# Patient Record
Sex: FEMALE | Race: WHITE | URBAN - METROPOLITAN AREA
[De-identification: names, ages, dates, MRNs, and addresses within clinical notes are randomized per-mention and may not be internally consistent; named-entity substitution may affect disease eponyms.]

---

## 2017-11-14 ENCOUNTER — INPATIENT (INPATIENT)
Facility: HOSPITAL | Age: 72
LOS: 0 days | Discharge: HOME HEALTH | End: 2017-11-15
Attending: INTERNAL MEDICINE | Admitting: FAMILY MEDICINE
Payer: COMMERCIAL

## 2017-11-14 VITALS
OXYGEN SATURATION: 96 % | TEMPERATURE: 98 F | WEIGHT: 190.04 LBS | SYSTOLIC BLOOD PRESSURE: 140 MMHG | HEIGHT: 65 IN | DIASTOLIC BLOOD PRESSURE: 55 MMHG | HEART RATE: 82 BPM | RESPIRATION RATE: 18 BRPM

## 2017-11-14 LAB
ALBUMIN SERPL ELPH-MCNC: 3.3 G/DL — SIGNIFICANT CHANGE UP (ref 3.3–5)
ALP SERPL-CCNC: 75 U/L — SIGNIFICANT CHANGE UP (ref 40–120)
ALT FLD-CCNC: 17 U/L — SIGNIFICANT CHANGE UP (ref 12–78)
ANION GAP SERPL CALC-SCNC: 8 MMOL/L — SIGNIFICANT CHANGE UP (ref 5–17)
APTT BLD: 32.6 SEC — SIGNIFICANT CHANGE UP (ref 27.5–37.4)
AST SERPL-CCNC: 17 U/L — SIGNIFICANT CHANGE UP (ref 15–37)
BASOPHILS # BLD AUTO: 0.1 K/UL — SIGNIFICANT CHANGE UP (ref 0–0.2)
BASOPHILS NFR BLD AUTO: 1.1 % — SIGNIFICANT CHANGE UP (ref 0–2)
BILIRUB SERPL-MCNC: 0.3 MG/DL — SIGNIFICANT CHANGE UP (ref 0.2–1.2)
BUN SERPL-MCNC: 26 MG/DL — HIGH (ref 7–23)
CALCIUM SERPL-MCNC: 9.1 MG/DL — SIGNIFICANT CHANGE UP (ref 8.5–10.1)
CHLORIDE SERPL-SCNC: 102 MMOL/L — SIGNIFICANT CHANGE UP (ref 96–108)
CO2 SERPL-SCNC: 26 MMOL/L — SIGNIFICANT CHANGE UP (ref 22–31)
CREAT SERPL-MCNC: 1.1 MG/DL — SIGNIFICANT CHANGE UP (ref 0.5–1.3)
EOSINOPHIL # BLD AUTO: 0.4 K/UL — SIGNIFICANT CHANGE UP (ref 0–0.5)
EOSINOPHIL NFR BLD AUTO: 4.6 % — SIGNIFICANT CHANGE UP (ref 0–6)
GLUCOSE SERPL-MCNC: 262 MG/DL — HIGH (ref 70–99)
HCT VFR BLD CALC: 45.9 % — HIGH (ref 34.5–45)
HGB BLD-MCNC: 15 G/DL — SIGNIFICANT CHANGE UP (ref 11.5–15.5)
INR BLD: 0.97 RATIO — SIGNIFICANT CHANGE UP (ref 0.88–1.16)
LYMPHOCYTES # BLD AUTO: 2.6 K/UL — SIGNIFICANT CHANGE UP (ref 1–3.3)
LYMPHOCYTES # BLD AUTO: 28.5 % — SIGNIFICANT CHANGE UP (ref 13–44)
MAGNESIUM SERPL-MCNC: 2 MG/DL — SIGNIFICANT CHANGE UP (ref 1.6–2.6)
MCHC RBC-ENTMCNC: 30.4 PG — SIGNIFICANT CHANGE UP (ref 27–34)
MCHC RBC-ENTMCNC: 32.8 GM/DL — SIGNIFICANT CHANGE UP (ref 32–36)
MCV RBC AUTO: 92.7 FL — SIGNIFICANT CHANGE UP (ref 80–100)
MONOCYTES # BLD AUTO: 0.7 K/UL — SIGNIFICANT CHANGE UP (ref 0–0.9)
MONOCYTES NFR BLD AUTO: 7.2 % — SIGNIFICANT CHANGE UP (ref 2–14)
NEUTROPHILS # BLD AUTO: 5.4 K/UL — SIGNIFICANT CHANGE UP (ref 1.8–7.4)
NEUTROPHILS NFR BLD AUTO: 58.6 % — SIGNIFICANT CHANGE UP (ref 43–77)
NT-PROBNP SERPL-SCNC: 395 PG/ML — HIGH (ref 0–125)
PLATELET # BLD AUTO: 270 K/UL — SIGNIFICANT CHANGE UP (ref 150–400)
POTASSIUM SERPL-MCNC: 4.1 MMOL/L — SIGNIFICANT CHANGE UP (ref 3.5–5.3)
POTASSIUM SERPL-SCNC: 4.1 MMOL/L — SIGNIFICANT CHANGE UP (ref 3.5–5.3)
PROT SERPL-MCNC: 7.5 GM/DL — SIGNIFICANT CHANGE UP (ref 6–8.3)
PROTHROM AB SERPL-ACNC: 10.5 SEC — SIGNIFICANT CHANGE UP (ref 9.8–12.7)
RBC # BLD: 4.95 M/UL — SIGNIFICANT CHANGE UP (ref 3.8–5.2)
RBC # FLD: 12.4 % — SIGNIFICANT CHANGE UP (ref 10.3–14.5)
SODIUM SERPL-SCNC: 136 MMOL/L — SIGNIFICANT CHANGE UP (ref 135–145)
TROPONIN I SERPL-MCNC: <0.015 NG/ML — SIGNIFICANT CHANGE UP (ref 0.01–0.04)
WBC # BLD: 9.1 K/UL — SIGNIFICANT CHANGE UP (ref 3.8–10.5)
WBC # FLD AUTO: 9.1 K/UL — SIGNIFICANT CHANGE UP (ref 3.8–10.5)

## 2017-11-14 PROCEDURE — 71010: CPT | Mod: 26

## 2017-11-14 PROCEDURE — 99285 EMERGENCY DEPT VISIT HI MDM: CPT

## 2017-11-14 PROCEDURE — 93010 ELECTROCARDIOGRAM REPORT: CPT

## 2017-11-14 PROCEDURE — 70450 CT HEAD/BRAIN W/O DYE: CPT | Mod: 26

## 2017-11-14 RX ORDER — ASPIRIN/CALCIUM CARB/MAGNESIUM 324 MG
1 TABLET ORAL
Qty: 0 | Refills: 0 | COMMUNITY

## 2017-11-14 RX ORDER — ERGOCALCIFEROL 1.25 MG/1
0 CAPSULE ORAL
Qty: 0 | Refills: 0 | COMMUNITY

## 2017-11-14 NOTE — ED PROVIDER NOTE - MEDICAL DECISION MAKING DETAILS
patient with hx of aortic stenosis and worsening dizziness on exertion. case discussed with cardiologist Dr Parada, he will evaluate patient in the morning.

## 2017-11-14 NOTE — ED PROVIDER NOTE - OBJECTIVE STATEMENT
72 year old female pt, with hx of DM, presents to the ED s/p fall. Pt states she was in kitchen about to clean up, when she fell light headed and dizzy. Started to fall back, hitting head into cabinets and falling onto kitchen floor. Denies LOC. Pt was unable to pick herself up off the ground so daughter called paramedics. Currently, no cp or sob, and was not diaphoretic on floor. Has been feeling increasingly dizzy over the last several weeks. PMD is Dr. briones no scal hematoma f5rom at the hips 72 year old female pt, with hx of DM, presents to the ED s/p fall. Pt states she was in kitchen about to clean up, when she fell light headed and dizzy. Started to fall back, hitting head into cabinets and falling onto kitchen floor. Denies LOC. Pt was unable to pick herself up off the ground so daughter called paramedics. Currently, no cp or sob. Was not diaphoretic on floor. Pt states she has been feeling increasingly dizzy over the last several weeks. PMD is Dr. Silva no scal hematoma f5rom at the hips 72 year old female pt, with hx of DM, presents to the ED s/p fall. Pt states she was in kitchen about to clean up, when she fell light headed and dizzy. Started to fall back, hitting head into cabinets and falling onto kitchen floor. Denies LOC. Pt was unable to pick herself up off the ground so daughter called paramedics. Currently, no cp or sob. Was not diaphoretic on floor. Pt states she has been feeling increasingly dizzy over the last several weeks. PMD is Dr. Silva.

## 2017-11-14 NOTE — ED PROVIDER NOTE - MUSCULOSKELETAL, MLM
Spine appears normal, range of motion is not limited, no muscle or joint tenderness. FROM at the hips.

## 2017-11-14 NOTE — ED ADULT NURSE NOTE - OBJECTIVE STATEMENT
Trauma alert. Patient reports feeling dizzy and fell back hitting head with cabinets. Patient on ASA. Denies LOC, no active bleeding. Daughter at bedside reports patient could not get up so she called the ambulance. Patient reports she has been feeling "dizzy for a while", but has not seen PCP about it. Hx of DM and breast CA.

## 2017-11-14 NOTE — ED ADULT NURSE REASSESSMENT NOTE - NS ED NURSE REASSESS COMMENT FT1
Per  at bedside patient had a cardiac stent placed in about 1 year ago by Dr. Parada. Patient on , not 81 mg as stated earlier. Patient to be admitted due to dizziness. Will continue monitoring patient.

## 2017-11-15 ENCOUNTER — TRANSCRIPTION ENCOUNTER (OUTPATIENT)
Age: 72
End: 2017-11-15

## 2017-11-15 VITALS
SYSTOLIC BLOOD PRESSURE: 122 MMHG | DIASTOLIC BLOOD PRESSURE: 52 MMHG | OXYGEN SATURATION: 100 % | TEMPERATURE: 98 F | RESPIRATION RATE: 17 BRPM | HEART RATE: 71 BPM

## 2017-11-15 DIAGNOSIS — T82.898A OTHER SPECIFIED COMPLICATION OF VASCULAR PROSTHETIC DEVICES, IMPLANTS AND GRAFTS, INITIAL ENCOUNTER: Chronic | ICD-10-CM

## 2017-11-15 LAB
ALBUMIN SERPL ELPH-MCNC: 3.1 G/DL — LOW (ref 3.3–5)
ALP SERPL-CCNC: 66 U/L — SIGNIFICANT CHANGE UP (ref 40–120)
ALT FLD-CCNC: 14 U/L — SIGNIFICANT CHANGE UP (ref 12–78)
ANION GAP SERPL CALC-SCNC: 6 MMOL/L — SIGNIFICANT CHANGE UP (ref 5–17)
AST SERPL-CCNC: 10 U/L — LOW (ref 15–37)
BASOPHILS # BLD AUTO: 0.1 K/UL — SIGNIFICANT CHANGE UP (ref 0–0.2)
BASOPHILS NFR BLD AUTO: 1.2 % — SIGNIFICANT CHANGE UP (ref 0–2)
BILIRUB SERPL-MCNC: 0.4 MG/DL — SIGNIFICANT CHANGE UP (ref 0.2–1.2)
BUN SERPL-MCNC: 23 MG/DL — SIGNIFICANT CHANGE UP (ref 7–23)
CALCIUM SERPL-MCNC: 9.1 MG/DL — SIGNIFICANT CHANGE UP (ref 8.5–10.1)
CHLORIDE SERPL-SCNC: 105 MMOL/L — SIGNIFICANT CHANGE UP (ref 96–108)
CHOLEST SERPL-MCNC: 192 MG/DL — SIGNIFICANT CHANGE UP (ref 10–199)
CK SERPL-CCNC: 52 U/L — SIGNIFICANT CHANGE UP (ref 26–192)
CO2 SERPL-SCNC: 28 MMOL/L — SIGNIFICANT CHANGE UP (ref 22–31)
CREAT SERPL-MCNC: 0.92 MG/DL — SIGNIFICANT CHANGE UP (ref 0.5–1.3)
EOSINOPHIL # BLD AUTO: 0.4 K/UL — SIGNIFICANT CHANGE UP (ref 0–0.5)
EOSINOPHIL NFR BLD AUTO: 4.4 % — SIGNIFICANT CHANGE UP (ref 0–6)
GLUCOSE BLDC GLUCOMTR-MCNC: 189 MG/DL — HIGH (ref 70–99)
GLUCOSE BLDC GLUCOMTR-MCNC: 239 MG/DL — HIGH (ref 70–99)
GLUCOSE SERPL-MCNC: 298 MG/DL — HIGH (ref 70–99)
HBA1C BLD-MCNC: 9.7 % — HIGH (ref 4–5.6)
HCT VFR BLD CALC: 44.1 % — SIGNIFICANT CHANGE UP (ref 34.5–45)
HDLC SERPL-MCNC: 46 MG/DL — SIGNIFICANT CHANGE UP (ref 40–125)
HGB BLD-MCNC: 14 G/DL — SIGNIFICANT CHANGE UP (ref 11.5–15.5)
LIPID PNL WITH DIRECT LDL SERPL: 117 MG/DL — SIGNIFICANT CHANGE UP
LYMPHOCYTES # BLD AUTO: 3.2 K/UL — SIGNIFICANT CHANGE UP (ref 1–3.3)
LYMPHOCYTES # BLD AUTO: 34.5 % — SIGNIFICANT CHANGE UP (ref 13–44)
MAGNESIUM SERPL-MCNC: 2 MG/DL — SIGNIFICANT CHANGE UP (ref 1.6–2.6)
MCHC RBC-ENTMCNC: 29.9 PG — SIGNIFICANT CHANGE UP (ref 27–34)
MCHC RBC-ENTMCNC: 31.8 GM/DL — LOW (ref 32–36)
MCV RBC AUTO: 94 FL — SIGNIFICANT CHANGE UP (ref 80–100)
MONOCYTES # BLD AUTO: 0.7 K/UL — SIGNIFICANT CHANGE UP (ref 0–0.9)
MONOCYTES NFR BLD AUTO: 7.4 % — SIGNIFICANT CHANGE UP (ref 2–14)
NEUTROPHILS # BLD AUTO: 4.8 K/UL — SIGNIFICANT CHANGE UP (ref 1.8–7.4)
NEUTROPHILS NFR BLD AUTO: 52.5 % — SIGNIFICANT CHANGE UP (ref 43–77)
PHOSPHATE SERPL-MCNC: 2.5 MG/DL — SIGNIFICANT CHANGE UP (ref 2.5–4.5)
PLATELET # BLD AUTO: 237 K/UL — SIGNIFICANT CHANGE UP (ref 150–400)
POTASSIUM SERPL-MCNC: 4.5 MMOL/L — SIGNIFICANT CHANGE UP (ref 3.5–5.3)
POTASSIUM SERPL-SCNC: 4.5 MMOL/L — SIGNIFICANT CHANGE UP (ref 3.5–5.3)
PROT SERPL-MCNC: 6.8 GM/DL — SIGNIFICANT CHANGE UP (ref 6–8.3)
RBC # BLD: 4.7 M/UL — SIGNIFICANT CHANGE UP (ref 3.8–5.2)
RBC # FLD: 12.3 % — SIGNIFICANT CHANGE UP (ref 10.3–14.5)
SODIUM SERPL-SCNC: 139 MMOL/L — SIGNIFICANT CHANGE UP (ref 135–145)
TOTAL CHOLESTEROL/HDL RATIO MEASUREMENT: 4.2 RATIO — SIGNIFICANT CHANGE UP (ref 3.3–7.1)
TRIGL SERPL-MCNC: 146 MG/DL — SIGNIFICANT CHANGE UP (ref 10–149)
TROPONIN I SERPL-MCNC: <0.015 NG/ML — SIGNIFICANT CHANGE UP (ref 0.01–0.04)
TROPONIN I SERPL-MCNC: <0.015 NG/ML — SIGNIFICANT CHANGE UP (ref 0.01–0.04)
WBC # BLD: 9.2 K/UL — SIGNIFICANT CHANGE UP (ref 3.8–10.5)
WBC # FLD AUTO: 9.2 K/UL — SIGNIFICANT CHANGE UP (ref 3.8–10.5)

## 2017-11-15 PROCEDURE — 93010 ELECTROCARDIOGRAM REPORT: CPT

## 2017-11-15 RX ORDER — INSULIN LISPRO 100/ML
VIAL (ML) SUBCUTANEOUS AT BEDTIME
Qty: 0 | Refills: 0 | Status: DISCONTINUED | OUTPATIENT
Start: 2017-11-15 | End: 2017-11-15

## 2017-11-15 RX ORDER — SODIUM CHLORIDE 9 MG/ML
1000 INJECTION, SOLUTION INTRAVENOUS
Qty: 0 | Refills: 0 | Status: DISCONTINUED | OUTPATIENT
Start: 2017-11-15 | End: 2017-11-15

## 2017-11-15 RX ORDER — INFLUENZA VIRUS VACCINE 15; 15; 15; 15 UG/.5ML; UG/.5ML; UG/.5ML; UG/.5ML
0.5 SUSPENSION INTRAMUSCULAR ONCE
Qty: 0 | Refills: 0 | Status: COMPLETED | OUTPATIENT
Start: 2017-11-15 | End: 2017-11-15

## 2017-11-15 RX ORDER — GLUCAGON INJECTION, SOLUTION 0.5 MG/.1ML
1 INJECTION, SOLUTION SUBCUTANEOUS ONCE
Qty: 0 | Refills: 0 | Status: DISCONTINUED | OUTPATIENT
Start: 2017-11-15 | End: 2017-11-15

## 2017-11-15 RX ORDER — INSULIN LISPRO 100/ML
VIAL (ML) SUBCUTANEOUS
Qty: 0 | Refills: 0 | Status: DISCONTINUED | OUTPATIENT
Start: 2017-11-15 | End: 2017-11-15

## 2017-11-15 RX ORDER — DEXTROSE 50 % IN WATER 50 %
25 SYRINGE (ML) INTRAVENOUS ONCE
Qty: 0 | Refills: 0 | Status: DISCONTINUED | OUTPATIENT
Start: 2017-11-15 | End: 2017-11-15

## 2017-11-15 RX ORDER — DEXTROSE 50 % IN WATER 50 %
1 SYRINGE (ML) INTRAVENOUS ONCE
Qty: 0 | Refills: 0 | Status: DISCONTINUED | OUTPATIENT
Start: 2017-11-15 | End: 2017-11-15

## 2017-11-15 RX ORDER — ASPIRIN/CALCIUM CARB/MAGNESIUM 324 MG
325 TABLET ORAL DAILY
Qty: 0 | Refills: 0 | Status: DISCONTINUED | OUTPATIENT
Start: 2017-11-15 | End: 2017-11-15

## 2017-11-15 RX ORDER — DEXTROSE 50 % IN WATER 50 %
12.5 SYRINGE (ML) INTRAVENOUS ONCE
Qty: 0 | Refills: 0 | Status: DISCONTINUED | OUTPATIENT
Start: 2017-11-15 | End: 2017-11-15

## 2017-11-15 RX ORDER — ENOXAPARIN SODIUM 100 MG/ML
40 INJECTION SUBCUTANEOUS EVERY 24 HOURS
Qty: 0 | Refills: 0 | Status: DISCONTINUED | OUTPATIENT
Start: 2017-11-15 | End: 2017-11-15

## 2017-11-15 RX ADMIN — Medication 325 MILLIGRAM(S): at 12:38

## 2017-11-15 RX ADMIN — Medication 2: at 12:41

## 2017-11-15 RX ADMIN — Medication 4: at 07:59

## 2017-11-15 NOTE — DISCHARGE NOTE ADULT - CARE PLAN
Principal Discharge DX:	Dizziness  Goal:	Continue current medication regimen  Instructions for follow-up, activity and diet:	- Follow up with Dr. Parada in 1 week for further work up of AS and referral for TAVR.  - Follow up with your PCP in 1-2 weeks Principal Discharge DX:	Dizziness  Goal:	Continue current medication regimen  Instructions for follow-up, activity and diet:	- Follow up with Dr. Parada in office tomorrow for Echo - need to assess aortic stenosis and arrange referral for TAVR.  Ambulate w/ walker and assist.    - Follow up with your PCP in 1 week.

## 2017-11-15 NOTE — CONSULT NOTE ADULT - ASSESSMENT
Dizziness  Severe AS.  CAD  DM  Dehydration  Gait imbalance  Dementia    Suggest:    Will need out pt further work up of AS and ref for TAVR. Coronary status seems to be stable.  If no orthostasis may discharge home for out pt treatment  Advised  to maintain compliance to follow up  Keep hydrated.  PT assessment for gait stability Dizziness  Severe AS.  CAD  DM  Dehydration  Gait imbalance  Dementia    Suggest:    Will need out pt further work up of AS and ref for TAVR. Coronary status seems to be stable.  If no orthostasis may discharge home for out pt treatment  Advised  to maintain compliance to follow up  Keep hydrated.  PT assessment for gait stability  D/W  in detail

## 2017-11-15 NOTE — DISCHARGE NOTE ADULT - PATIENT PORTAL LINK FT
“You can access the [employer] Patient Portal, offered by [facility], by registering with the following website: [email or web address]”

## 2017-11-15 NOTE — CONSULT NOTE ADULT - SUBJECTIVE AND OBJECTIVE BOX
Chief complaint of dizziness.     HPI:  72 year old female pt, with hx of moderate to severe AS, DM, CAD s/p stent LCx 10/16,  dementia, remote CVA with residual left sided numbness presents to the ED with dizziness and s/p fall.  No LOC. Has been dehydrated. Pt states she was in kitchen about to clean up, when she fell light headed and dizzy. Started to fall back, hitting head into cabinets and falling onto kitchen floor. Pt was unable to pick herself up off the ground so daughter called paramedics. Currently, no cp or sob. Pt states she has been feeling increasingly dizzy over the last several weeks. She is better after fluids.  at bedside.    PAST MEDICAL & SURGICAL HISTORY:  Breast cancer  DM (diabetes mellitus)  CAD - LCx stent 10/16  AS  Dementia    PREVIOUS CARDIAC WORKUP:      Echo:  4/16 Mod - severe AS  Cardiac Cath: 10/16 PCI of LCx    ALLERGIES:    No Known Allergies       MEDICATIONS  (STANDING):  aspirin 325 milliGRAM(s) Oral daily  dextrose 5%. 1000 milliLiter(s) (50 mL/Hr) IV Continuous <Continuous>  dextrose 50% Injectable 12.5 Gram(s) IV Push once  dextrose 50% Injectable 25 Gram(s) IV Push once  dextrose 50% Injectable 25 Gram(s) IV Push once  enoxaparin Injectable 40 milliGRAM(s) SubCutaneous every 24 hours  influenza   Vaccine 0.5 milliLiter(s) IntraMuscular once  insulin lispro (HumaLOG) corrective regimen sliding scale   SubCutaneous three times a day before meals  insulin lispro (HumaLOG) corrective regimen sliding scale   SubCutaneous at bedtime    MEDICATIONS  (PRN):  dextrose Gel 1 Dose(s) Oral once PRN Blood Glucose LESS THAN 70 milliGRAM(s)/deciliter  glucagon  Injectable 1 milliGRAM(s) IntraMuscular once PRN Glucose LESS THAN 70 milligrams/deciliter      FAMILY HISTORY:  NC    SOCIAL HISTORY:  Ex smoker. No ETOH abuse. No illicit drugs.     ROS:     Detailed ten system ROS was performed and was negative except for history as eluded to above.    no fever  no chills  no nausea  no vomiting  no diarrhea  no constipation  no melena  no hematochezia  no chest pain  no palpitations  no sob  no dyspnea  no cough  no wheezing  no anorexia  no headache  + dizziness  no syncope  no weakness  no myalgia  no dysuria  no polyuria  no hematuria     Vital Signs Last 24 Hrs  T(C): 37 (15 Nov 2017 10:10), Max: 37.1 (15 Nov 2017 06:22)  T(F): 98.6 (15 Nov 2017 10:10), Max: 98.8 (15 Nov 2017 06:22)  HR: 70 (15 Nov 2017 10:10) (70 - 82)  BP: 124/67 (15 Nov 2017 10:10) (120/61 - 140/55)  BP(mean): --  RR: 18 (15 Nov 2017 10:10) (15 - 18)  SpO2: 100% (15 Nov 2017 10:10) (95% - 100%)      PHYSICAL EXAM:    General Appearance:    Comfortable, AAO X 3, in no distress. Forgetfuil.  HEENT:                       Atraumatic, PERRLA, EOMI, conjunctiva clear.   Neck:                          Supple, no adenopathy, no thyromegaly, no JVD, no carotid bruit  Back:                          Symmetric, no CV angle fullness or tenderness, no soft tissue tenderness.  Chest:                         Clear to auscultation, no wheezes, rales or rhonchi, symmetric air entry, no tachypnea, retractions or cyanosis  Heart:                          S1, S2 normal, no gallop, 4/6 ESM.  Abdomen:                    Soft, non-tender, bowel sounds active. No palpable masses.   Extremities:                 no cyanosis, no edema, no joint swelling. Peripheral pulses normal  Skin:                           Skin color, texture normal. No rashes   Neurologic:                  Grossly cranial nerves intact, sensory and motor normal.      TELEMETRY:   Normal sinus rhythm with no tachy or layla events     ECG:  NSR, LBBB (chronic)    LABS:                          14.0   9.2   )-----------( 237      ( 15 Nov 2017 05:31 )             44.1     11-15    139  |  105  |  23  ----------------------------<  298<H>  4.5   |  28  |  0.92    Ca    9.1      15 Nov 2017 05:31  Phos  2.5     11-15  Mg     2.0     11-15    TPro  6.8  /  Alb  3.1<L>  /  TBili  0.4  /  DBili  x   /  AST  10<L>  /  ALT  14  /  AlkPhos  66  11-15      11-15 @ 05:31  Trop-I  <0.015  CK      52  CK-MB   --    11-15 @ 01:17  Trop-I  <0.015  CK      --  CK-MB   --    11-14 @ 20:53  Trop-I  <0.015  CK      --  CK-MB   --    Pro BNP  395 11-14 @ 20:53    PT/INR - ( 14 Nov 2017 20:53 )   PT: 10.5 sec;   INR: 0.97 ratio    PTT - ( 14 Nov 2017 20:53 )  PTT:32.6 sec      RADIOLOGY & ADDITIONAL STUDIES:    CXR: Normal    CT Head: No acute ICH. No old CVA, no mass, no fracture.

## 2017-11-15 NOTE — H&P ADULT - ASSESSMENT
A/P  1.Intermittent dizziness/lightheadedness / Fall/ hx of aortic stenosis  -R/O  symptomatic Aortic stenosis,vs Arrhythmia vsr/o ACS   - Admit to tele  - Echo, ERYN  -continue asa, lipid profile in am  - cardiology consult    2.CAD s/p stent 2016/hx of chonic LBBB  -continue asa 325, not on BB at home     3. DM Type 2  - ISS  - hold invokana   4.chronic forgetfulness/hx of CVA with chronic left sided paresthesia  5DVT prophylaxis A/P  1.Intermittent dizziness/lightheadedness / Fall/ hx of aortic stenosis  -R/O  symptomatic Aortic stenosis,vs Arrhythmia vsr/o ACS   - Admit to tele  - Echo, ERYN  -continue asa, lipid profile in am  - cardiology consult    2.CAD s/p stent 2016/hx of chonic LBBB  -continue asa 325, not on BB at home     3. DM Type 2  - ISS  - hold invokana   - patient reports she is on one more med for DM but not sure what it is .Need to call her endocrinologist in am for name of med.Dr Stovall [phone]    4.chronic forgetfulness/hx of CVA with chronic left sided paresthesia  - stable    5.DVT prophylaxis   Lovenox SC 72 year old female pt, with hx of DM,CAD s/p stent,  hx of aortic stenosis(as per ED ),  remote CVA with residual left sided numbness, chronic forgetfuness presents to the ED s/p fall, head Ct- no acute pathology    A/P  1.Intermittent dizziness/lightheadedness / Fall/ hx of aortic stenosis  -R/O  symptomatic Aortic stenosis,vs Arrhythmia vsr/o ACS   - Admit to tele  - Echo, ERYN  -continue asa, lipid profile in am  - cardiology consult    2.CAD s/p stent 2016/hx of chonic LBBB  -continue asa 325, not on BB at home     3. DM Type 2  - ISS  - hold invokana   - patient reports she is on one more med for DM but not sure what it is .Need to call her endocrinologist in am for name of med.Dr Stovall [phone]    4.chronic forgetfulness/hx of CVA with chronic left sided paresthesia  - stable    5.DVT prophylaxis   Lovenox SC 72 year old female pt, with hx of DM,CAD s/p stent,  hx of aortic stenosis(as per ED ),  remote CVA with residual left sided numbness, chronic forgetfuness presents to the ED s/p fall, head Ct- no acute pathology    A/P  1.Intermittent dizziness/lightheadedness / Fall/ hx of aortic stenosis  -R/O  symptomatic Aortic stenosis,vs Arrhythmia vsr/o ACS   - Admit to tele  - Echo, ERYN  -continue asa, lipid profile in am  - cardiology consult    2.CAD s/p stent 2016/hx of chonic LBBB  -continue asa 325, not on BB at home     3. DM Type 2  - ISS  - hold invokana   - patient reports she is on one more med for DM but not sure what it is .Need to call her endocrinologist in am for name of med.Dr Stovall [phone]    4.chronic forgetfulness/hx of CVA with chronic left sided paresthesia  - stable    5.DVT prophylaxis   Lovenox SC    6. elevated BUN  - monitor BUN/Cr , monitor h/H  - will avoid IVf for now

## 2017-11-15 NOTE — H&P ADULT - NSHPLABSRESULTS_GEN_ALL_CORE
15.0   9.1   )-----------( 270      ( 14 Nov 2017 20:53 )             45.9       CBC Full  -  ( 14 Nov 2017 20:53 )  WBC Count : 9.1 K/uL  Hemoglobin : 15.0 g/dL  Hematocrit : 45.9 %  Platelet Count - Automated : 270 K/uL  Mean Cell Volume : 92.7 fl  Mean Cell Hemoglobin : 30.4 pg  Mean Cell Hemoglobin Concentration : 32.8 gm/dL  Auto Neutrophil # : 5.4 K/uL  Auto Lymphocyte # : 2.6 K/uL  Auto Monocyte # : 0.7 K/uL  Auto Eosinophil # : 0.4 K/uL  Auto Basophil # : 0.1 K/uL  Auto Neutrophil % : 58.6 %  Auto Lymphocyte % : 28.5 %  Auto Monocyte % : 7.2 %  Auto Eosinophil % : 4.6 %  Auto Basophil % : 1.1 %      11-14    136  |  102  |  26<H>  ----------------------------<  262<H>  4.1   |  26  |  1.10    Ca    9.1      14 Nov 2017 20:53  Mg     2.0     11-14    TPro  7.5  /  Alb  3.3  /  TBili  0.3  /  DBili  x   /  AST  17  /  ALT  17  /  AlkPhos  75  11-14      LIVER FUNCTIONS - ( 14 Nov 2017 20:53 )  Alb: 3.3 g/dL / Pro: 7.5 gm/dL / ALK PHOS: 75 U/L / ALT: 17 U/L / AST: 17 U/L / GGT: x             PT/INR - ( 14 Nov 2017 20:53 )   PT: 10.5 sec;   INR: 0.97 ratio         PTT - ( 14 Nov 2017 20:53 )  PTT:32.6 sec    CARDIAC MARKERS ( 15 Nov 2017 01:17 )  <0.015 ng/mL / x     / x     / x     / x      CARDIAC MARKERS ( 14 Nov 2017 20:53 )  <0.015 ng/mL / x     / x     / x     / x                    MEDICATIONS  (STANDING):  aspirin 325 milliGRAM(s) Oral daily  influenza   Vaccine 0.5 milliLiter(s) IntraMuscular once

## 2017-11-15 NOTE — DISCHARGE NOTE ADULT - MEDICATION SUMMARY - MEDICATIONS TO TAKE
I will START or STAY ON the medications listed below when I get home from the hospital:    aspirin 325 mg oral tablet  -- 1 tab(s) by mouth once a day  -- Indication: For for your heart    Invokana  -- Indication: For for DM    Vitamin D2 50,000 intl units (1.25 mg) oral capsule  -- 1 cap(s) by mouth once a week  -- Indication: For vitamin D

## 2017-11-15 NOTE — DISCHARGE NOTE ADULT - PLAN OF CARE
Continue current medication regimen - Follow up with Dr. Parada in 1 week for further work up of AS and referral for TAVR.  - Follow up with your PCP in 1-2 weeks - Follow up with Dr. Parada in office tomorrow for Echo - need to assess aortic stenosis and arrange referral for TAVR.  Ambulate w/ walker and assist.    - Follow up with your PCP in 1 week.

## 2017-11-15 NOTE — H&P ADULT - HISTORY OF PRESENT ILLNESS
72 year old female pt, with hx of DM,CAD s/p stent,  hx of aortic stenosis(as per ED ),  remote CVA with residual left sided numbness, chronic forgetfuness presents to the ED s/p fall. Pt states she was in kitchen about to clean up, when she fell light headed and dizzy. Started to fall back, hitting head into cabinets and falling onto kitchen floor. Denies LOC. Pt was unable to pick herself up off the ground so daughter called paramedics. Currently, no cp or sob. Was not diaphoretic on floor. Pt states she has been feeling increasingly dizzy over the last several weeks  currently denies any complaints,no SOB,no CP,no nausea or vomiting or abdominal pain, no fever,no chills    In ED EKG NSR 80bpm, LBBB nonspecific ST/t waves unchanged from EKG done oct 20th 2016  CXR- no pleural effusion,no infiltrate ,nopulmonary vascular congestion  troponin x2 negative proBNP 262    pmhx/pshx- CAD s/p stent 2016 on [id number] ,not on Plavix, DM type 2, aortic stenosis, breast CA 1990 r/o right breast lumpectomy s/p chemo and radiation 72 year old female pt, with hx of DM,CAD s/p stent,  hx of aortic stenosis(as per ED ),  remote CVA with residual left sided numbness, chronic forgetfuness presents to the ED s/p fall. Pt states she was in kitchen about to clean up, when she fell light headed and dizzy. Started to fall back, hitting head into cabinets and falling onto kitchen floor. Denies LOC. Pt was unable to pick herself up off the ground so daughter called paramedics. Currently, no cp or sob. Was not diaphoretic on floor. Pt states she has been feeling increasingly dizzy over the last several weeks  currently denies any complaints,no SOB,no CP,no nausea or vomiting or abdominal pain, no fever,no chills    In ED EKG NSR 80bpm, LBBB nonspecific ST/t waves unchanged from EKG done oct 20th 2016  CXR- no pleural effusion,no infiltrate ,nopulmonary vascular congestion  troponin x2 negative proBNP 262        pmhx/pshx- CAD s/p stent 2016 on [id number] ,not on Plavix, DM type 2, aortic stenosis, breast CA 1990 r/o right breast lumpectomy s/p chemo and radiation,hx of CVA /residual left paresthesia, chronic fortgetfulness  family hx noncontributory  social hx- remote smoking hx - quit many years ago,social wine use/ 1 glass of wine on rare occasions, no recreational drug use    history mainly derived from  and ED note ,patient is forgetful at baseline

## 2017-11-15 NOTE — DISCHARGE NOTE ADULT - CARE PROVIDER_API CALL
Sylvester Parada), Cardiology; Interventional Cardiology  [address] [facility]  [place] [address]  Phone: [phone]  Fax: [phone]    Matt Silva), Family Medicine  [address] [place] [address]  Phone: [phone]  Fax: [phone]

## 2017-11-15 NOTE — PHYSICAL THERAPY INITIAL EVALUATION ADULT - MODALITIES TREATMENT COMMENTS
The pt was left sitting OOB and in the chair with chair alarm secured. The pt was in NAD at end of tx, chair alarm secured, CB, tray and phone in place.

## 2017-11-15 NOTE — H&P ADULT - NSHPPHYSICALEXAM_GEN_ALL_CORE
PHYSICAL EXAM:    Daily Height in cm: 165.1 (14 Nov 2017 20:47)    Daily     ICU Vital Signs Last 24 Hrs  T(C): 36.7 (15 Nov 2017 03:00), Max: 36.8 (15 Nov 2017 00:18)  T(F): 98 (15 Nov 2017 03:00), Max: 98.2 (15 Nov 2017 00:18)  HR: 75 (15 Nov 2017 03:00) (72 - 82)  BP: 121/49 (15 Nov 2017 03:00) (121/49 - 140/55)  BP(mean): --  ABP: --  ABP(mean): --  RR: 17 (15 Nov 2017 03:00) (17 - 18)  SpO2: 98% (15 Nov 2017 03:00) (95% - 98%)      Constitutional: NAD,pleasant  HEENT: Atraumatic, SANDRA, Normal, No congestion  Respiratory: Breath Sounds normal, no rhonchi/wheeze  Cardiovascular: N S1S2; EDOUARD present  Gastrointestinal: Abdomen soft, non tender, Bowel Ssounds present  Extremities: No edema, peripheral pulses present  Neurological: alert awake ,chonic forgetfulness at baseline, moves all extremities equally 4/5 motor ,  Skin: Non cellulitic,     Back: No CVA tenderness   Musculoskeletal: non tender  Breasts: Deferred  Genitourinary: deferred  Rectal: Deferred

## 2017-11-15 NOTE — PHYSICAL THERAPY INITIAL EVALUATION ADULT - ADDITIONAL COMMENTS
The pt reports that she has both a RW and a SAC at home but prefers to use the SAC. The pt reports that she is frequently dizzy with prolonged activity.

## 2017-11-15 NOTE — PHYSICAL THERAPY INITIAL EVALUATION ADULT - GENERAL OBSERVATIONS, REHAB EVAL
The pt was pleasant and cooperative, sitting OOB and in the chair with chair alarm secured. The pt's  was present in the room as well.

## 2017-11-15 NOTE — PHYSICAL THERAPY INITIAL EVALUATION ADULT - PERTINENT HX OF CURRENT PROBLEM, REHAB EVAL
72 year old female pt, with hx of DM,CAD s/p stent,  hx of aortic stenosis(as per ED ),  remote CVA with residual left sided numbness, chronic forgetfuness presents to the ED s/p fall. Pt states she was in kitchen about to clean up, when she fell light headed and dizzy. Started to fall back, hitting head into cabinets and falling onto kitchen floor.

## 2017-11-15 NOTE — PHYSICAL THERAPY INITIAL EVALUATION ADULT - DIAGNOSIS, PT EVAL
72 y.o. female with Intermittent dizziness/lightheadedness  s/p Fall at home. The pt has a hx of chronic forgetfulness and a hx of CVA with chronic left sided paresthesias.

## 2017-11-17 DIAGNOSIS — I35.0 NONRHEUMATIC AORTIC (VALVE) STENOSIS: ICD-10-CM

## 2017-11-17 DIAGNOSIS — I25.10 ATHEROSCLEROTIC HEART DISEASE OF NATIVE CORONARY ARTERY WITHOUT ANGINA PECTORIS: ICD-10-CM

## 2017-11-17 DIAGNOSIS — R42 DIZZINESS AND GIDDINESS: ICD-10-CM

## 2017-11-17 DIAGNOSIS — Z95.5 PRESENCE OF CORONARY ANGIOPLASTY IMPLANT AND GRAFT: ICD-10-CM

## 2017-11-17 DIAGNOSIS — Z79.899 OTHER LONG TERM (CURRENT) DRUG THERAPY: ICD-10-CM

## 2017-11-17 DIAGNOSIS — E11.9 TYPE 2 DIABETES MELLITUS WITHOUT COMPLICATIONS: ICD-10-CM

## 2017-11-17 DIAGNOSIS — I69.954 HEMIPLEGIA AND HEMIPARESIS FOLLOWING UNSPECIFIED CEREBROVASCULAR DISEASE AFFECTING LEFT NON-DOMINANT SIDE: ICD-10-CM

## 2017-11-17 DIAGNOSIS — E86.0 DEHYDRATION: ICD-10-CM

## 2017-11-17 DIAGNOSIS — I45.2 BIFASCICULAR BLOCK: ICD-10-CM

## 2017-11-17 DIAGNOSIS — R26.89 OTHER ABNORMALITIES OF GAIT AND MOBILITY: ICD-10-CM

## 2017-11-17 DIAGNOSIS — Z79.82 LONG TERM (CURRENT) USE OF ASPIRIN: ICD-10-CM

## 2017-11-17 DIAGNOSIS — F03.90 UNSPECIFIED DEMENTIA WITHOUT BEHAVIORAL DISTURBANCE: ICD-10-CM

## 2017-11-17 DIAGNOSIS — Z79.84 LONG TERM (CURRENT) USE OF ORAL HYPOGLYCEMIC DRUGS: ICD-10-CM

## 2019-01-25 NOTE — ED ADULT TRIAGE NOTE - NSWEIGHTCALCTOOLDRUG_GEN_A_CORE
History and Physical    Subjective:     Dexter Anderson is a 34 y.o. with symptomatic macromastia who presents to OR for bilateral breast reduction     Past Medical History:   Diagnosis Date    History of D&C     Morbid obesity ([address]       Past Surgical History:   Procedure Laterality Date    HX OTHER SURGICAL      drainage of facial absess age 25     History reviewed. No pertinent family history. Social History     Tobacco Use    Smoking status: Never Smoker    Smokeless tobacco: Never Used   Substance Use Topics    Alcohol use: No     Comment: rare       Prior to Admission medications    Medication Sig Start Date End Date Taking? Authorizing Provider   ascorbic acid, vitamin C, (VITAMIN C) 250 mg chew Take 1 Tab by mouth as needed. Yes Provider, Historical   ibuprofen (MOTRIN IB) 200 mg tablet Take 400 mg by mouth every six (6) hours as needed for Pain. Provider, Historical     No Known Allergies     Review of Systems:  A comprehensive review of systems was negative except for that written in the History of Present Illness. Objective:       Physical Exam:   Heart: RRR  Lungs: CTAB  General: A&O      Assessment:     Active Problems:    * No active hospital problems.  *      Plan:     OR today for breast reduction     Signed By: Kaden Woods MD     January 25, 2019  used

## 2020-03-15 ENCOUNTER — EMERGENCY (EMERGENCY)
Facility: HOSPITAL | Age: 75
LOS: 0 days | Discharge: HOME | End: 2020-03-16
Attending: EMERGENCY MEDICINE
Payer: MEDICARE

## 2020-03-15 VITALS
RESPIRATION RATE: 18 BRPM | WEIGHT: 160.06 LBS | DIASTOLIC BLOOD PRESSURE: 101 MMHG | HEIGHT: 67 IN | SYSTOLIC BLOOD PRESSURE: 171 MMHG | HEART RATE: 82 BPM | TEMPERATURE: 98 F | OXYGEN SATURATION: 96 %

## 2020-03-15 DIAGNOSIS — T82.898A OTHER SPECIFIED COMPLICATION OF VASCULAR PROSTHETIC DEVICES, IMPLANTS AND GRAFTS, INITIAL ENCOUNTER: Chronic | ICD-10-CM

## 2020-03-15 DIAGNOSIS — S09.90XA UNSPECIFIED INJURY OF HEAD, INITIAL ENCOUNTER: ICD-10-CM

## 2020-03-15 DIAGNOSIS — I65.29 OCCLUSION AND STENOSIS OF UNSPECIFIED CAROTID ARTERY: ICD-10-CM

## 2020-03-15 DIAGNOSIS — Z85.3 PERSONAL HISTORY OF MALIGNANT NEOPLASM OF BREAST: ICD-10-CM

## 2020-03-15 DIAGNOSIS — Z79.82 LONG TERM (CURRENT) USE OF ASPIRIN: ICD-10-CM
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
## 2020-03-15 DIAGNOSIS — E11.9 TYPE 2 DIABETES MELLITUS WITHOUT COMPLICATIONS: ICD-10-CM
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
## 2020-03-15 DIAGNOSIS — S20.229A CONTUSION OF UNSPECIFIED BACK WALL OF THORAX, INITIAL ENCOUNTER: ICD-10-CM

## 2020-03-15 PROCEDURE — 71250 CT THORAX DX C-: CPT | Mod: 26

## 2020-03-15 PROCEDURE — 72125 CT NECK SPINE W/O DYE: CPT

## 2020-03-15 PROCEDURE — 70450 CT HEAD/BRAIN W/O DYE: CPT | Mod: 26

## 2020-03-15 PROCEDURE — 72100 X-RAY EXAM L-S SPINE 2/3 VWS: CPT

## 2020-03-15 PROCEDURE — 99284 EMERGENCY DEPT VISIT MOD MDM: CPT

## 2020-03-15 PROCEDURE — 71250 CT THORAX DX C-: CPT

## 2020-03-15 PROCEDURE — 70450 CT HEAD/BRAIN W/O DYE: CPT

## 2020-03-15 PROCEDURE — 99284 EMERGENCY DEPT VISIT MOD MDM: CPT | Mod: 25

## 2020-03-15 PROCEDURE — 99053 MED SERV 10PM-8AM 24 HR FAC: CPT

## 2020-03-15 PROCEDURE — 72125 CT NECK SPINE W/O DYE: CPT | Mod: 26

## 2020-03-15 PROCEDURE — 72100 X-RAY EXAM L-S SPINE 2/3 VWS: CPT | Mod: 26

## 2020-03-15 RX ORDER — ACETAMINOPHEN 500 MG
1000 TABLET ORAL ONCE
Refills: 0 | Status: COMPLETED | OUTPATIENT
Start: 2020-03-15 | End: 2020-03-15

## 2020-03-15 RX ORDER — SODIUM CHLORIDE 9 MG/ML
1000 INJECTION INTRAMUSCULAR; INTRAVENOUS; SUBCUTANEOUS ONCE
Refills: 0 | Status: COMPLETED | OUTPATIENT
Start: 2020-03-15 | End: 2020-03-15

## 2020-03-15 RX ADMIN — Medication 1000 MILLIGRAM(S): at 22:08

## 2020-03-15 NOTE — ED PROVIDER NOTE - NSFOLLOWUPINSTRUCTIONS_ED_ALL_ED_FT
FALL PREVENTION FOR OLDER ADULTS - Discharge Care     Fall Prevention for Older Adults    WHAT YOU NEED TO KNOW:    As you age, your muscles weaken and your risk for falls increases. Your risk also increases if you take medicines that make you sleepy or dizzy. You may also be at risk if you have vision or joint problems, have low blood pressure, or are not active.    DISCHARGE INSTRUCTIONS:    Call 911 or have someone else call if:     You have fallen and are unconscious.      You have fallen and cannot move part of your body.    Contact your healthcare provider if:     You have fallen and have pain or a headache.      You have questions or concerns about your condition or care.    Fall prevention tips:     Stay active. Exercise can help strengthen your muscles and improve your balance. Your healthcare provider may recommend water aerobics, walking, or Ricardo Chi. He may also recommend physical therapy to improve your coordination. Never start an exercise program without asking your healthcare provider first.      Wear shoes that fit well and have soles that . Wear shoes both inside and outside. Use slippers with good . Avoid shoes with high heels.      Use assistive devices as directed. Your healthcare provider may suggest that you use a cane or walker to help you keep your balance. You may need to have grab bars put in your bathroom near the toilet or in the shower.      Stand or sit up slowly. This may help you keep your balance and prevent falls.      Wear a personal alarm. This is a device that allows you to call 911 if you need help. Ask for more information on personal alarms.      Manage your medical conditions. Keep all appointments with your healthcare providers. Visit your eye doctor as directed.    Home safety tips:     Add items to prevent falls in the bathroom. Put nonslip strips on your bath or shower floor to prevent you from slipping. Use a bath mat if you do not have carpet in the bathroom. This will prevent you from falling when you step out of the bath or shower. Use a shower seat so you do not need to stand while you shower. Sit on the toilet or a chair in your bathroom to dry yourself and put on clothing. This will prevent you from losing your balance from drying or dressing yourself while you are standing.       Keep paths clear. Remove books, shoes, and other objects from walkways and stairs. Place cords for telephones and lamps out of the way so that you do not need to walk over them. Tape them down if you cannot move them. Remove small rugs. If you cannot remove a rug, secure it with double-sided tape. This will prevent you from tripping.       Install bright lights in your home. Use night lights to help light paths to the bathroom or kitchen. Always turn on the light before you start walking.      Keep items you use often on shelves within reach. Do not use a step stool to help you reach an item.      Paint or place reflective tape on the edges of your stairs. This will help you see the stairs better.    Follow up with your healthcare provider as directed: Write down your questions so you remember to ask them during your visits.

## 2020-03-15 NOTE — ED ADULT NURSE NOTE - OBJECTIVE STATEMENT
Pt presents to ER s/p unwitnessed mechanical trip and fall from standing c/o back pain. Pt reports hitting back of head and back. Denies LOC/bloodthinners. Skin intact. AO x 3, normal breathing pattern with no difficulty, equal b/l chest expansion.

## 2020-03-15 NOTE — ED PROVIDER NOTE - ATTENDING CONTRIBUTION TO CARE
I, Bernadette Thomason MD,  performed the initial face to face bedside interview with this patient regarding history of present illness, review of symptoms and relevant past medical, social and family history.  I completed an independent physical examination.  I was the initial provider who evaluated this patient. I have signed out the follow up of any pending tests (i.e. labs, radiological studies) to the ACP.  I have communicated the patient’s plan of care and disposition with the ACP.  The history, relevant review of systems, past medical and surgical history, medical decision making, and physical examination was documented by the scribe in my presence and I attest to the accuracy of the documentation.

## 2020-03-15 NOTE — ED ADULT NURSE NOTE - CHIEF COMPLAINT QUOTE
BIBA s/p unwitnessed fall at home. Pt A&Ox2. Forgetful, no ASA/ blood thinners as per . Denies pain/discomfort at present. Pt denies hitting head. Dr Thomason aware, awaiting decision if Neuro alert

## 2020-03-15 NOTE — ED PROVIDER NOTE - OBJECTIVE STATEMENT
PA note: Patient is a 74 year old female with PMHx of DM, carotid stenosis, breast cancer who presents to [facility] with c/o head injury and mid-back injury from a fall just PTA. Pt was getting out of chair to use bathroom when she slipped and fell backwards, hitting back of her head and mid back. No hip injury. No LOC. Patient c/o HA, denies rib injury, difficulty breathing, SOB, dizziness, lightheadedness before or after the fall. Patient remained on the floor until EMS arrived to help her get up. ~AGUSTIN Schneider

## 2020-03-15 NOTE — ED PROVIDER NOTE - PHYSICAL EXAMINATION
PA NOTE: GEN: AOX3, NAD. HEENT: Throat clear. Airway is patent. EYES: PERRLA. EOMI. Head: +Mild tenderness occipital scalp area. No deformity. +Old lipoma noted occipital lobe, no acute changes. NECK: Supple, No JVD. FROM. C-spine non-tender. CV:S1S2, RRR, LUNGS: CTA b/l, no w/r/r. CHEST: Equal chest expansion and rise. No deformity. ABD: Soft, NT/ND, no rebound, no guarding. No CVAT. EXT: No e/c/c. 2+ distal pulses. BACK: +Mild tenderness mid-thoracic back area. No spinal deformity or stepoff. Painful flexion/extension back. SKIN: No rashes. NEURO: No focal deficits. CN II-XII intact. FROM. 5/5 motor and sensory. AGUSTIN Schneider

## 2020-03-15 NOTE — ED PROVIDER NOTE - PROGRESS NOTE DETAILS
Pal EGAN for ED attending, Dr. Thomason : 74 YOF w/ PMHx of Breast CA, DM and carotid stent occlusion presents to the ED BIBA c/o unwitnessed fall at home. Hit to head, pain to back. Pt A&Ox2, forgetful. Not on ASA or any blood thinners as per . Denies cough, runny nose, fever, or any pain/discomfort at present. PA note: Labs ordered by Dr. Thomason. Will wait on labs/urine before dc. ~AGUSTIN Schneider Patient ABSOLUTELY REFUSES any labs, urine test, wants to go home, feels better, able to ambulate slowly but steady on her own. ~AGUSTIN Schneider PA note: Patient re-examined and re-evaluated. Patient feels much better at this time. ED evaluation, Diagnosis and management discussed with the patient and family in detail. Workup results discussed with ED attending, OK to dc home. Close PMD follow up encouraged.  Strict ED return instructions discussed in detail and patient given the opportunity to ask any questions about their discharge diagnosis and instructions. Patient verbalized understanding. ~ AGUSTIN Schneider PA note: 74 year old female presents with fall injury to back of head and mid-back. No LOC. will get CT, xrays. Tylenol for pain. Reassess. ~AGUSTIN Schneider PA note: Patient ABSOLUTELY REFUSES any labs, urine test, wants to go home, feels better, able to ambulate slowly but steady on her own. Patient re-examined and re-evaluated. Patient feels much better at this time. ED evaluation, Diagnosis and management discussed with the patient and family in detail. Workup results discussed with ED attending, OK to dc home. Close PMD follow up encouraged.  Strict ED return instructions discussed in detail and patient given the opportunity to ask any questions about their discharge diagnosis and instructions. Patient verbalized understanding. ~ AGUSTIN Schneider

## 2020-03-15 NOTE — ED ADULT TRIAGE NOTE - CHIEF COMPLAINT QUOTE
BIBA s/p unwitnessed fall at home. Pt A&Ox2. Forgetful, no ASA/ blood thinners as per . Denies pain/discomfort at present BIBA s/p unwitnessed fall at home. Pt A&Ox2. Forgetful, no ASA/ blood thinners as per . Denies pain/discomfort at present. Pt denies hitting head. Dr Thomason aware, awaiting decision if Neuro alert

## 2020-03-15 NOTE — ED PROVIDER NOTE - PATIENT PORTAL LINK FT
You can access the FollowMyHealth Patient Portal offered by [facility] by registering at the following website: [email or web address]. By joining [employer]’s FollowMyHealth portal, you will also be able to view your health information using other applications (apps) compatible with our system.

## 2020-03-16 VITALS
DIASTOLIC BLOOD PRESSURE: 90 MMHG | TEMPERATURE: 98 F | HEART RATE: 90 BPM | SYSTOLIC BLOOD PRESSURE: 157 MMHG | RESPIRATION RATE: 17 BRPM | OXYGEN SATURATION: 96 %

## 2020-03-16 PROBLEM — C50.919 MALIGNANT NEOPLASM OF UNSPECIFIED SITE OF UNSPECIFIED FEMALE BREAST: Chronic | Status: ACTIVE | Noted: 2017-11-14

## 2020-03-16 NOTE — ED ADULT NURSE REASSESSMENT NOTE - NS ED NURSE REASSESS COMMENT FT1
Pt refused blood work and IV start. Pt sitting up comfortable in bed, AO x 3, oriented to baseline, normal breathing pattern with no difficulty. Pt reports back pain with movement.

## 2020-03-16 NOTE — ED ADULT NURSE REASSESSMENT NOTE - NS ED NURSE REASSESS COMMENT FT1
Pt refused blood work and IV start. Pt states "I want to go home I don't want it". Pt updated on plan of care

## 2020-05-20 ENCOUNTER — EMERGENCY (EMERGENCY)
Facility: HOSPITAL | Age: 75
LOS: 0 days | Discharge: HOME | End: 2020-05-20
Attending: EMERGENCY MEDICINE
Payer: MEDICARE

## 2020-05-20 VITALS
DIASTOLIC BLOOD PRESSURE: 72 MMHG | OXYGEN SATURATION: 95 % | HEART RATE: 86 BPM | TEMPERATURE: 98 F | RESPIRATION RATE: 18 BRPM | SYSTOLIC BLOOD PRESSURE: 132 MMHG

## 2020-05-20 VITALS
HEART RATE: 75 BPM | DIASTOLIC BLOOD PRESSURE: 68 MMHG | OXYGEN SATURATION: 97 % | TEMPERATURE: 98 F | RESPIRATION RATE: 17 BRPM | SYSTOLIC BLOOD PRESSURE: 123 MMHG

## 2020-05-20 DIAGNOSIS — Z85.3 PERSONAL HISTORY OF MALIGNANT NEOPLASM OF BREAST: ICD-10-CM

## 2020-05-20 DIAGNOSIS — K59.00 CONSTIPATION, UNSPECIFIED: ICD-10-CM

## 2020-05-20 DIAGNOSIS — N39.0 URINARY TRACT INFECTION, SITE NOT SPECIFIED: ICD-10-CM

## 2020-05-20 DIAGNOSIS — E11.9 TYPE 2 DIABETES MELLITUS WITHOUT COMPLICATIONS: ICD-10-CM

## 2020-05-20 DIAGNOSIS — F03.90 UNSPECIFIED DEMENTIA WITHOUT BEHAVIORAL DISTURBANCE: ICD-10-CM

## 2020-05-20 DIAGNOSIS — T82.898A OTHER SPECIFIED COMPLICATION OF VASCULAR PROSTHETIC DEVICES, IMPLANTS AND GRAFTS, INITIAL ENCOUNTER: Chronic | ICD-10-CM

## 2020-05-20 DIAGNOSIS — Z11.59 ENCOUNTER FOR SCREENING FOR OTHER VIRAL DISEASES: ICD-10-CM

## 2020-05-20 LAB
ALBUMIN SERPL ELPH-MCNC: 3.4 G/DL — SIGNIFICANT CHANGE UP (ref 3.3–5)
ALP SERPL-CCNC: 77 U/L — SIGNIFICANT CHANGE UP (ref 40–120)
ALT FLD-CCNC: 11 U/L — LOW (ref 12–78)
ANION GAP SERPL CALC-SCNC: 9 MMOL/L — SIGNIFICANT CHANGE UP (ref 5–17)
APPEARANCE UR: ABNORMAL
AST SERPL-CCNC: 7 U/L — LOW (ref 15–37)
BASOPHILS # BLD AUTO: 0.05 K/UL — SIGNIFICANT CHANGE UP (ref 0–0.2)
BASOPHILS NFR BLD AUTO: 0.6 % — SIGNIFICANT CHANGE UP (ref 0–2)
BILIRUB SERPL-MCNC: 0.9 MG/DL — SIGNIFICANT CHANGE UP (ref 0.2–1.2)
BILIRUB UR-MCNC: NEGATIVE — SIGNIFICANT CHANGE UP
BUN SERPL-MCNC: 15 MG/DL — SIGNIFICANT CHANGE UP (ref 7–23)
CALCIUM SERPL-MCNC: 9.4 MG/DL — SIGNIFICANT CHANGE UP (ref 8.5–10.1)
CHLORIDE SERPL-SCNC: 102 MMOL/L — SIGNIFICANT CHANGE UP (ref 96–108)
CO2 SERPL-SCNC: 25 MMOL/L — SIGNIFICANT CHANGE UP (ref 22–31)
COLOR SPEC: YELLOW — SIGNIFICANT CHANGE UP
CREAT SERPL-MCNC: 0.82 MG/DL — SIGNIFICANT CHANGE UP (ref 0.5–1.3)
DIFF PNL FLD: ABNORMAL
EOSINOPHIL # BLD AUTO: 0.05 K/UL — SIGNIFICANT CHANGE UP (ref 0–0.5)
EOSINOPHIL NFR BLD AUTO: 0.6 % — SIGNIFICANT CHANGE UP (ref 0–6)
GLUCOSE SERPL-MCNC: 373 MG/DL — HIGH (ref 70–99)
GLUCOSE UR QL: 1000 MG/DL
HCT VFR BLD CALC: 45.3 % — HIGH (ref 34.5–45)
HGB BLD-MCNC: 15.1 G/DL — SIGNIFICANT CHANGE UP (ref 11.5–15.5)
IMM GRANULOCYTES NFR BLD AUTO: 0.3 % — SIGNIFICANT CHANGE UP (ref 0–1.5)
KETONES UR-MCNC: ABNORMAL
LACTATE SERPL-SCNC: 1.3 MMOL/L — SIGNIFICANT CHANGE UP (ref 0.7–2)
LEUKOCYTE ESTERASE UR-ACNC: ABNORMAL
LIDOCAIN IGE QN: 35 U/L — LOW (ref 73–393)
LYMPHOCYTES # BLD AUTO: 0.77 K/UL — LOW (ref 1–3.3)
LYMPHOCYTES # BLD AUTO: 8.7 % — LOW (ref 13–44)
MCHC RBC-ENTMCNC: 29.6 PG — SIGNIFICANT CHANGE UP (ref 27–34)
MCHC RBC-ENTMCNC: 33.3 GM/DL — SIGNIFICANT CHANGE UP (ref 32–36)
MCV RBC AUTO: 88.8 FL — SIGNIFICANT CHANGE UP (ref 80–100)
MONOCYTES # BLD AUTO: 0.35 K/UL — SIGNIFICANT CHANGE UP (ref 0–0.9)
MONOCYTES NFR BLD AUTO: 4 % — SIGNIFICANT CHANGE UP (ref 2–14)
NEUTROPHILS # BLD AUTO: 7.56 K/UL — HIGH (ref 1.8–7.4)
NEUTROPHILS NFR BLD AUTO: 85.8 % — HIGH (ref 43–77)
NITRITE UR-MCNC: NEGATIVE — SIGNIFICANT CHANGE UP
PH UR: 6 — SIGNIFICANT CHANGE UP (ref 5–8)
PLATELET # BLD AUTO: 266 K/UL — SIGNIFICANT CHANGE UP (ref 150–400)
POTASSIUM SERPL-MCNC: 4.2 MMOL/L — SIGNIFICANT CHANGE UP (ref 3.5–5.3)
POTASSIUM SERPL-SCNC: 4.2 MMOL/L — SIGNIFICANT CHANGE UP (ref 3.5–5.3)
PROT SERPL-MCNC: 7.5 GM/DL — SIGNIFICANT CHANGE UP (ref 6–8.3)
PROT UR-MCNC: 30 MG/DL
RBC # BLD: 5.1 M/UL — SIGNIFICANT CHANGE UP (ref 3.8–5.2)
RBC # FLD: 12.3 % — SIGNIFICANT CHANGE UP (ref 10.3–14.5)
SARS-COV-2 RNA SPEC QL NAA+PROBE: SIGNIFICANT CHANGE UP
SODIUM SERPL-SCNC: 136 MMOL/L — SIGNIFICANT CHANGE UP (ref 135–145)
SP GR SPEC: 1.01 — SIGNIFICANT CHANGE UP (ref 1.01–1.02)
UROBILINOGEN FLD QL: NEGATIVE MG/DL — SIGNIFICANT CHANGE UP
WBC # BLD: 8.81 K/UL — SIGNIFICANT CHANGE UP (ref 3.8–10.5)
WBC # FLD AUTO: 8.81 K/UL — SIGNIFICANT CHANGE UP (ref 3.8–10.5)

## 2020-05-20 PROCEDURE — 87086 URINE CULTURE/COLONY COUNT: CPT

## 2020-05-20 PROCEDURE — 87635 SARS-COV-2 COVID-19 AMP PRB: CPT

## 2020-05-20 PROCEDURE — 74019 RADEX ABDOMEN 2 VIEWS: CPT | Mod: 26

## 2020-05-20 PROCEDURE — 99284 EMERGENCY DEPT VISIT MOD MDM: CPT | Mod: 25

## 2020-05-20 PROCEDURE — 71045 X-RAY EXAM CHEST 1 VIEW: CPT | Mod: 26

## 2020-05-20 PROCEDURE — 80053 COMPREHEN METABOLIC PANEL: CPT

## 2020-05-20 PROCEDURE — 81001 URINALYSIS AUTO W/SCOPE: CPT

## 2020-05-20 PROCEDURE — 36415 COLL VENOUS BLD VENIPUNCTURE: CPT

## 2020-05-20 PROCEDURE — 71045 X-RAY EXAM CHEST 1 VIEW: CPT

## 2020-05-20 PROCEDURE — 99284 EMERGENCY DEPT VISIT MOD MDM: CPT

## 2020-05-20 PROCEDURE — 74019 RADEX ABDOMEN 2 VIEWS: CPT

## 2020-05-20 PROCEDURE — 83605 ASSAY OF LACTIC ACID: CPT

## 2020-05-20 PROCEDURE — 83690 ASSAY OF LIPASE: CPT

## 2020-05-20 PROCEDURE — 85025 COMPLETE CBC W/AUTO DIFF WBC: CPT

## 2020-05-20 PROCEDURE — 96374 THER/PROPH/DIAG INJ IV PUSH: CPT

## 2020-05-20 RX ORDER — SODIUM CHLORIDE 9 MG/ML
1000 INJECTION INTRAMUSCULAR; INTRAVENOUS; SUBCUTANEOUS ONCE
Refills: 0 | Status: COMPLETED | OUTPATIENT
Start: 2020-05-20 | End: 2020-05-20

## 2020-05-20 RX ORDER — CEFTRIAXONE 500 MG/1
1000 INJECTION, POWDER, FOR SOLUTION INTRAMUSCULAR; INTRAVENOUS ONCE
Refills: 0 | Status: DISCONTINUED | OUTPATIENT
Start: 2020-05-20 | End: 2020-05-20

## 2020-05-20 RX ORDER — CEFTRIAXONE 500 MG/1
1000 INJECTION, POWDER, FOR SOLUTION INTRAMUSCULAR; INTRAVENOUS ONCE
Refills: 0 | Status: COMPLETED | OUTPATIENT
Start: 2020-05-20 | End: 2020-05-20

## 2020-05-20 RX ADMIN — SODIUM CHLORIDE 1000 MILLILITER(S): 9 INJECTION INTRAMUSCULAR; INTRAVENOUS; SUBCUTANEOUS at 19:30

## 2020-05-20 RX ADMIN — CEFTRIAXONE 1000 MILLIGRAM(S): 500 INJECTION, POWDER, FOR SOLUTION INTRAMUSCULAR; INTRAVENOUS at 20:31

## 2020-05-20 NOTE — ED PROVIDER NOTE - PATIENT PORTAL LINK FT
You can access the FollowMyHealth Patient Portal offered by [facility] by registering at the following website: [email or web address]. By joining [employer]’s FollowMyHealth portal, you will also be able to view your health information using other applications (apps) compatible with our system.

## 2020-05-20 NOTE — ED ADULT NURSE NOTE - OBJECTIVE STATEMENT
Patient presents to ED complaining of urinary symptoms. As per family patient has had increased urinary frequency and has been "digging in her rectum". Patient unable to have BM. Patient A&0x2, complains of "having to go to bathroom". Patient denies any pain, NVD. Patient unable to answer all assessment questions due to mental status.

## 2020-05-20 NOTE — ED PROVIDER NOTE - NS_ ATTENDINGSCRIBEDETAILS _ED_A_ED_FT
I Sky Segura MD saw and examined the patient. Scribe documented for me and under my supervision. I have modified the scribe's documentation where necessary to reflect my history, physical exam and other relevant documentations pertinent to the care of the patient.

## 2020-05-20 NOTE — ED PROVIDER NOTE - CARE PLAN
Principal Discharge DX:	Constipation, unspecified constipation type  Goal:	s/p disimpaction in the ED with removal of large volume of stool  Secondary Diagnosis:	Urinary tract infection without hematuria, site unspecified

## 2020-05-20 NOTE — ED PROVIDER NOTE - NSFOLLOWUPINSTRUCTIONS_ED_ALL_ED_FT
Please follow up with your primary care physician. Please return to us immediately if any worsening of symptoms including if unable to defecate. I spoke with your  Mr. Qureshi who wishes for you to go home. You have a urinary tract infection. You are given antibiotics in the ER and antibiotics will sent home for you. You were tested for COVID but you have not the results yet as they will take a day to come. Please self isolate if possible and follow up with results. For all other health concerns please follow up with your primary care physician or contact [facility] if you do not have one.

## 2020-05-20 NOTE — ED ADULT NURSE NOTE - CHIEF COMPLAINT QUOTE
Pt brought in by daughter for possible uti , urinary urgency , frequency , unsteady gait and has been found digging in her rectum , spouse Rhys may ( [phone]

## 2020-05-20 NOTE — ED PROVIDER NOTE - NEUROLOGICAL, MLM
Alert and oriented to self but not place or time Alert and oriented to self but not place or time. CNs 2-12 grossly intact. FROM x 4.

## 2020-05-20 NOTE — ED PROVIDER NOTE - NSFOLLOWUPCLINICS_GEN_ALL_ED_FT
Count includes the [facility]  Family Medicine  [address] [place] [address]  Phone: [phone]  Fax:   Follow Up Time:

## 2020-05-20 NOTE — ED PROVIDER NOTE - OBJECTIVE STATEMENT
74 y/o female with a PMHx of Breast CA, DM, presents to the ED c/o sent in by family c/o touching her rectum and being uncomfortable today. Denies chest pain, SOB, palpitations, fever, chills. Pt notes difficultly defecating. No recent exotic travel. No other complains at this time. NKDA. : Rhys Qureshi, [phone] 76 y/o female with a PMHx of Breast CA, DM, carotid atherosclerosis, dementia/memory issues, presents to the ED c/o sent in by family c/o touching her rectum and being uncomfortable today as per family with hx of stool impaction/constipation. Denies chest pain, SOB, palpitations, fever, chills. Pt notes difficultly defecating. No recent exotic travel. No other complains at this time. NKDA. : Rhys Qureshi, [phone]. No skin rash. no focal or visual complaints. No neck pain or stiffness. No recent trauma. No blood in urine.

## 2020-05-20 NOTE — ED PROVIDER NOTE - GASTROINTESTINAL, MLM
Abdomen soft, non-tender, no guarding. Rectal exam chaperoned by MAXIME Alonzo: unable to pass thermometer due to stool, therefore performed rectal disimpaction and removed large amount of dry stool equal to 5 fists-worth of dry material. [address], exp: 8/31/2020: Guaiac negative. Abdomen soft, non-tender, no guarding. Rectal exam chaperoned by MAXIME Alonzo: unable to pass thermometer due to stool mass, therefore performed rectal disimpaction and removed large amount of dry stool equal to 5 fists-worth of dry material. [address], exp: 8/31/2020: Guaiac negative. Patient tolerated procedure well (spoke with family about the disimpaction - family/ happy and agreeable).

## 2020-05-20 NOTE — ED PROVIDER NOTE - CLINICAL SUMMARY MEDICAL DECISION MAKING FREE TEXT BOX
Plan: Labs, XR abdomen, after disimpaction likely pt complaint due to impaction. If XR and labs unremarkable, COVID testing and outpatient follow-up.

## 2020-05-20 NOTE — ED PROVIDER NOTE - CONSTITUTIONAL, MLM
normal... Alert and oriented to self but not place or time Alert and oriented to self but not place or time (baseline as per family). Nontoxic appearing. NAD.

## 2020-05-20 NOTE — ED PROVIDER NOTE - PROGRESS NOTE DETAILS
Imelda PACKER:  consented to disimpaction. Removed large volume of stool. This likely was at least contributing to patient feeling uncomfortable as when passing rectal thermometer, RN and I could not advance thermometer due to large dried stool mass. Patient also noted with UTI. Spoke with  and offered admission for UTI given patient's age and co-morbidites, but family/ wish for patient to go home. Instructed them to bring patient back if any fever, delirium etc. or if  changes his mind. Explained that without admission and in house observation can not guarantee against worsening of the symptoms and potentially sepsis;  understands and verabalizes understanding of instructions but wishes for patient to return and is happy that patient is disimpacted. No acute events while in the ED.

## 2020-05-20 NOTE — ED ADULT NURSE NOTE - NSIMPLEMENTINTERV_GEN_ALL_ED
Implemented All Fall Risk Interventions:  [place] to call system. Call bell, personal items and telephone within reach. Instruct patient to call for assistance. Room bathroom lighting operational. Non-slip footwear when patient is off stretcher. Physically safe environment: no spills, clutter or unnecessary equipment. Stretcher in lowest position, wheels locked, appropriate side rails in place. Provide visual cue, wrist band, yellow gown, etc. Monitor gait and stability. Monitor for mental status changes and reorient to person, place, and time. Review medications for side effects contributing to fall risk. Reinforce activity limits and safety measures with patient and family.

## 2020-05-20 NOTE — ED ADULT TRIAGE NOTE - CHIEF COMPLAINT QUOTE
Pt brought in by daughter for possible uti , urinary urgency , dizziness, and was found digging in her rectum , spouse kaye may [phone] Pt brought in by daughter for possible uti , urinary urgency , frequency , unsteady gait and has been found digging in her rectum , spouse Rhys may ( [phone]

## 2020-05-21 LAB
CULTURE RESULTS: SIGNIFICANT CHANGE UP
SPECIMEN SOURCE: SIGNIFICANT CHANGE UP

## 2020-05-23 RX ORDER — CEPHALEXIN 500 MG
1 CAPSULE ORAL
Qty: 14 | Refills: 0
Start: 2020-05-23 | End: 2020-05-29

## 2020-07-29 ENCOUNTER — INPATIENT (INPATIENT)
Facility: HOSPITAL | Age: 75
LOS: 2 days | Discharge: HOME HEALTH | DRG: 307 | End: 2020-08-01
Attending: HOSPITALIST | Admitting: INTERNAL MEDICINE
Payer: MEDICARE

## 2020-07-29 VITALS
DIASTOLIC BLOOD PRESSURE: 84 MMHG | HEART RATE: 93 BPM | OXYGEN SATURATION: 96 % | WEIGHT: 160.06 LBS | RESPIRATION RATE: 17 BRPM | TEMPERATURE: 98 F | SYSTOLIC BLOOD PRESSURE: 151 MMHG

## 2020-07-29 DIAGNOSIS — T82.898A OTHER SPECIFIED COMPLICATION OF VASCULAR PROSTHETIC DEVICES, IMPLANTS AND GRAFTS, INITIAL ENCOUNTER: Chronic | ICD-10-CM

## 2020-07-29 DIAGNOSIS — R55 SYNCOPE AND COLLAPSE: ICD-10-CM

## 2020-07-29 LAB
ALBUMIN SERPL ELPH-MCNC: 3 G/DL — LOW (ref 3.3–5)
ALP SERPL-CCNC: 79 U/L — SIGNIFICANT CHANGE UP (ref 40–120)
ALT FLD-CCNC: 25 U/L — SIGNIFICANT CHANGE UP (ref 12–78)
ANION GAP SERPL CALC-SCNC: 5 MMOL/L — SIGNIFICANT CHANGE UP (ref 5–17)
APPEARANCE UR: CLEAR — SIGNIFICANT CHANGE UP
AST SERPL-CCNC: 22 U/L — SIGNIFICANT CHANGE UP (ref 15–37)
BASOPHILS # BLD AUTO: 0.04 K/UL — SIGNIFICANT CHANGE UP (ref 0–0.2)
BASOPHILS NFR BLD AUTO: 0.5 % — SIGNIFICANT CHANGE UP (ref 0–2)
BILIRUB SERPL-MCNC: 0.7 MG/DL — SIGNIFICANT CHANGE UP (ref 0.2–1.2)
BILIRUB UR-MCNC: NEGATIVE — SIGNIFICANT CHANGE UP
BUN SERPL-MCNC: 11 MG/DL — SIGNIFICANT CHANGE UP (ref 7–23)
CALCIUM SERPL-MCNC: 9.1 MG/DL — SIGNIFICANT CHANGE UP (ref 8.5–10.1)
CHLORIDE SERPL-SCNC: 107 MMOL/L — SIGNIFICANT CHANGE UP (ref 96–108)
CO2 SERPL-SCNC: 28 MMOL/L — SIGNIFICANT CHANGE UP (ref 22–31)
COLOR SPEC: YELLOW — SIGNIFICANT CHANGE UP
CREAT SERPL-MCNC: 0.7 MG/DL — SIGNIFICANT CHANGE UP (ref 0.5–1.3)
DIFF PNL FLD: NEGATIVE — SIGNIFICANT CHANGE UP
EOSINOPHIL # BLD AUTO: 0.27 K/UL — SIGNIFICANT CHANGE UP (ref 0–0.5)
EOSINOPHIL NFR BLD AUTO: 3.4 % — SIGNIFICANT CHANGE UP (ref 0–6)
GLUCOSE SERPL-MCNC: 225 MG/DL — HIGH (ref 70–99)
GLUCOSE UR QL: 250 MG/DL
HCT VFR BLD CALC: 43.5 % — SIGNIFICANT CHANGE UP (ref 34.5–45)
HGB BLD-MCNC: 14.5 G/DL — SIGNIFICANT CHANGE UP (ref 11.5–15.5)
IMM GRANULOCYTES NFR BLD AUTO: 0.3 % — SIGNIFICANT CHANGE UP (ref 0–1.5)
KETONES UR-MCNC: NEGATIVE — SIGNIFICANT CHANGE UP
LEUKOCYTE ESTERASE UR-ACNC: ABNORMAL
LYMPHOCYTES # BLD AUTO: 2.26 K/UL — SIGNIFICANT CHANGE UP (ref 1–3.3)
LYMPHOCYTES # BLD AUTO: 28.6 % — SIGNIFICANT CHANGE UP (ref 13–44)
MCHC RBC-ENTMCNC: 29.6 PG — SIGNIFICANT CHANGE UP (ref 27–34)
MCHC RBC-ENTMCNC: 33.3 GM/DL — SIGNIFICANT CHANGE UP (ref 32–36)
MCV RBC AUTO: 88.8 FL — SIGNIFICANT CHANGE UP (ref 80–100)
MONOCYTES # BLD AUTO: 0.59 K/UL — SIGNIFICANT CHANGE UP (ref 0–0.9)
MONOCYTES NFR BLD AUTO: 7.5 % — SIGNIFICANT CHANGE UP (ref 2–14)
NEUTROPHILS # BLD AUTO: 4.71 K/UL — SIGNIFICANT CHANGE UP (ref 1.8–7.4)
NEUTROPHILS NFR BLD AUTO: 59.7 % — SIGNIFICANT CHANGE UP (ref 43–77)
NITRITE UR-MCNC: NEGATIVE — SIGNIFICANT CHANGE UP
NT-PROBNP SERPL-SCNC: 1507 PG/ML — HIGH (ref 0–450)
PH UR: 8 — SIGNIFICANT CHANGE UP (ref 5–8)
PLATELET # BLD AUTO: 204 K/UL — SIGNIFICANT CHANGE UP (ref 150–400)
POTASSIUM SERPL-MCNC: 4.1 MMOL/L — SIGNIFICANT CHANGE UP (ref 3.5–5.3)
POTASSIUM SERPL-SCNC: 4.1 MMOL/L — SIGNIFICANT CHANGE UP (ref 3.5–5.3)
PROT SERPL-MCNC: 7.2 GM/DL — SIGNIFICANT CHANGE UP (ref 6–8.3)
PROT UR-MCNC: NEGATIVE MG/DL — SIGNIFICANT CHANGE UP
RBC # BLD: 4.9 M/UL — SIGNIFICANT CHANGE UP (ref 3.8–5.2)
RBC # FLD: 13.7 % — SIGNIFICANT CHANGE UP (ref 10.3–14.5)
SARS-COV-2 RNA SPEC QL NAA+PROBE: SIGNIFICANT CHANGE UP
SODIUM SERPL-SCNC: 140 MMOL/L — SIGNIFICANT CHANGE UP (ref 135–145)
SP GR SPEC: 1.01 — SIGNIFICANT CHANGE UP (ref 1.01–1.02)
TROPONIN I SERPL-MCNC: 0.18 NG/ML — HIGH (ref 0.01–0.04)
TROPONIN I SERPL-MCNC: 0.22 NG/ML — HIGH (ref 0.01–0.04)
UROBILINOGEN FLD QL: NEGATIVE MG/DL — SIGNIFICANT CHANGE UP
WBC # BLD: 7.89 K/UL — SIGNIFICANT CHANGE UP (ref 3.8–10.5)
WBC # FLD AUTO: 7.89 K/UL — SIGNIFICANT CHANGE UP (ref 3.8–10.5)

## 2020-07-29 PROCEDURE — 70450 CT HEAD/BRAIN W/O DYE: CPT | Mod: 26

## 2020-07-29 PROCEDURE — 72125 CT NECK SPINE W/O DYE: CPT | Mod: 26

## 2020-07-29 PROCEDURE — 97116 GAIT TRAINING THERAPY: CPT | Mod: GP

## 2020-07-29 PROCEDURE — 84100 ASSAY OF PHOSPHORUS: CPT

## 2020-07-29 PROCEDURE — 84484 ASSAY OF TROPONIN QUANT: CPT

## 2020-07-29 PROCEDURE — 83036 HEMOGLOBIN GLYCOSYLATED A1C: CPT

## 2020-07-29 PROCEDURE — 99223 1ST HOSP IP/OBS HIGH 75: CPT | Mod: AI

## 2020-07-29 PROCEDURE — 36415 COLL VENOUS BLD VENIPUNCTURE: CPT

## 2020-07-29 PROCEDURE — 80061 LIPID PANEL: CPT

## 2020-07-29 PROCEDURE — 82550 ASSAY OF CK (CPK): CPT

## 2020-07-29 PROCEDURE — 73030 X-RAY EXAM OF SHOULDER: CPT | Mod: 26,RT

## 2020-07-29 PROCEDURE — 71045 X-RAY EXAM CHEST 1 VIEW: CPT | Mod: 26

## 2020-07-29 PROCEDURE — 85027 COMPLETE CBC AUTOMATED: CPT

## 2020-07-29 PROCEDURE — 82306 VITAMIN D 25 HYDROXY: CPT

## 2020-07-29 PROCEDURE — 85610 PROTHROMBIN TIME: CPT

## 2020-07-29 PROCEDURE — 80048 BASIC METABOLIC PNL TOTAL CA: CPT

## 2020-07-29 PROCEDURE — 86769 SARS-COV-2 COVID-19 ANTIBODY: CPT

## 2020-07-29 PROCEDURE — 82962 GLUCOSE BLOOD TEST: CPT

## 2020-07-29 PROCEDURE — 93010 ELECTROCARDIOGRAM REPORT: CPT

## 2020-07-29 PROCEDURE — 86803 HEPATITIS C AB TEST: CPT

## 2020-07-29 PROCEDURE — 82607 VITAMIN B-12: CPT

## 2020-07-29 PROCEDURE — 85730 THROMBOPLASTIN TIME PARTIAL: CPT

## 2020-07-29 PROCEDURE — 73502 X-RAY EXAM HIP UNI 2-3 VIEWS: CPT | Mod: 26,RT

## 2020-07-29 PROCEDURE — 97162 PT EVAL MOD COMPLEX 30 MIN: CPT | Mod: GP

## 2020-07-29 PROCEDURE — 83735 ASSAY OF MAGNESIUM: CPT

## 2020-07-29 NOTE — H&P ADULT - HISTORY OF PRESENT ILLNESS
74 yo F with a PMH dementia, DM2 (not on meds), breast cancer, dementia, and CVA who presents after a fall. The patient states 74 yo F with a PMH dementia, DM2 (not on meds), breast cancer, LBBB, and CVA who presents after a fall. The patient states she turned the wrong way and fell in the kitchen. She does not recall the details of the fall but thinks she may have passed out. She does not remember if she was feeling dizzy before. She denies any symptoms since and feels well. She says she does not normally fall down.    Her  states she has fallen down many times because of difficulty walking. She usually walks with a walker but during this episode, she had not walked with a walker. He states normally she does not pass out from falling, although he acknowledges this current fall was unwitnessed.  She follows with Dr. Parada for her LBBB and saw him last week.    In the ED, she was not given any medication.

## 2020-07-29 NOTE — H&P ADULT - ASSESSMENT
74 yo F with a PMH dementia, DM2 (not on meds), breast cancer, LBBB, and CVA who presents after an unwitnessed fall.    1) Unwitnessed fall  - Most likely mechanical, based on patient's history  - However, in light of troponin elevation, would monitor on telemetry for cardiac signs  - EKG shows LBBB but is unchanged  - Troponins are decreasing  - CT head and C-spine negative  - UA and CXR negative  - No complaints of pain but will give meds for pain control  - PT eval  - Check Vitamin D and B12 levels and c/w Calcium/Vitamin D    2) Elevated troponin  - Suspect reactive in the setting of fall  - Less likely contributing to fall  - LBBB but no EKG changes  - Continue to monitor on telemetry    3) Type 2 Diabetes Mellitus  - Not on meds  - BG elevated at 229  - Will check FS and place on moderate SSI QAC + HS  - Check A1C in AM    4) Dementia  - Stable, not on medications  - Will place on fall precautions    5) Prophylactic measure  - DVT PPX: IMPROVE score of 1, but will place on Lovenox 40 mg SQ QD  - Diet: DASH/consistent carbs  - Dispo: pending PT eval and monitoring on telemetry 74 yo F with a PMH dementia, DM2 (not on meds), breast cancer, LBBB, and CVA who presents after an unwitnessed fall.    1) Unwitnessed fall  - Most likely mechanical, based on patient's history  - However, in light of troponin elevation, would monitor on telemetry for cardiac signs  - EKG shows LBBB but is unchanged  - Troponins are decreasing  - CT head and C-spine negative  - UA and CXR negative  - No complaints of pain but will give meds for pain control  - PT eval  - Check Vitamin D and B12 levels and c/w Calcium/Vitamin D    2) Elevated troponin  - Suspect reactive in the setting of fall  - Less likely contributing to fall  - LBBB but no EKG changes  - Continue to monitor on telemetry    3) Type 2 Diabetes Mellitus  - Not on meds  - BG elevated at 229  - Will check FS and place on moderate SSI QAC + HS  - Check A1C in AM    4) Dementia  - Stable, not on medications  - Will place on fall precautions    5) Fungal dermatitis  - Inguinal, bilateral  - Nystatin powder TID    6) Prophylactic measure  - DVT PPX: IMPROVE score of 1, but will place on Lovenox 40 mg SQ QD  - Diet: DASH/consistent carbs  - Dispo: pending PT eval and monitoring on telemetry Vasovagal near syncope

## 2020-07-29 NOTE — H&P ADULT - NSICDXPASTMEDICALHX_GEN_ALL_CORE_FT
PAST MEDICAL HISTORY:  Breast cancer     Cerebrovascular accident (CVA)     Dementia     DM (diabetes mellitus) PAST MEDICAL HISTORY:  Breast cancer     Cerebrovascular accident (CVA)     Dementia     DM (diabetes mellitus) type 2, not on meds    LBBB (left bundle branch block)

## 2020-07-29 NOTE — H&P ADULT - NSHPSOCIALHISTORY_GEN_ALL_CORE
Former smoker, heavy (unclear amount), stopped about 20-30 years ago per .  Nos significant alcohol or drug use.  Former .  Lives with her .

## 2020-07-29 NOTE — ED PROVIDER NOTE - CLINICAL SUMMARY MEDICAL DECISION MAKING FREE TEXT BOX
s/p fall unclear reason possibly due to pt not using walker. check ct brain, cspine, R shoulde xr, R hip xr, labs.

## 2020-07-29 NOTE — ED ADULT TRIAGE NOTE - CHIEF COMPLAINT QUOTE
pt presents to ED brought in by ambulance from home s/p fall on ASA unwitnessed unknown if pt hit her head, pt with complaints of back pain TA called

## 2020-07-29 NOTE — ED PROVIDER NOTE - CARE PLAN
Principal Discharge DX:	Syncope and collapse  Secondary Diagnosis:	NSTEMI (non-ST elevated myocardial infarction)

## 2020-07-29 NOTE — H&P ADULT - NSHPPHYSICALEXAM_GEN_ALL_CORE
Vital Signs Last 24 Hrs  T(C): 36.8 (29 Jul 2020 22:00), Max: 36.9 (29 Jul 2020 17:23)  T(F): 98.2 (29 Jul 2020 22:00), Max: 98.4 (29 Jul 2020 17:23)  HR: 90 (29 Jul 2020 22:00) (82 - 93)  BP: 143/67 (29 Jul 2020 22:00) (141/75 - 151/84)  BP(mean): --  RR: 18 (29 Jul 2020 22:00) (17 - 18)  SpO2: 97% (29 Jul 2020 22:00) (96% - 97%)    GENERAL: No acute distress  HEENT: PERRL, EOMI, MMM, no oropharyngeal lesions  NECK: supple, no stiffness, no JVD, no thyromegaly  PULM: respirations non-labored, clear to auscultation bilaterally, no rales, rhonchi, or wheezes  CV: regular rate and rhythm, no murmurs, gallops, or rubs  GI: abdomen soft, nontender, nondistended, no masses felt, normal bowel sounds  : + fungal dermatitis along inguinal folds  MSK: strength 5/5 bilateral upper/lower extremities. No joint swelling, erythema, or warmth.  LYMPH: no anterior cervical, posterior cervical, supraclavicular, or inguinal lymphadenopathy  NEURO: A&Ox2 (person and place only, not year or President), no tremors, sensation intact. No dysmetria. Normal heel-to-shin. Left pupil 3 mm and reactive, right pupil 5 mm, irregular, and less reactive  SKIN: no rashes, lesions, or edema Vital Signs Last 24 Hrs  T(C): 36.8 (29 Jul 2020 22:00), Max: 36.9 (29 Jul 2020 17:23)  T(F): 98.2 (29 Jul 2020 22:00), Max: 98.4 (29 Jul 2020 17:23)  HR: 90 (29 Jul 2020 22:00) (82 - 93)  BP: 143/67 (29 Jul 2020 22:00) (141/75 - 151/84)  BP(mean): --  RR: 18 (29 Jul 2020 22:00) (17 - 18)  SpO2: 97% (29 Jul 2020 22:00) (96% - 97%)    GENERAL: No acute distress  HEENT: PERRL, EOMI, MMM, no oropharyngeal lesions  NECK: supple, no stiffness, no JVD, no thyromegaly  PULM: respirations non-labored, clear to auscultation bilaterally, no rales, rhonchi, or wheezes  CV: regular rate and rhythm. III/VI RUSB systolic murmur. No gallops or rubs  GI: abdomen soft, nontender, nondistended, no masses felt, normal bowel sounds  : + fungal dermatitis along inguinal folds  MSK: strength 5/5 bilateral upper/lower extremities. No joint swelling, erythema, or warmth.  LYMPH: no anterior cervical, posterior cervical, supraclavicular, or inguinal lymphadenopathy  NEURO: A&Ox2 (person and place only, not year or President), no tremors, sensation intact. No dysmetria. Normal heel-to-shin. Left pupil 3 mm and reactive, right pupil 5 mm, irregular, and less reactive  SKIN: no rashes, lesions, or edema Vital Signs Last 24 Hrs  T(C): 36.8 (29 Jul 2020 22:00), Max: 36.9 (29 Jul 2020 17:23)  T(F): 98.2 (29 Jul 2020 22:00), Max: 98.4 (29 Jul 2020 17:23)  HR: 90 (29 Jul 2020 22:00) (82 - 93)  BP: 143/67 (29 Jul 2020 22:00) (141/75 - 151/84)  BP(mean): --  RR: 18 (29 Jul 2020 22:00) (17 - 18)  SpO2: 97% (29 Jul 2020 22:00) (96% - 97%)    GENERAL: No acute distress  HEENT: PERRL, EOMI, MMM, no oropharyngeal lesions  NECK: supple, no stiffness, no JVD, no thyromegaly  PULM: respirations non-labored, clear to auscultation bilaterally, no rales, rhonchi, or wheezes  CV: regular rate and rhythm. III/VI RUSB systolic murmur. No gallops or rubs  GI: abdomen soft, nontender, nondistended, no masses felt, normal bowel sounds  : + fungal dermatitis along inguinal folds  MSK: strength 5/5 bilateral upper/lower extremities. No joint swelling, erythema, or warmth.  LYMPH: no anterior cervical, posterior cervical, supraclavicular, or inguinal lymphadenopathy  NEURO: A&Ox2 (person and place only, not year or President), no tremors, sensation intact. No dysmetria. Normal heel-to-shin. Left pupil 3 mm and reactive, right pupil 5 mm, irregular, and less reactive  SKIN: + inguinal rash concerning for fungal dermatitis. No edema

## 2020-07-29 NOTE — ED PROVIDER NOTE - NS_ ATTENDINGSCRIBEDETAILS _ED_A_ED_FT
I, Loree Coello DO,  performed the initial face to face bedside interview with this patient regarding history of present illness, review of symptoms and relevant past medical, social and family history.  I completed an independent physical examination.  I was the initial provider who evaluated this patient.  The history, relevant review of systems, past medical and surgical history, medical decision making, and physical examination was documented by the scribe in my presence and I attest to the accuracy of the documentation.

## 2020-07-29 NOTE — ED PROVIDER NOTE - NS ED ROS FT
Constitutional: No fever.  Eyes: No vision changes.    Ears, Nose, Mouth, Throat: No sore throat.  Cardiovascular: No chest pain.  Respiratory: No difficulty breathing.  Gastrointestinal: No nausea or vomiting.  Genitourinary: No dysuria.  Musculoskeletal: +back pain +R shoulder pain  Integumentary (skin and/or breast): No rash.  Neurological: No headache.  Psychiatric: No depression.  Endocrine:   No heat / cold intolerance.  Hematologic/Lymphatic: No easy bruising    Allergic/Immunologic:   No current allergic reactions.

## 2020-07-29 NOTE — H&P ADULT - NSHPLABSRESULTS_GEN_ALL_CORE
Labs personally reviewed and interpreted. Notable for no leukocytosis (WBC 7.89), left shift, or lymphopenia. Hb 14.5, plt 204, Na 140, K 4.1, Cl 107, HCO3 28, BUN/creatinine 11/0.7. BG elevated at 225. Calcium 9.1. Albumin 3.0. Troponin 0.217, then decreased to 0.184. ProBNP elevated at 1507. INR 1.05.  UA shows SG 1.010, trace LE, occasional bacteria with occasional epithelial cells, 0-2 WBCs/RBCs.  COVID-19 PCR negative.    CXR personally reviewed and interpreted. Notable for clear lungs, no focal consolidations, effusions, interstitial markings, pneumothorax, or obvious cardiomegaly.  CT head/C-spine personally reviewed and interpreted. Notable for involutional changes and volume loss. Scattered chronic ischemic changes in the posterior fossa. Similar findings noted previously. No acute abnormality suggested. No intracerebral hemorrhage or contusion is identified. No acute fracture identified. Underlying degenerative changes throughout the mid and lower cervical region but without significant central stenosis.  XR right hip and shoulder negative for any osseous lesion or fracture.    EKG personally reviewed. Normal sinus rhythm, normal axis with unchanged LBBB. Early repolarization in V1-2 with STD and TWI in lateral leads, alll unchanged from previous EKG. Rate 81, , QTc 513.

## 2020-07-29 NOTE — ED PROVIDER NOTE - PHYSICAL EXAMINATION
GENERALIZED APPEARANCE:  Comfortable, no acute distress.  SKIN:  Warm and dry.  HEAD: atraumatic   EYES:  Conjunctiva pink, no icterus. s/p eye sx L1mm R2mm  ENMT:  Mucus membranes moist.  CHEST AND RESPIRATORY:  Clear to auscultation with good air entry bilaterally.  HEART AND CARDIOVASCULAR:  Regular rate, no obvious murmur.  ABDOMEN AND GI:  Soft, non-tender, non-distended.  No rebound, no guarding.  EXTREMITIES:  No deformity, edema, or calf tenderness. No midline cervical tenderness. +R hip tenderness  NEURO: AAOx3, gross motor and sensory intact.  PSYCH: Normal affect.

## 2020-07-29 NOTE — ED PROVIDER NOTE - PROGRESS NOTE DETAILS
Spoke cardiology on call, Dr. Banerjee re: syncope with positive troponin. No acute intervention at this time, recommends admission with tele monitoring.

## 2020-07-29 NOTE — ED ADULT NURSE REASSESSMENT NOTE - NS ED NURSE REASSESS COMMENT FT1
Report received from day shift MAXIME Nugent for continuity of care. Patient calm and cooperative at this time, TA cleared by MD Coello, family member to return and is allowed at bedside as per ELIE Hart r/t patients advanced dementia. Family is aware that he is not to leave the room and is to wear a mask at all times. Pending disposition. Safety maintained, needs attended, will continue to monitor.

## 2020-07-29 NOTE — ED ADULT NURSE REASSESSMENT NOTE - NS ED NURSE REASSESS COMMENT FT1
Meal provided, patient aware that she will be admitted to telemetry and staying in the hospital. Pending evaluation by hospitalist and bed assignment.

## 2020-07-30 ENCOUNTER — TRANSCRIPTION ENCOUNTER (OUTPATIENT)
Age: 75
End: 2020-07-30

## 2020-07-30 LAB
24R-OH-CALCIDIOL SERPL-MCNC: 21.5 NG/ML — LOW (ref 30–80)
A1C WITH ESTIMATED AVERAGE GLUCOSE RESULT: 11.7 % — HIGH (ref 4–5.6)
ADD ON TEST-SPECIMEN IN LAB: SIGNIFICANT CHANGE UP
ADD ON TEST-SPECIMEN IN LAB: SIGNIFICANT CHANGE UP
ANION GAP SERPL CALC-SCNC: 6 MMOL/L — SIGNIFICANT CHANGE UP (ref 5–17)
BUN SERPL-MCNC: 15 MG/DL — SIGNIFICANT CHANGE UP (ref 7–23)
CALCIUM SERPL-MCNC: 9 MG/DL — SIGNIFICANT CHANGE UP (ref 8.5–10.1)
CHLORIDE SERPL-SCNC: 106 MMOL/L — SIGNIFICANT CHANGE UP (ref 96–108)
CO2 SERPL-SCNC: 28 MMOL/L — SIGNIFICANT CHANGE UP (ref 22–31)
CREAT SERPL-MCNC: 0.64 MG/DL — SIGNIFICANT CHANGE UP (ref 0.5–1.3)
ESTIMATED AVERAGE GLUCOSE: 289 MG/DL — HIGH (ref 68–114)
GLUCOSE SERPL-MCNC: 235 MG/DL — HIGH (ref 70–99)
HCT VFR BLD CALC: 44.5 % — SIGNIFICANT CHANGE UP (ref 34.5–45)
HCV AB S/CO SERPL IA: 0.17 S/CO — SIGNIFICANT CHANGE UP (ref 0–0.99)
HCV AB SERPL-IMP: SIGNIFICANT CHANGE UP
HGB BLD-MCNC: 14.2 G/DL — SIGNIFICANT CHANGE UP (ref 11.5–15.5)
MAGNESIUM SERPL-MCNC: 1.9 MG/DL — SIGNIFICANT CHANGE UP (ref 1.6–2.6)
MCHC RBC-ENTMCNC: 29 PG — SIGNIFICANT CHANGE UP (ref 27–34)
MCHC RBC-ENTMCNC: 31.9 GM/DL — LOW (ref 32–36)
MCV RBC AUTO: 90.8 FL — SIGNIFICANT CHANGE UP (ref 80–100)
PHOSPHATE SERPL-MCNC: 3.1 MG/DL — SIGNIFICANT CHANGE UP (ref 2.5–4.5)
PLATELET # BLD AUTO: 219 K/UL — SIGNIFICANT CHANGE UP (ref 150–400)
POTASSIUM SERPL-MCNC: 4 MMOL/L — SIGNIFICANT CHANGE UP (ref 3.5–5.3)
POTASSIUM SERPL-SCNC: 4 MMOL/L — SIGNIFICANT CHANGE UP (ref 3.5–5.3)
RBC # BLD: 4.9 M/UL — SIGNIFICANT CHANGE UP (ref 3.8–5.2)
RBC # FLD: 13.6 % — SIGNIFICANT CHANGE UP (ref 10.3–14.5)
SARS-COV-2 IGG SERPL QL IA: NEGATIVE — SIGNIFICANT CHANGE UP
SARS-COV-2 IGM SERPL IA-ACNC: <0.1 INDEX — SIGNIFICANT CHANGE UP
SODIUM SERPL-SCNC: 140 MMOL/L — SIGNIFICANT CHANGE UP (ref 135–145)
TROPONIN I SERPL-MCNC: 0.16 NG/ML — HIGH (ref 0.01–0.04)
VIT B12 SERPL-MCNC: 759 PG/ML — SIGNIFICANT CHANGE UP (ref 232–1245)
WBC # BLD: 10.13 K/UL — SIGNIFICANT CHANGE UP (ref 3.8–10.5)
WBC # FLD AUTO: 10.13 K/UL — SIGNIFICANT CHANGE UP (ref 3.8–10.5)

## 2020-07-30 PROCEDURE — 99233 SBSQ HOSP IP/OBS HIGH 50: CPT

## 2020-07-30 RX ORDER — ERGOCALCIFEROL 1.25 MG/1
1 CAPSULE ORAL
Qty: 0 | Refills: 0 | DISCHARGE

## 2020-07-30 RX ORDER — CHOLECALCIFEROL (VITAMIN D3) 125 MCG
2000 CAPSULE ORAL DAILY
Refills: 0 | Status: DISCONTINUED | OUTPATIENT
Start: 2020-07-30 | End: 2020-08-01

## 2020-07-30 RX ORDER — GLUCAGON INJECTION, SOLUTION 0.5 MG/.1ML
1 INJECTION, SOLUTION SUBCUTANEOUS ONCE
Refills: 0 | Status: DISCONTINUED | OUTPATIENT
Start: 2020-07-30 | End: 2020-08-01

## 2020-07-30 RX ORDER — DEXTROSE 50 % IN WATER 50 %
25 SYRINGE (ML) INTRAVENOUS ONCE
Refills: 0 | Status: DISCONTINUED | OUTPATIENT
Start: 2020-07-30 | End: 2020-08-01

## 2020-07-30 RX ORDER — DEXTROSE 50 % IN WATER 50 %
15 SYRINGE (ML) INTRAVENOUS ONCE
Refills: 0 | Status: DISCONTINUED | OUTPATIENT
Start: 2020-07-30 | End: 2020-08-01

## 2020-07-30 RX ORDER — CANAGLIFLOZIN 100 MG/1
0 TABLET, FILM COATED ORAL
Qty: 0 | Refills: 0 | DISCHARGE

## 2020-07-30 RX ORDER — HEPARIN SODIUM 5000 [USP'U]/ML
5000 INJECTION INTRAVENOUS; SUBCUTANEOUS EVERY 8 HOURS
Refills: 0 | Status: DISCONTINUED | OUTPATIENT
Start: 2020-07-30 | End: 2020-08-01

## 2020-07-30 RX ORDER — INSULIN LISPRO 100/ML
VIAL (ML) SUBCUTANEOUS
Refills: 0 | Status: DISCONTINUED | OUTPATIENT
Start: 2020-07-30 | End: 2020-08-01

## 2020-07-30 RX ORDER — DEXTROSE 50 % IN WATER 50 %
12.5 SYRINGE (ML) INTRAVENOUS ONCE
Refills: 0 | Status: DISCONTINUED | OUTPATIENT
Start: 2020-07-30 | End: 2020-08-01

## 2020-07-30 RX ORDER — INSULIN GLARGINE 100 [IU]/ML
5 INJECTION, SOLUTION SUBCUTANEOUS AT BEDTIME
Refills: 0 | Status: DISCONTINUED | OUTPATIENT
Start: 2020-07-30 | End: 2020-08-01

## 2020-07-30 RX ORDER — CALCIUM CARBONATE 500(1250)
1 TABLET ORAL DAILY
Refills: 0 | Status: DISCONTINUED | OUTPATIENT
Start: 2020-07-30 | End: 2020-08-01

## 2020-07-30 RX ORDER — ASPIRIN/CALCIUM CARB/MAGNESIUM 324 MG
1 TABLET ORAL
Qty: 0 | Refills: 0 | DISCHARGE

## 2020-07-30 RX ORDER — NYSTATIN CREAM 100000 [USP'U]/G
1 CREAM TOPICAL THREE TIMES A DAY
Refills: 0 | Status: DISCONTINUED | OUTPATIENT
Start: 2020-07-30 | End: 2020-08-01

## 2020-07-30 RX ORDER — INSULIN LISPRO 100/ML
VIAL (ML) SUBCUTANEOUS AT BEDTIME
Refills: 0 | Status: DISCONTINUED | OUTPATIENT
Start: 2020-07-30 | End: 2020-08-01

## 2020-07-30 RX ADMIN — NYSTATIN CREAM 1 APPLICATION(S): 100000 CREAM TOPICAL at 21:49

## 2020-07-30 RX ADMIN — HEPARIN SODIUM 5000 UNIT(S): 5000 INJECTION INTRAVENOUS; SUBCUTANEOUS at 21:50

## 2020-07-30 RX ADMIN — Medication 1 TABLET(S): at 11:30

## 2020-07-30 RX ADMIN — Medication 2: at 12:27

## 2020-07-30 RX ADMIN — Medication 2: at 08:29

## 2020-07-30 RX ADMIN — Medication 2000 UNIT(S): at 11:31

## 2020-07-30 RX ADMIN — Medication 3: at 17:34

## 2020-07-30 RX ADMIN — HEPARIN SODIUM 5000 UNIT(S): 5000 INJECTION INTRAVENOUS; SUBCUTANEOUS at 14:00

## 2020-07-30 RX ADMIN — Medication 1: at 21:49

## 2020-07-30 RX ADMIN — NYSTATIN CREAM 1 APPLICATION(S): 100000 CREAM TOPICAL at 14:02

## 2020-07-30 RX ADMIN — INSULIN GLARGINE 5 UNIT(S): 100 INJECTION, SOLUTION SUBCUTANEOUS at 21:49

## 2020-07-30 NOTE — PHYSICAL THERAPY INITIAL EVALUATION ADULT - GENERAL OBSERVATIONS, REHAB EVAL
recumbent in bed ,resting after lunch meal,reports need to urinate ,encouraged to get up and use bathroom with assistance; pt is pleasant & receptive ,+pleasantly confused

## 2020-07-30 NOTE — PHYSICAL THERAPY INITIAL EVALUATION ADULT - MODALITIES TREATMENT COMMENTS
pt with urgent need to void upon standing out of bed, escorted to toilet and toileted with RW/R grab bar,CG/CCgx1,able to wipe self seated

## 2020-07-30 NOTE — PHYSICAL THERAPY INITIAL EVALUATION ADULT - CRITERIA FOR SKILLED THERAPEUTIC INTERVENTIONS
risk reduction/prevention/anticipated discharge recommendation/functional limitations in following categories/predicted duration of therapy intervention/anticipated equipment needs at discharge/impairments found/therapy frequency/rehab potential

## 2020-07-30 NOTE — DISCHARGE NOTE NURSING/CASE MANAGEMENT/SOCIAL WORK - PATIENT PORTAL LINK FT
You can access the FollowMyHealth Patient Portal offered by [facility] by registering at the following website: [email or web address]. By joining [employer]’s FollowMyHealth portal, you will also be able to view your health information using other applications (apps) compatible with our system.

## 2020-07-30 NOTE — CHART NOTE - NSCHARTNOTEFT_GEN_A_CORE
Discussed plan w/ , Rhys.  Understands she will stay tonight, monitor heart on tele, Cardio follow up.   states she just saw Dr. Paraad about 1 week ago.  He is uncertain if she had Echo.   He is also aware of hx diabetes (discussed A1c is very elevated and she needs better control).  He states she hasn't been taking meds for at least a year b/c she has dementia.  He  agrees to try to give her lantus and oral pills.  He will come tomorrow around 3pm for diabetic education.

## 2020-07-30 NOTE — PHYSICAL THERAPY INITIAL EVALUATION ADULT - PLANNED THERAPY INTERVENTIONS, PT EVAL
transfer training/ROM/strengthening/bed mobility training/gait training/home safety/fall prevention education

## 2020-07-30 NOTE — PHARMACOTHERAPY INTERVENTION NOTE - COMMENTS
Spoke to patients  via telephone to obtain medication list; as per  only taking Vitamin D and calcium; pt was prescribed Lipitor 20 mg QHS but  reports patient not taking

## 2020-07-30 NOTE — PROGRESS NOTE ADULT - SUBJECTIVE AND OBJECTIVE BOX
cc: fall  hpi: 75y female w/ pmh dementia, cva, CAD s/p stent 2016, hx AS  (as per 2017 Cardio notes) presents w/ unwitnessed fall at home.    ros-    Vital Signs Last 24 Hrs  T(C): 36.5 (30 Jul 2020 08:29), Max: 37 (30 Jul 2020 02:13)  T(F): 97.7 (30 Jul 2020 08:29), Max: 98.6 (30 Jul 2020 02:13)  HR: 70 (30 Jul 2020 08:29) (70 - 93)  BP: 149/62 (30 Jul 2020 08:29) (141/75 - 166/71)  BP(mean): --  RR: 17 (30 Jul 2020 08:29) (17 - 18)  SpO2: 98% (30 Jul 2020 08:29) (96% - 98%)    physical            LABS: All Labs Reviewed:                        14.2   10.13 )-----------( 219      ( 30 Jul 2020 07:51 )             44.5     07-29    140  |  107  |  11  ----------------------------<  225<H>  4.1   |  28  |  0.70    Ca    9.1      29 Jul 2020 17:19    TPro  7.2  /  Alb  3.0<L>  /  TBili  0.7  /  DBili  x   /  AST  22  /  ALT  25  /  AlkPhos  79  07-29    PT/INR - ( 29 Jul 2020 20:38 )   PT: 12.3 sec;   INR: 1.05 ratio         PTT - ( 29 Jul 2020 20:38 )  PTT:35.8 sec  CARDIAC MARKERS ( 29 Jul 2020 20:38 )  0.184 ng/mL / x     / x     / x     / x      CARDIAC MARKERS ( 29 Jul 2020 17:19 )  0.217 ng/mL / x     / x     / x     / x            < from: CT Cervical Spine No Cont (07.29.20 @ 18:29) >  IMPRESSION:  No acute fracture identified. Underlying degenerative changes throughout the mid and lower cervical region but without significant central stenosis.    < end of copied text >    < from: CT Head No Cont (07.29.20 @ 18:29) >  IMPRESSION:  1)  involutional changes and volume loss. Scattered chronic ischemic changes in the posterior fossa. Similar findings noted previously. No acute abnormality suggested..  2)  no intracerebral hemorrhage or contusion is identified.    < end of copied text >      MEDICATIONS  (STANDING):  calcium carbonate   1250 mG (OsCal) 1 Tablet(s) Oral daily  cholecalciferol 2000 Unit(s) Oral daily  dextrose 50% Injectable 12.5 Gram(s) IV Push once  dextrose 50% Injectable 25 Gram(s) IV Push once  dextrose 50% Injectable 25 Gram(s) IV Push once  insulin lispro (HumaLOG) corrective regimen sliding scale   SubCutaneous three times a day before meals  insulin lispro (HumaLOG) corrective regimen sliding scale   SubCutaneous at bedtime  nystatin Powder 1 Application(s) Topical three times a day    MEDICATIONS  (PRN):  dextrose 40% Gel 15 Gram(s) Oral once PRN Blood Glucose LESS THAN 70 milliGRAM(s)/deciliter  glucagon  Injectable 1 milliGRAM(s) IntraMuscular once PRN Glucose LESS THAN 70 milligrams/deciliter        Assessment and Plan:     1. fall, possible syncope  - trops trending down- repeat   - tele-  - need orthostatic vitals  - CT head no acute changes,  imaging no fractures  - will consult Cardio (as per 2017 notes, hx AS.  uncertain when prior echo)  - PT eval    2. dementia cc: fall  hpi: 75y female w/ pmh dementia, cva, CAD s/p stent 2016, hx AS  (as per 2017 Cardio notes) presents w/ unwitnessed fall at home.  pt states she was walking in kitchen and next thing she recalls is being found on floor by .  Denies hitting head, no preceding dizziness, cp, sob, palp.   States she wasn't getting up from seated to standing position.   Feels well today- no complaints.      ros- as per hpi, other 10 point ros negative    Vital Signs Last 24 Hrs  T(C): 36.5 (30 Jul 2020 08:29), Max: 37 (30 Jul 2020 02:13)  T(F): 97.7 (30 Jul 2020 08:29), Max: 98.6 (30 Jul 2020 02:13)  HR: 70 (30 Jul 2020 08:29) (70 - 93)  BP: 149/62 (30 Jul 2020 08:29) (141/75 - 166/71)  BP(mean): --  RR: 17 (30 Jul 2020 08:29) (17 - 18)  SpO2: 98% (30 Jul 2020 08:29) (96% - 98%)      PHYSICAL EXAM:  General: NAD, comfortable- seated in chair  Neuro: AAOx3, no focal deficits  HEENT: NCAT, EOMI  Neck: Soft and supple  Respiratory: CTA b/l, no w/r/r  Cardiovascular: S1 and S2, RRR,  EDOUARD  Gastrointestinal: non ttp , soft  Extremities: No c/c/e  Vascular: 2+ peripheral pulses  Musculoskeletal: 5/5 strength b/l UE and LE, sensation intact        LABS: All Labs Reviewed:                        14.2   10.13 )-----------( 219      ( 30 Jul 2020 07:51 )               44.5     07-29    140  |  107  |  11  ----------------------------<  225<H>  4.1   |  28  |  0.70    Ca    9.1      29 Jul 2020 17:19    TPro  7.2  /  Alb  3.0<L>  /  TBili  0.7  /  DBili  x   /  AST  22  /  ALT  25  /  AlkPhos  79  07-29    PT/INR - ( 29 Jul 2020 20:38 )   PT: 12.3 sec;   INR: 1.05 ratio         PTT - ( 29 Jul 2020 20:38 )  PTT:35.8 sec  CARDIAC MARKERS ( 29 Jul 2020 20:38 )  0.184 ng/mL / x     / x     / x     / x      CARDIAC MARKERS ( 29 Jul 2020 17:19 )  0.217 ng/mL / x     / x     / x     / x            < from: CT Cervical Spine No Cont (07.29.20 @ 18:29) >  IMPRESSION:  No acute fracture identified. Underlying degenerative changes throughout the mid and lower cervical region but without significant central stenosis.    < end of copied text >    < from: CT Head No Cont (07.29.20 @ 18:29) >  IMPRESSION:  1)  involutional changes and volume loss. Scattered chronic ischemic changes in the posterior fossa. Similar findings noted previously. No acute abnormality suggested..  2)  no intracerebral hemorrhage or contusion is identified.    < end of copied text >      MEDICATIONS  (STANDING):  calcium carbonate   1250 mG (OsCal) 1 Tablet(s) Oral daily  cholecalciferol 2000 Unit(s) Oral daily  dextrose 50% Injectable 12.5 Gram(s) IV Push once  dextrose 50% Injectable 25 Gram(s) IV Push once  dextrose 50% Injectable 25 Gram(s) IV Push once  insulin lispro (HumaLOG) corrective regimen sliding scale   SubCutaneous three times a day before meals  insulin lispro (HumaLOG) corrective regimen sliding scale   SubCutaneous at bedtime  nystatin Powder 1 Application(s) Topical three times a day    MEDICATIONS  (PRN):  dextrose 40% Gel 15 Gram(s) Oral once PRN Blood Glucose LESS THAN 70 milliGRAM(s)/deciliter  glucagon  Injectable 1 milliGRAM(s) IntraMuscular once PRN Glucose LESS THAN 70 milligrams/deciliter        Assessment and Plan:     1. fall, possible syncope  - trops trending down- repeat   - tele-  - need orthostatic vitals- discussed w/ RN  - CT head no acute changes,  imaging no fractures  - no signs infection, ua neg, cxr no infiltrates , no fevers   - will consult Cardio (as per 2017 notes, hx AS.  uncertain when prior echo)  - PT eval    2. dementia  mild, pt currently aaox3 and able to discuss most events of yesterday  discussed plan w/ her and she is able to repeat-  also discussed calling  to review plan    3. uncontrolled diabetes  A1c 11.7 (was 9 in 2017).  ?no home meds listed  - needs to start lantus, monitor fs, diabetic diet, diabetic education for pt and     4. dvt px

## 2020-07-30 NOTE — ED ADULT NURSE REASSESSMENT NOTE - NS ED NURSE REASSESS COMMENT FT1
Received report from MAXIME Butcher.  Patient baseline mental status, A&O x1.  Patient resting comfortably in stretcher.  Purwick connected to suction, draining without difficulty.  Patient awaiting bed on inpatient unit.  VSS.  Call bell within reach, safety maintained.

## 2020-07-30 NOTE — CONSULT NOTE ADULT - SUBJECTIVE AND OBJECTIVE BOX
Patient is a 75y old  Female who presents with a chief complaint of unwitnessed fall with elevated troponin (2020 09:02)      HPI:  76 yo F with a PMH dementia, DM2 (not on meds), breast cancer, LBBB, and CVA who presents after a fall. The patient states she turned the wrong way and fell in the kitchen. She does not recall the details of the fall but thinks she may have passed out. She does not remember if she was feeling dizzy before. She denies any symptoms since and feels well. She says she does not normally fall down.    Her  states she has fallen down many times because of difficulty walking. She usually walks with a walker but during this episode, she had not walked with a walker. He states normally she does not pass out from falling, although he acknowledges this current fall was unwitnessed.  She follows with Dr. Parada for her LBBB and saw him last week.    In the ED, she was not given any medication. (2020 23:57)      PAST MEDICAL & SURGICAL HISTORY:  LBBB (left bundle branch block)  Dementia  Cerebrovascular accident (CVA)  Breast cancer  DM (diabetes mellitus): type 2, not on meds  Carotid stent occlusion      PREVIOUS CARDIAC WORKUP:      Echo:  Stress Test:  Cardiac Cath:    ALLERGIES:    No Known Allergies       MEDICATIONS  (STANDING):  calcium carbonate   1250 mG (OsCal) 1 Tablet(s) Oral daily  cholecalciferol 2000 Unit(s) Oral daily  dextrose 50% Injectable 12.5 Gram(s) IV Push once  dextrose 50% Injectable 25 Gram(s) IV Push once  dextrose 50% Injectable 25 Gram(s) IV Push once  heparin   Injectable 5000 Unit(s) SubCutaneous every 8 hours  insulin glargine Injectable (LANTUS) 5 Unit(s) SubCutaneous at bedtime  insulin lispro (HumaLOG) corrective regimen sliding scale   SubCutaneous three times a day before meals  insulin lispro (HumaLOG) corrective regimen sliding scale   SubCutaneous at bedtime  nystatin Powder 1 Application(s) Topical three times a day    MEDICATIONS  (PRN):  dextrose 40% Gel 15 Gram(s) Oral once PRN Blood Glucose LESS THAN 70 milliGRAM(s)/deciliter  glucagon  Injectable 1 milliGRAM(s) IntraMuscular once PRN Glucose LESS THAN 70 milligrams/deciliter      FAMILY HISTORY:  No pertinent family history: of cancer        SOCIAL HISTORY:  .scl     ROS:     .ros    Vital Signs Last 24 Hrs  T(C): 36.5 (2020 08:29), Max: 37 (2020 02:13)  T(F): 97.7 (2020 08:29), Max: 98.6 (2020 02:13)  HR: 70 (2020 08:29) (70 - 93)  BP: 149/62 (2020 08:29) (141/75 - 166/71)  BP(mean): --  RR: 17 (2020 08:29) (17 - 18)  SpO2: 98% (2020 08:29) (96% - 98%)    I&O's Summary      PHYSICAL EXAM:    .phy      TELEMETRY:    ECG:    LABS:                          14.2   10.13 )-----------( 219      ( 2020 07:51 )             44.5     07-30    140  |  106  |  15  ----------------------------<  235<H>  4.0   |  28  |  0.64    Ca    9.0      2020 07:51  Phos  3.1     0730  Mg     1.9     30    TPro  7.2  /  Alb  3.0<L>  /  TBili  0.7  /  DBili  x   /  AST  22  /  ALT  25  /  AlkPhos  79          07-30 @ 07:51  Trop-I  0.162  CK      59  CK-MB   --     @ 20:38  Trop-I  0.184  CK      --  CK-MB   --     @ 17:19  Trop-I  0.217  CK      --  CK-MB   --    Pro BNP  1507  @ 17:19  D Dimer  --  @ 17:19    PT/INR - ( 2020 20:38 )   PT: 12.3 sec;   INR: 1.05 ratio         PTT - ( 2020 20:38 )  PTT:35.8 sec  Urinalysis Basic - ( 2020 17:19 )    Color: Yellow / Appearance: Clear / S.010 / pH: x  Gluc: x / Ketone: Negative  / Bili: Negative / Urobili: Negative mg/dL   Blood: x / Protein: Negative mg/dL / Nitrite: Negative   Leuk Esterase: Trace / RBC: 0-2 /HPF / WBC 0-2   Sq Epi: x / Non Sq Epi: Occasional / Bacteria: Occasional            RADIOLOGY & ADDITIONAL STUDIES: Chief complaint of unwitnessed fall with elevated troponin (2020 09:02)      HPI:  74 yo female with an unwitnessed fall at home. Possible syncope, but has many falls at home. Pt is a poor historian sec to dementia. Family is not available at this time. History from chart review and office records. Seen last month at the office after a long interim of about 3 years. She has severe AS. Had an echo that showed progression of AS. Has not had a follow up visit yet.  Dementia with pleasantly confused and forgetful status. She is able to ambulate       PAST MEDICAL & SURGICAL HISTORY:  AS  CAD, [place] of LCx 10/20/16  LBBB (left bundle branch block)  Dementia  Cerebrovascular accident (CVA)  Breast cancer  DM (diabetes mellitus): type 2, not on meds  Carotid stent occlusion      PREVIOUS CARDIAC WORKUP:      Echo:  20 Nml LV EF, severe LVH, Severe AS. Mild MS, Mod TR      ALLERGIES:    No Known Allergies       MEDICATIONS  (STANDING):  calcium carbonate   1250 mG (OsCal) 1 Tablet(s) Oral daily  cholecalciferol 2000 Unit(s) Oral daily  dextrose 50% Injectable 12.5 Gram(s) IV Push once  dextrose 50% Injectable 25 Gram(s) IV Push once  dextrose 50% Injectable 25 Gram(s) IV Push once  heparin   Injectable 5000 Unit(s) SubCutaneous every 8 hours  insulin glargine Injectable (LANTUS) 5 Unit(s) SubCutaneous at bedtime  insulin lispro (HumaLOG) corrective regimen sliding scale   SubCutaneous three times a day before meals  insulin lispro (HumaLOG) corrective regimen sliding scale   SubCutaneous at bedtime  nystatin Powder 1 Application(s) Topical three times a day    MEDICATIONS  (PRN):  dextrose 40% Gel 15 Gram(s) Oral once PRN Blood Glucose LESS THAN 70 milliGRAM(s)/deciliter  glucagon  Injectable 1 milliGRAM(s) IntraMuscular once PRN Glucose LESS THAN 70 milligrams/deciliter      FAMILY HISTORY:  No pertinent family history: of cancer        SOCIAL HISTORY:  Nonsmoker. No ETOH abuse. No illicit drugs.     ROS:   Not reliable.    Detailed ten system ROS was performed and was negative except for history as eluded to above.    no fever  no chills  no nausea  no vomiting  no diarrhea  no constipation  no melena  no hematochezia  no chest pain  no palpitations  no sob at rest  no dyspnea on exertion  no cough  no wheezing  no anorexia  no headache  no dizziness  no syncope  no weakness  no myalgia  no dysuria  no polyuria  no hematuria       Vital Signs Last 24 Hrs  T(C): 36.5 (2020 08:29), Max: 37 (2020 02:13)  T(F): 97.7 (2020 08:29), Max: 98.6 (2020 02:13)  HR: 70 (2020 08:29) (70 - 93)  BP: 149/62 (2020 08:29) (141/75 - 166/71)  RR: 17 (2020 08:29) (17 - 18)  SpO2: 98% (2020 08:29) (96% - 98%)      PHYSICAL EXAM:    General:                Comfortable, AAO X 1, in no distress.   HEENT:                  Atraumatic, PERRLA, EOMI, conjunctiva clear.    Neck:                     Supple, no adenopathy, no thyromegaly, no JVD, no bruit.   Chest:                    Clear, B/L symmetric air entry, no tachypnea  Heart:                     S1, S2 normal, no gallop, + murmur.  Abdomen:              Soft, non-tender, bowel sounds active. No palpable masses.  Extremities:           no cyanosis, no edema. Peripheral pulses normal.  Skin:                      Skin color, texture normal. No rashes.  Neurologic:            Grossly nonfocal.       TELEMETRY:   Normal sinus rhythm with no tachy or layla events     ECG:   NSR, LBBB.     LABS:                          14.2   10.13 )-----------( 219      ( 2020 07:51 )             44.5     0730    140  |  106  |  15  ----------------------------<  235<H>  4.0   |  28  |  0.64    Ca    9.0      2020 07:51  Phos  3.1     0730  Mg     1.9     30    TPro  7.2  /  Alb  3.0<L>  /  TBili  0.7  /  DBili  x   /  AST  22  /  ALT  25  /  AlkPhos  79          07-30 @ 07:51  Trop-I  0.162  CK      59     @ 20:38  Trop-I  0.184     @ 17:19  Trop-I  0.217    Pro BNP  1507  @ 17:19      PT/INR - ( 2020 20:38 )   PT: 12.3 sec;   INR: 1.05 ratio    PTT - ( 2020 20:38 )  PTT:35.8 sec    Urinalysis Basic - ( 2020 17:19 )    Color: Yellow / Appearance: Clear / S.010 / pH: x  Gluc: x / Ketone: Negative  / Bili: Negative / Urobili: Negative mg/dL   Blood: x / Protein: Negative mg/dL / Nitrite: Negative   Leuk Esterase: Trace / RBC: 0-2 /HPF / WBC 0-2   Sq Epi: x / Non Sq Epi: Occasional / Bacteria: Occasional      RADIOLOGY & ADDITIONAL STUDIES:    Xray Chest 1 View AP/PA. (20 @ 18:20) >   Impression: No active disease

## 2020-07-30 NOTE — PHYSICAL THERAPY INITIAL EVALUATION ADULT - LEVEL OF INDEPENDENCE: SIT/SUPINE, REHAB EVAL
contact guard/returned to bed as RN wishes to perform orthostatic VS ,she reports pt was out of bed to chair earlier and only recently returned to bed/minimum assist (75% patients effort)

## 2020-07-30 NOTE — CONSULT NOTE ADULT - ASSESSMENT
Fall, ? Syncope  Severe AS  Elevated CE, currently chest pain free. ? Demand related vs NSTEMI  CAD by history  LBBB  Dementia    Suggest:    Will discuss with family reg TAVR. ? candidacy given dementia.  Will need cardiac cath before TAVR  Keep well hydrated.  Monitor on tele

## 2020-07-30 NOTE — PHYSICAL THERAPY INITIAL EVALUATION ADULT - PERTINENT HX OF CURRENT PROBLEM, REHAB EVAL
unwitnessed fall in kitchen at home,was not using her walker ,pt with dementia /poor historian ,unclear if she fainted

## 2020-07-30 NOTE — PHYSICAL THERAPY INITIAL EVALUATION ADULT - MANUAL MUSCLE TESTING RESULTS, REHAB EVAL
WFL, able to elevate all limbs against gravity plus mild resistance; PT IS VERY " TICKLISH" WHEN FEET HANDLED

## 2020-07-31 PROCEDURE — 99233 SBSQ HOSP IP/OBS HIGH 50: CPT

## 2020-07-31 RX ORDER — SODIUM CHLORIDE 9 MG/ML
1000 INJECTION INTRAMUSCULAR; INTRAVENOUS; SUBCUTANEOUS
Refills: 0 | Status: DISCONTINUED | OUTPATIENT
Start: 2020-07-31 | End: 2020-08-01

## 2020-07-31 RX ORDER — ISOPROPYL ALCOHOL, BENZOCAINE .7; .06 ML/ML; ML/ML
1 SWAB TOPICAL
Qty: 30 | Refills: 1
Start: 2020-07-31 | End: 2020-09-18

## 2020-07-31 RX ORDER — METFORMIN HYDROCHLORIDE 850 MG/1
1 TABLET ORAL
Qty: 60 | Refills: 0
Start: 2020-07-31 | End: 2020-08-29

## 2020-07-31 RX ORDER — INSULIN GLARGINE 100 [IU]/ML
10 INJECTION, SOLUTION SUBCUTANEOUS
Qty: 0 | Refills: 0 | DISCHARGE
Start: 2020-07-31

## 2020-07-31 RX ORDER — ENOXAPARIN SODIUM 100 MG/ML
10 INJECTION SUBCUTANEOUS
Qty: 100 | Refills: 0
Start: 2020-07-31 | End: 2020-08-29

## 2020-07-31 RX ADMIN — NYSTATIN CREAM 1 APPLICATION(S): 100000 CREAM TOPICAL at 05:52

## 2020-07-31 RX ADMIN — NYSTATIN CREAM 1 APPLICATION(S): 100000 CREAM TOPICAL at 13:52

## 2020-07-31 RX ADMIN — Medication 2000 UNIT(S): at 08:24

## 2020-07-31 RX ADMIN — NYSTATIN CREAM 1 APPLICATION(S): 100000 CREAM TOPICAL at 21:28

## 2020-07-31 RX ADMIN — HEPARIN SODIUM 5000 UNIT(S): 5000 INJECTION INTRAVENOUS; SUBCUTANEOUS at 13:51

## 2020-07-31 RX ADMIN — HEPARIN SODIUM 5000 UNIT(S): 5000 INJECTION INTRAVENOUS; SUBCUTANEOUS at 05:57

## 2020-07-31 RX ADMIN — Medication 3: at 22:23

## 2020-07-31 RX ADMIN — Medication 1 TABLET(S): at 08:24

## 2020-07-31 RX ADMIN — SODIUM CHLORIDE 75 MILLILITER(S): 9 INJECTION INTRAMUSCULAR; INTRAVENOUS; SUBCUTANEOUS at 12:03

## 2020-07-31 RX ADMIN — Medication 1: at 08:24

## 2020-07-31 RX ADMIN — INSULIN GLARGINE 5 UNIT(S): 100 INJECTION, SOLUTION SUBCUTANEOUS at 22:21

## 2020-07-31 RX ADMIN — Medication 2: at 12:03

## 2020-07-31 RX ADMIN — HEPARIN SODIUM 5000 UNIT(S): 5000 INJECTION INTRAVENOUS; SUBCUTANEOUS at 22:22

## 2020-07-31 RX ADMIN — Medication 2: at 17:12

## 2020-07-31 NOTE — CHART NOTE - NSCHARTNOTEFT_GEN_A_CORE
Patient still w/ positive orthostatic vitals.  Continue ivf. Monitor overnight and repeat vitals in morning.   Pharmacy discussed diabetic education w/ - he states he will not be able to inject insulin because of hands/arthritis and wife won't be  able to because dementia.   For now, rec po  meds and close follow up outpatient w/ primary doc in 1 week to repeat labs and increase meds.  Also will need strict diet control and repeat a1c.

## 2020-07-31 NOTE — PROGRESS NOTE ADULT - SUBJECTIVE AND OBJECTIVE BOX
cc: fall  hpi: 75y female w/ pmh dementia, cva, CAD s/p stent 2016, hx AS  (as per 2017 Cardio notes) presents w/ unwitnessed fall at home.  pt states she was walking in kitchen and next thing she recalls is being found on floor by .  Denies hitting head, no preceding dizziness, cp, sob, palp.   States she wasn't getting up from seated to standing position.   Feels well today- no complaints.      7/31- no complaints.  discussed severe AS w/ pt,  and Cardio- pt refuses further eval;  states he wants pt home and then will discuss further w/ family and follow up outpatient.  Discussed positive orthostatic vitals and ivf this morning- repeat vitals in afternoon prior to possible d/c home.   will still come at 3pm either way for diabetic education.      ros- as per hpi, other 10 point ros negative    Vital Signs Last 24 Hrs  T(C): 36.6 (31 Jul 2020 08:01), Max: 36.6 (31 Jul 2020 08:01)  T(F): 97.8 (31 Jul 2020 08:01), Max: 97.8 (31 Jul 2020 08:01)  HR: 62 (31 Jul 2020 08:01) (62 - 102)  BP: 126/83 (31 Jul 2020 08:01) (126/83 - 143/78)  BP(mean): --  RR: 18 (31 Jul 2020 08:01) (18 - 18)  SpO2: 97% (31 Jul 2020 08:01) (96% - 97%)    PHYSICAL EXAM:  General: NAD, comfortable- seated in chair  Neuro: AAOx3, no focal deficits; very forgetful  HEENT: NCAT, EOMI  Neck: Soft and supple  Respiratory: CTA b/l, no w/r/r  Cardiovascular: S1 and S2, RRR,  EDOUARD  Gastrointestinal: non ttp , soft  Extremities: No c/c/e  Vascular: 2+ peripheral pulses  Musculoskeletal: 5/5 strength b/l UE and LE, sensation intact        LABS: All Labs Reviewed:                         14.2   10.13 )-----------( 219      ( 30 Jul 2020 07:51 )               44.5     07-29    140  |  107  |  11  ----------------------------<  225<H>  4.1   |  28  |  0.70    Ca    9.1      29 Jul 2020 17:19    TPro  7.2  /  Alb  3.0<L>  /  TBili  0.7  /  DBili  x   /  AST  22  /  ALT  25  /  AlkPhos  79  07-29    PT/INR - ( 29 Jul 2020 20:38 )   PT: 12.3 sec;   INR: 1.05 ratio         PTT - ( 29 Jul 2020 20:38 )  PTT:35.8 sec  CARDIAC MARKERS ( 29 Jul 2020 20:38 )  0.184 ng/mL / x     / x     / x     / x      CARDIAC MARKERS ( 29 Jul 2020 17:19 )  0.217 ng/mL / x     / x     / x     / x            < from: CT Cervical Spine No Cont (07.29.20 @ 18:29) >  IMPRESSION:  No acute fracture identified. Underlying degenerative changes throughout the mid and lower cervical region but without significant central stenosis.    < end of copied text >    < from: CT Head No Cont (07.29.20 @ 18:29) >  IMPRESSION:  1)  involutional changes and volume loss. Scattered chronic ischemic changes in the posterior fossa. Similar findings noted previously. No acute abnormality suggested..  2)  no intracerebral hemorrhage or contusion is identified.    < end of copied text >      MEDICATIONS  (STANDING):  calcium carbonate   1250 mG (OsCal) 1 Tablet(s) Oral daily  cholecalciferol 2000 Unit(s) Oral daily  dextrose 50% Injectable 12.5 Gram(s) IV Push once  dextrose 50% Injectable 25 Gram(s) IV Push once  dextrose 50% Injectable 25 Gram(s) IV Push once  heparin   Injectable 5000 Unit(s) SubCutaneous every 8 hours  insulin glargine Injectable (LANTUS) 5 Unit(s) SubCutaneous at bedtime  insulin lispro (HumaLOG) corrective regimen sliding scale   SubCutaneous three times a day before meals  insulin lispro (HumaLOG) corrective regimen sliding scale   SubCutaneous at bedtime  nystatin Powder 1 Application(s) Topical three times a day  sodium chloride 0.9%. 1000 milliLiter(s) (75 mL/Hr) IV Continuous <Continuous>    MEDICATIONS  (PRN):  dextrose 40% Gel 15 Gram(s) Oral once PRN Blood Glucose LESS THAN 70 milliGRAM(s)/deciliter  glucagon  Injectable 1 milliGRAM(s) IntraMuscular once PRN Glucose LESS THAN 70 milligrams/deciliter          Assessment and Plan:     1. fall, possible syncope- likely due to orthostatic hypotension as well as severe AS  - trops trending down  - tele no events  - need orthostatic vitals- discussed w/ RN 7/30 not done.   Discussed 7/31- positive, start ivf and reassess.   - CT head no acute changes,  imaging no fractures  - no signs infection, ua neg, cxr no infiltrates , no fevers   - Cardio f/u appreciated (as per 2017 notes, hx AS.  uncertain when prior echo)-  severe AS, rec cath and then TAVR eval -  will discuss w/ family and outpt f/u   - PT eval appreciated- home w/ PT    2. dementia  mild, pt currently aaox3 and able to discuss most events of yesterday although very forgetful      3. uncontrolled diabetes  A1c 11.7 (was 9 in 2017).  ?no home meds listed  - needs to start lantus, monitor fs, diabetic diet, diabetic education for pt and   7/31- see chart note yesterday .  diabetic education today    4. dvt px    d/c planning if repeat orthostatic vitals negative

## 2020-07-31 NOTE — PROGRESS NOTE ADULT - SUBJECTIVE AND OBJECTIVE BOX
74 yo female with an unwitnessed fall at home. Possible syncope, but has many falls at home. Pt is a poor historian sec to dementia. Family is not available at this time. History from chart review and office records. Seen last month at the office after a long interim of about 3 years. She has severe AS. Had an echo that showed progression of AS. Has not had a follow up visit yet.  Dementia with pleasantly confused and forgetful status. She is able to ambulate     Today, no new events. Left message at pt's home number - awaiting phone call back from ,     PAST MEDICAL & SURGICAL HISTORY:  AS  CAD, [place] of LCx 10/20/16  LBBB (left bundle branch block)  Dementia  Cerebrovascular accident (CVA)  Breast cancer  DM (diabetes mellitus): type 2, not on meds  Carotid stent occlusion      PREVIOUS CARDIAC WORKUP:      Echo:  20 Nml LV EF, severe LVH, Severe AS. Mild MS, Mod TR    Allergies:   No Known Allergies      MEDICATIONS  (STANDING):  calcium carbonate   1250 mG (OsCal) 1 Tablet(s) Oral daily  cholecalciferol 2000 Unit(s) Oral daily  dextrose 50% Injectable 12.5 Gram(s) IV Push once  dextrose 50% Injectable 25 Gram(s) IV Push once  dextrose 50% Injectable 25 Gram(s) IV Push once  heparin   Injectable 5000 Unit(s) SubCutaneous every 8 hours  insulin glargine Injectable (LANTUS) 5 Unit(s) SubCutaneous at bedtime  insulin lispro (HumaLOG) corrective regimen sliding scale   SubCutaneous three times a day before meals  insulin lispro (HumaLOG) corrective regimen sliding scale   SubCutaneous at bedtime  nystatin Powder 1 Application(s) Topical three times a day    MEDICATIONS  (PRN):  dextrose 40% Gel 15 Gram(s) Oral once PRN Blood Glucose LESS THAN 70 milliGRAM(s)/deciliter  glucagon  Injectable 1 milliGRAM(s) IntraMuscular once PRN Glucose LESS THAN 70 milligrams/deciliter      ROS:   not reliable sec to dementia    Detailed ten system ROS was performed and was negative except for history as eluded to above.    no fever  no chills  no nausea  no vomiting  no diarrhea  no constipation  no melena  no hematochezia  no chest pain  no palpitations  no sob at rest  no dyspnea on exertion  no cough  no wheezing  no anorexia  no headache  no dizziness  no syncope  no weakness  no myalgia  no dysuria  no polyuria  no hematuria       Vital Signs Last 24 Hrs  T(C): 36.6 (2020 08:01), Max: 36.6 (2020 08:01)  T(F): 97.8 (2020 08:01), Max: 97.8 (2020 08:01)  HR: 62 (2020 08:01) (62 - 102)  BP: 126/83 (2020 08:01) (126/83 - 143/78)  RR: 18 (2020 08:01) (18 - 18)  SpO2: 97% (2020 08:01) (96% - 97%)    I&O's Summary    2020 07:01  -  2020 07:00  --------------------------------------------------------  IN: 450 mL / OUT: 600 mL / NET: -150 mL        PHYSICAL EXAM:    General:                Comfortable, AAO X 1, in no distress.   HEENT:                  Atraumatic, PERRLA, EOMI, conjunctiva clear.    Neck:                     Supple, no adenopathy, no thyromegaly, no JVD, no bruit.   Chest:                    Clear, B/L symmetric air entry, no tachypnea  Heart:                     S1, S2 normal, no gallop, + murmur.  Abdomen:              Soft, non-tender, bowel sounds active. No palpable masses.  Extremities:           no cyanosis, no edema. Peripheral pulses normal.  Skin:                      Skin color, texture normal. No rashes.  Neurologic:            Grossly nonfocal.       TELEMETRY:   Normal sinus rhythm with no tachy or layla events     ECG:   NSR, LBBB.       LABS:                        14.2   10.13 )-----------( 219      ( 2020 07:51 )             44.5     07-30    140  |  106  |  15  ----------------------------<  235<H>  4.0   |  28  |  0.64    Ca    9.0      2020 07:51  Phos  3.1     07-30  Mg     1.9     07-30    TPro  7.2  /  Alb  3.0<L>  /  TBili  0.7  /  DBili  x   /  AST  22  /  ALT  25  /  AlkPhos  79        Lipid Panel  Chl 155  HDL 47  LDL 89  Trg 94        07- @ 07:51  Trop-I 0.162  CK     59     @ 20:38  Trop-I 0.184     @ 17:19  Trop-I 0.217    Pro BNP  1507  @ 17:19    PT/INR - ( 2020 20:38 )   PT: 12.3 sec;   INR: 1.05 ratio         PTT - ( 2020 20:38 )  PTT:35.8 sec  Urinalysis Basic - ( 2020 17:19 )    Color: Yellow / Appearance: Clear / S.010 / pH: x  Gluc: x / Ketone: Negative  / Bili: Negative / Urobili: Negative mg/dL   Blood: x / Protein: Negative mg/dL / Nitrite: Negative   Leuk Esterase: Trace / RBC: 0-2 /HPF / WBC 0-2   Sq Epi: x / Non Sq Epi: Occasional / Bacteria: Occasional

## 2020-07-31 NOTE — PROGRESS NOTE ADULT - ASSESSMENT
Fall, ? Syncope  Severe AS  Elevated CE, currently chest pain free. ? Demand related vs NSTEMI  CAD by history  LBBB  Dementia    Suggest:    Awaiting phone call from . Will discuss with family reg TAVR. ? candidacy given dementia.  Conservative treatment otherwise.  High 6 months mortality rate if untreated severe AS chary in setting of syncope, ACS   Keep well hydrated.  Monitor on tele

## 2020-08-01 ENCOUNTER — TRANSCRIPTION ENCOUNTER (OUTPATIENT)
Age: 75
End: 2020-08-01

## 2020-08-01 VITALS — OXYGEN SATURATION: 98 % | RESPIRATION RATE: 18 BRPM

## 2020-08-01 LAB
ANION GAP SERPL CALC-SCNC: 4 MMOL/L — LOW (ref 5–17)
BUN SERPL-MCNC: 19 MG/DL — SIGNIFICANT CHANGE UP (ref 7–23)
CALCIUM SERPL-MCNC: 8.9 MG/DL — SIGNIFICANT CHANGE UP (ref 8.5–10.1)
CHLORIDE SERPL-SCNC: 106 MMOL/L — SIGNIFICANT CHANGE UP (ref 96–108)
CO2 SERPL-SCNC: 29 MMOL/L — SIGNIFICANT CHANGE UP (ref 22–31)
CREAT SERPL-MCNC: 0.73 MG/DL — SIGNIFICANT CHANGE UP (ref 0.5–1.3)
GLUCOSE SERPL-MCNC: 214 MG/DL — HIGH (ref 70–99)
HCT VFR BLD CALC: 41.4 % — SIGNIFICANT CHANGE UP (ref 34.5–45)
HGB BLD-MCNC: 13.1 G/DL — SIGNIFICANT CHANGE UP (ref 11.5–15.5)
MCHC RBC-ENTMCNC: 29 PG — SIGNIFICANT CHANGE UP (ref 27–34)
MCHC RBC-ENTMCNC: 31.6 GM/DL — LOW (ref 32–36)
MCV RBC AUTO: 91.8 FL — SIGNIFICANT CHANGE UP (ref 80–100)
PLATELET # BLD AUTO: 204 K/UL — SIGNIFICANT CHANGE UP (ref 150–400)
POTASSIUM SERPL-MCNC: 4.8 MMOL/L — SIGNIFICANT CHANGE UP (ref 3.5–5.3)
POTASSIUM SERPL-SCNC: 4.8 MMOL/L — SIGNIFICANT CHANGE UP (ref 3.5–5.3)
RBC # BLD: 4.51 M/UL — SIGNIFICANT CHANGE UP (ref 3.8–5.2)
RBC # FLD: 13.8 % — SIGNIFICANT CHANGE UP (ref 10.3–14.5)
SODIUM SERPL-SCNC: 139 MMOL/L — SIGNIFICANT CHANGE UP (ref 135–145)
WBC # BLD: 7.16 K/UL — SIGNIFICANT CHANGE UP (ref 3.8–10.5)
WBC # FLD AUTO: 7.16 K/UL — SIGNIFICANT CHANGE UP (ref 3.8–10.5)

## 2020-08-01 PROCEDURE — 99239 HOSP IP/OBS DSCHRG MGMT >30: CPT

## 2020-08-01 RX ORDER — MIDODRINE HYDROCHLORIDE 2.5 MG/1
1 TABLET ORAL
Qty: 90 | Refills: 0
Start: 2020-08-01 | End: 2020-08-30

## 2020-08-01 RX ORDER — MIDODRINE HYDROCHLORIDE 2.5 MG/1
2.5 TABLET ORAL THREE TIMES A DAY
Refills: 0 | Status: DISCONTINUED | OUTPATIENT
Start: 2020-08-01 | End: 2020-08-01

## 2020-08-01 RX ADMIN — Medication 1 TABLET(S): at 10:46

## 2020-08-01 RX ADMIN — Medication 3: at 12:08

## 2020-08-01 RX ADMIN — NYSTATIN CREAM 1 APPLICATION(S): 100000 CREAM TOPICAL at 06:13

## 2020-08-01 RX ADMIN — Medication 2000 UNIT(S): at 10:46

## 2020-08-01 RX ADMIN — HEPARIN SODIUM 5000 UNIT(S): 5000 INJECTION INTRAVENOUS; SUBCUTANEOUS at 06:13

## 2020-08-01 RX ADMIN — Medication 3: at 08:04

## 2020-08-01 NOTE — PROGRESS NOTE ADULT - SUBJECTIVE AND OBJECTIVE BOX
cc: fall  hpi: 75y female w/ pmh dementia, cva, CAD s/p stent 2016, hx AS  (as per 2017 Cardio notes) presents w/ unwitnessed fall at home.  pt states she was walking in kitchen and next thing she recalls is being found on floor by .  Denies hitting head, no preceding dizziness, cp, sob, palp.   States she wasn't getting up from seated to standing position.   Feels well today- no complaints.      7/31- no complaints.  discussed severe AS w/ pt,  and Cardio- pt refuses further eval;  states he wants pt home and then will discuss further w/ family and follow up outpatient.  Discussed positive orthostatic vitals and ivf this morning- repeat vitals in afternoon prior to possible d/c home.   will still come at 3pm either way for diabetic education.      8/1- d/c home pending repeat orthostatics    ros- as per hpi, other 10 point ros negative    Vital Signs Last 24 Hrs  T(C): 36.4 (31 Jul 2020 20:40), Max: 36.4 (31 Jul 2020 20:40)  T(F): 97.5 (31 Jul 2020 20:40), Max: 97.5 (31 Jul 2020 20:40)  HR: 69 (31 Jul 2020 20:40) (69 - 69)  BP: 111/64 (31 Jul 2020 20:40) (111/64 - 111/64)  BP(mean): --  RR: 18 (31 Jul 2020 20:40) (18 - 18)  SpO2: 97% (31 Jul 2020 20:40) (97% - 97%)  T(C): 36.6 (31 Jul 2020 08:01), Max: 36.6 (31 Jul 2020 08:01)    PHYSICAL EXAM:  General: NAD, comfortable-  Neuro: AAOx3, no focal deficits; very forgetful  HEENT: NCAT, EOMI  Neck: Soft and supple  Respiratory: CTA b/l, no w/r/r  Cardiovascular: S1 and S2, RRR,  EDOUARD  Gastrointestinal: non ttp , soft  Extremities: No c/c/e  Vascular: 2+ peripheral pulses  Musculoskeletal: 5/5 strength b/l UE and LE, sensation intact        LABS: All Labs Reviewed:                         14.2   10.13 )-----------( 219      ( 30 Jul 2020 07:51 )               44.5     07-29    140  |  107  |  11  ----------------------------<  225<H>  4.1   |  28  |  0.70    Ca    9.1      29 Jul 2020 17:19    TPro  7.2  /  Alb  3.0<L>  /  TBili  0.7  /  DBili  x   /  AST  22  /  ALT  25  /  AlkPhos  79  07-29    PT/INR - ( 29 Jul 2020 20:38 )   PT: 12.3 sec;   INR: 1.05 ratio         PTT - ( 29 Jul 2020 20:38 )  PTT:35.8 sec  CARDIAC MARKERS ( 29 Jul 2020 20:38 )  0.184 ng/mL / x     / x     / x     / x      CARDIAC MARKERS ( 29 Jul 2020 17:19 )  0.217 ng/mL / x     / x     / x     / x            < from: CT Cervical Spine No Cont (07.29.20 @ 18:29) >  IMPRESSION:  No acute fracture identified. Underlying degenerative changes throughout the mid and lower cervical region but without significant central stenosis.    < end of copied text >    < from: CT Head No Cont (07.29.20 @ 18:29) >  IMPRESSION:  1)  involutional changes and volume loss. Scattered chronic ischemic changes in the posterior fossa. Similar findings noted previously. No acute abnormality suggested..  2)  no intracerebral hemorrhage or contusion is identified.    < end of copied text >      MEDICATIONS  (STANDING):  calcium carbonate   1250 mG (OsCal) 1 Tablet(s) Oral daily  cholecalciferol 2000 Unit(s) Oral daily  dextrose 50% Injectable 12.5 Gram(s) IV Push once  dextrose 50% Injectable 25 Gram(s) IV Push once  dextrose 50% Injectable 25 Gram(s) IV Push once  heparin   Injectable 5000 Unit(s) SubCutaneous every 8 hours  insulin glargine Injectable (LANTUS) 5 Unit(s) SubCutaneous at bedtime  insulin lispro (HumaLOG) corrective regimen sliding scale   SubCutaneous three times a day before meals  insulin lispro (HumaLOG) corrective regimen sliding scale   SubCutaneous at bedtime  nystatin Powder 1 Application(s) Topical three times a day  sodium chloride 0.9%. 1000 milliLiter(s) (75 mL/Hr) IV Continuous <Continuous>    MEDICATIONS  (PRN):  dextrose 40% Gel 15 Gram(s) Oral once PRN Blood Glucose LESS THAN 70 milliGRAM(s)/deciliter  glucagon  Injectable 1 milliGRAM(s) IntraMuscular once PRN Glucose LESS THAN 70 milligrams/deciliter          Assessment and Plan:     1. fall, possible syncope- likely due to orthostatic hypotension as well as severe AS  - trops trending down  - tele no events  - need orthostatic vitals- discussed w/ RN 7/30 not done.   Discussed 7/31- positive, start ivf and reassess.   - CT head no acute changes,  imaging no fractures  - no signs infection, ua neg, cxr no infiltrates , no fevers   - Cardio f/u appreciated (as per 2017 notes, hx AS.  uncertain when prior echo)-  severe AS, rec cath and then TAVR eval -  will discuss w/ family and outpt f/u   - PT eval appreciated- home w/ PT    2. dementia  mild, pt currently aaox3 and able to discuss most events of yesterday although very forgetful  no home meds      3. uncontrolled diabetes  A1c 11.7 (was 9 in 2017).  ?no home meds listed  - needs to start lantus, monitor fs, diabetic diet, diabetic education for pt and   7/31- see chart note yesterday .  diabetic education today  8/1-  refusing insulin.  agrees to po  meds and close outpt f/u.  home care    4. dvt px    d/c planning if repeat orthostatic vitals negative cc: fall  hpi: 75y female w/ pmh dementia, cva, CAD s/p stent 2016, hx AS  (as per 2017 Cardio notes) presents w/ unwitnessed fall at home.  pt states she was walking in kitchen and next thing she recalls is being found on floor by .  Denies hitting head, no preceding dizziness, cp, sob, palp.   States she wasn't getting up from seated to standing position.   Feels well today- no complaints.      7/31- no complaints.  discussed severe AS w/ pt,  and Cardio- pt refuses further eval;  states he wants pt home and then will discuss further w/ family and follow up outpatient.  Discussed positive orthostatic vitals and ivf this morning- repeat vitals in afternoon prior to possible d/c home.   will still come at 3pm either way for diabetic education.      8/1- d/c home pending repeat orthostatics- slightly improved bp.  pt eager to go home.  starting low dose midodrine.     ros- as per hpi, other 10 point ros negative    Vital Signs Last 24 Hrs  T(C): 36.4 (31 Jul 2020 20:40), Max: 36.4 (31 Jul 2020 20:40)  T(F): 97.5 (31 Jul 2020 20:40), Max: 97.5 (31 Jul 2020 20:40)  HR: 69 (31 Jul 2020 20:40) (69 - 69)  BP: 111/64 (31 Jul 2020 20:40) (111/64 - 111/64)  BP(mean): --  RR: 18 (31 Jul 2020 20:40) (18 - 18)  SpO2: 97% (31 Jul 2020 20:40) (97% - 97%)  T(C): 36.6 (31 Jul 2020 08:01), Max: 36.6 (31 Jul 2020 08:01)    PHYSICAL EXAM:  General: NAD, comfortable-  Neuro: AAOx3, no focal deficits; very forgetful  HEENT: NCAT, EOMI  Neck: Soft and supple  Respiratory: CTA b/l, no w/r/r  Cardiovascular: S1 and S2, RRR,  EDOUARD  Gastrointestinal: non ttp , soft  Extremities: No c/c/e  Vascular: 2+ peripheral pulses  Musculoskeletal: 5/5 strength b/l UE and LE, sensation intact          LABS: All Labs Reviewed:                        13.1   7.16  )-----------( 204      ( 01 Aug 2020 06:04 )             41.4     08-01    139  |  106  |  19  ----------------------------<  214<H>  4.8   |  29  |  0.73    Ca    8.9      01 Aug 2020 06:04                < from: CT Cervical Spine No Cont (07.29.20 @ 18:29) >  IMPRESSION:  No acute fracture identified. Underlying degenerative changes throughout the mid and lower cervical region but without significant central stenosis.    < end of copied text >    < from: CT Head No Cont (07.29.20 @ 18:29) >  IMPRESSION:  1)  involutional changes and volume loss. Scattered chronic ischemic changes in the posterior fossa. Similar findings noted previously. No acute abnormality suggested..  2)  no intracerebral hemorrhage or contusion is identified.    < end of copied text >      MEDICATIONS  (STANDING):  calcium carbonate   1250 mG (OsCal) 1 Tablet(s) Oral daily  cholecalciferol 2000 Unit(s) Oral daily  dextrose 50% Injectable 12.5 Gram(s) IV Push once  dextrose 50% Injectable 25 Gram(s) IV Push once  dextrose 50% Injectable 25 Gram(s) IV Push once  heparin   Injectable 5000 Unit(s) SubCutaneous every 8 hours  insulin glargine Injectable (LANTUS) 5 Unit(s) SubCutaneous at bedtime  insulin lispro (HumaLOG) corrective regimen sliding scale   SubCutaneous three times a day before meals  insulin lispro (HumaLOG) corrective regimen sliding scale   SubCutaneous at bedtime  nystatin Powder 1 Application(s) Topical three times a day  sodium chloride 0.9%. 1000 milliLiter(s) (75 mL/Hr) IV Continuous <Continuous>    MEDICATIONS  (PRN):  dextrose 40% Gel 15 Gram(s) Oral once PRN Blood Glucose LESS THAN 70 milliGRAM(s)/deciliter  glucagon  Injectable 1 milliGRAM(s) IntraMuscular once PRN Glucose LESS THAN 70 milligrams/deciliter          Assessment and Plan:     1. fall, possible syncope- likely due to orthostatic hypotension as well as severe AS  - trops trending down  - tele no events  - need orthostatic vitals- discussed w/ RN 7/30 not done.   Discussed 7/31- positive, start ivf and reassess.   - CT head no acute changes,  imaging no fractures  - no signs infection, ua neg, cxr no infiltrates , no fevers   - Cardio f/u appreciated (as per 2017 notes, hx AS.  uncertain when prior echo)-  severe AS, rec cath and then TAVR eval -  will discuss w/ family and outpt f/u   - PT eval appreciated- home w/ PT  8/1- bp improving.  low dose midodrine    2. dementia  mild, pt currently aaox3 and able to discuss most events of yesterday although very forgetful  no home meds      3. uncontrolled diabetes  A1c 11.7 (was 9 in 2017).  ?no home meds listed  - needs to start lantus, monitor fs, diabetic diet, diabetic education for pt and   7/31- see chart note yesterday .  diabetic education today  8/1-  refusing insulin.  agrees to po  meds and close outpt f/u.  home care    4. dvt px    stable for d/c home w/  who agrees to assist w/ all meds; home care to be set up.  time 55min.

## 2020-08-01 NOTE — DISCHARGE NOTE PROVIDER - HOSPITAL COURSE
Assessment and Plan:         1. fall, possible syncope- likely due to orthostatic hypotension as well as severe AS    - trops trending down    - tele no events    - need orthostatic vitals- discussed w/ RN 7/30 not done.   Discussed 7/31- positive, start ivf and reassess.     - CT head no acute changes,  imaging no fractures    - no signs infection, ua neg, cxr no infiltrates , no fevers     - Cardio f/u appreciated (as per 2017 notes, hx AS.  uncertain when prior echo)-  severe AS, rec cath and then TAVR eval -  will discuss w/ family and outpt f/u     - PT eval appreciated- home w/ PT    8/1- bp improving.  low dose midodrine        2. dementia    mild, pt currently aaox3 and able to discuss most events of yesterday although very forgetful    no home meds            3. uncontrolled diabetes    A1c 11.7 (was 9 in 2017).  ?no home meds listed    - needs to start lantus, monitor fs, diabetic diet, diabetic education for pt and     7/31- see chart note yesterday .  diabetic education today    8/1-  refusing insulin.  agrees to po  meds and close outpt f/u.  home care        4. dvt px        stable for d/c home w/  who agrees to assist w/ all meds; home care to be set up.  time 55min.

## 2020-08-01 NOTE — PROGRESS NOTE ADULT - REASON FOR ADMISSION
unwitnessed fall with elevated troponin

## 2020-08-01 NOTE — DISCHARGE NOTE PROVIDER - CARE PROVIDER_API CALL
Sylvester Parada  CARDIOLOGY  [address] [facility] [place] [address]  Phone: [phone]  Fax: [phone]  Follow Up Time:     Rolando Odom  ENDOCRINOLOGY/METAB/DIABETES  [address] [place] [address]  Phone: [phone]  Fax: [phone]  Follow Up Time:

## 2020-08-01 NOTE — DISCHARGE NOTE PROVIDER - NSDCMRMEDTOKEN_GEN_ALL_CORE_FT
alcohol swabs : Apply topically to affected area 3 times a day MDD:E11.9  calcium carbonate 1000 mg oral tablet, chewable: 1 tab(s) orally once a day  glucometer (per patient&#x27;s insurance): 1 application subcutaneous 3 times a day MDD:E11.9  Glucophage 500 mg oral tablet: 1 tab(s) orally 2 times a day   insulin glargine: 10 unit(s)  Insulin Pen Needles, 4mm: 1 application subcutaneously 3 times a day . ** Use with insulin pen ** MDD:E11.9  lancets: 1 application subcutaneously 3 times a day MDD:E11.9  Lantus Solostar Pen 100 units/mL subcutaneous solution: 10 unit(s) subcutaneous once a day (at bedtime) MDD:E11.9  midodrine 2.5 mg oral tablet: 1 tab(s) orally 3 times a day  test strips (per patient&#x27;s insurance): 1 application subcutaneously 3 times a day . ** Compatible with patient&#x27;s glucometer ** MDD:E11.9  Vitamin D3 2000 intl units (50 mcg) oral tablet: 1 tab(s) orally once a day

## 2020-08-01 NOTE — DISCHARGE NOTE PROVIDER - NSDCCPCAREPLAN_GEN_ALL_CORE_FT
PRINCIPAL DISCHARGE DIAGNOSIS  Diagnosis: Syncope and collapse  Assessment and Plan of Treatment: due to orthostatic hypotension and severe aortic stenosis.  - You were seen by Cardiology and you and  prefer to go home and discuss plan further with family.  Follow up with Dr. Parada in 1 week.    - Stay hydrated.   - Rise slowly from seated to standing position.     - take low dose midodrine three times a day to help blood pressure (sent to pharmacy).      SECONDARY DISCHARGE DIAGNOSES  Diagnosis: Uncontrolled diabetes mellitus  Assessment and Plan of Treatment: As discussed at length, we recommend you take lantus injections every evening as well as metformin twice daily.   Since you only agree to take metformin for now, please follow up with your primary doctor within 1 week for further eval, labs and recommendations.    - strict diabetic diet  - metformin 500mg twice daily

## 2020-08-06 DIAGNOSIS — I95.1 ORTHOSTATIC HYPOTENSION: ICD-10-CM

## 2020-08-06 DIAGNOSIS — Z91.81 HISTORY OF FALLING: ICD-10-CM

## 2020-08-06 DIAGNOSIS — F03.90 UNSPECIFIED DEMENTIA WITHOUT BEHAVIORAL DISTURBANCE: ICD-10-CM

## 2020-08-06 DIAGNOSIS — I44.7 LEFT BUNDLE-BRANCH BLOCK, UNSPECIFIED: ICD-10-CM

## 2020-08-06 DIAGNOSIS — Z86.73 PERSONAL HISTORY OF TRANSIENT ISCHEMIC ATTACK (TIA), AND CEREBRAL INFARCTION WITHOUT RESIDUAL DEFICITS: ICD-10-CM

## 2020-08-06 DIAGNOSIS — I25.10 ATHEROSCLEROTIC HEART DISEASE OF NATIVE CORONARY ARTERY WITHOUT ANGINA PECTORIS: ICD-10-CM

## 2020-08-06 DIAGNOSIS — Z85.3 PERSONAL HISTORY OF MALIGNANT NEOPLASM OF BREAST: ICD-10-CM

## 2020-08-06 DIAGNOSIS — Z95.5 PRESENCE OF CORONARY ANGIOPLASTY IMPLANT AND GRAFT: ICD-10-CM

## 2020-08-06 DIAGNOSIS — E11.65 TYPE 2 DIABETES MELLITUS WITH HYPERGLYCEMIA: ICD-10-CM

## 2020-08-06 DIAGNOSIS — Z87.891 PERSONAL HISTORY OF NICOTINE DEPENDENCE: ICD-10-CM

## 2020-08-06 DIAGNOSIS — I35.0 NONRHEUMATIC AORTIC (VALVE) STENOSIS: ICD-10-CM

## 2020-08-06 DIAGNOSIS — B36.8 OTHER SPECIFIED SUPERFICIAL MYCOSES: ICD-10-CM

## 2020-08-06 DIAGNOSIS — I10 ESSENTIAL (PRIMARY) HYPERTENSION: ICD-10-CM

## 2020-09-10 NOTE — ED ADULT NURSE NOTE - PERIPHERAL VASCULAR
[id number] STAGE:  0 right breast cancer  grade 3+ ER +     Phoebe Stephen is a 61 y.o.  woman with history of  right breast cancer. She is here for 8 month follow up. Her treatment included partial mastectomy and whole breast irradiation. She has been on letrozole for 6 months. BIRADS 3 imaging today, 6 month f/u on right recommended. She has no complaints today, tolerating letrozole. Spent time in [place] and in [place], visiting new grandbaby. She lost her mom in May, to dementia. No covid exposures. UTD on all her screening exams. Review of Systems   Constitutional: Negative for appetite change, fatigue and unexpected weight change. Respiratory: Negative for cough and shortness of breath. Cardiovascular: Negative for chest pain and leg swelling. Gastrointestinal: Negative for abdominal distention and abdominal pain. Genitourinary: Negative for difficulty urinating and dysuria. Musculoskeletal: Negative for arthralgias and back pain. Skin: Negative for rash and wound. Allergic/Immunologic: Negative for environmental allergies and food allergies. Neurological: Negative for dizziness and headaches. Hematological: Negative for adenopathy. Does not bruise/bleed easily. Psychiatric/Behavioral: Negative for dysphoric mood. The patient is not nervous/anxious. Exam:  /70 (Site: Right Upper Arm, Position: Sitting, Cuff Size: Medium Adult)   Pulse 68   Temp 98.1 °F (36.7 °C) (Temporal)   Resp 16   Ht 5' 7.01\" (1.702 m)   Wt 139 lb (63 kg)   BMI 21.77 kg/m²   General: Well appearing, no acute distress, extra-ocular movements intact, anicteric  Breasts/Chest wall: There is a well healed scar on the right henna-areolar breast . There is a similarly well healed ipsilateral axillary scar. There are expected  post surgical and radiation related changes. She has excellent range of motion with her right shoulder.   The contralateral breast has no new masses, skin WDL

## 2021-01-18 ENCOUNTER — EMERGENCY (EMERGENCY)
Facility: HOSPITAL | Age: 76
LOS: 0 days | Discharge: HOME | End: 2021-01-18
Attending: EMERGENCY MEDICINE
Payer: MEDICARE

## 2021-01-18 VITALS
OXYGEN SATURATION: 96 % | RESPIRATION RATE: 20 BRPM | TEMPERATURE: 98 F | HEIGHT: 72 IN | SYSTOLIC BLOOD PRESSURE: 150 MMHG | HEART RATE: 84 BPM | DIASTOLIC BLOOD PRESSURE: 77 MMHG | WEIGHT: 164.91 LBS

## 2021-01-18 VITALS
RESPIRATION RATE: 20 BRPM | DIASTOLIC BLOOD PRESSURE: 66 MMHG | OXYGEN SATURATION: 100 % | TEMPERATURE: 98 F | HEART RATE: 67 BPM | SYSTOLIC BLOOD PRESSURE: 136 MMHG

## 2021-01-18 DIAGNOSIS — R42 DIZZINESS AND GIDDINESS: ICD-10-CM

## 2021-01-18 DIAGNOSIS — Z86.73 PERSONAL HISTORY OF TRANSIENT ISCHEMIC ATTACK (TIA), AND CEREBRAL INFARCTION WITHOUT RESIDUAL DEFICITS: ICD-10-CM

## 2021-01-18 DIAGNOSIS — N39.0 URINARY TRACT INFECTION, SITE NOT SPECIFIED: ICD-10-CM

## 2021-01-18 DIAGNOSIS — T82.898A OTHER SPECIFIED COMPLICATION OF VASCULAR PROSTHETIC DEVICES, IMPLANTS AND GRAFTS, INITIAL ENCOUNTER: Chronic | ICD-10-CM

## 2021-01-18 DIAGNOSIS — F03.90 UNSPECIFIED DEMENTIA, UNSPECIFIED SEVERITY, WITHOUT BEHAVIORAL DISTURBANCE, PSYCHOTIC DISTURBANCE, MOOD DISTURBANCE, AND ANXIETY: ICD-10-CM

## 2021-01-18 DIAGNOSIS — Z85.3 PERSONAL HISTORY OF MALIGNANT NEOPLASM OF BREAST: ICD-10-CM

## 2021-01-18 DIAGNOSIS — Z20.822 CONTACT WITH AND (SUSPECTED) EXPOSURE TO COVID-19: ICD-10-CM

## 2021-01-18 DIAGNOSIS — I44.7 LEFT BUNDLE-BRANCH BLOCK, UNSPECIFIED: ICD-10-CM

## 2021-01-18 DIAGNOSIS — Z79.4 LONG TERM (CURRENT) USE OF INSULIN: ICD-10-CM

## 2021-01-18 DIAGNOSIS — E11.9 TYPE 2 DIABETES MELLITUS WITHOUT COMPLICATIONS: ICD-10-CM

## 2021-01-18 PROBLEM — I63.9 CEREBRAL INFARCTION, UNSPECIFIED: Chronic | Status: ACTIVE | Noted: 2020-07-30

## 2021-01-18 LAB
ALBUMIN SERPL ELPH-MCNC: 3.1 G/DL — LOW (ref 3.3–5)
ALP SERPL-CCNC: 76 U/L — SIGNIFICANT CHANGE UP (ref 40–120)
ALT FLD-CCNC: 28 U/L — SIGNIFICANT CHANGE UP (ref 12–78)
ANION GAP SERPL CALC-SCNC: 5 MMOL/L — SIGNIFICANT CHANGE UP (ref 5–17)
APPEARANCE UR: ABNORMAL
APTT BLD: 31.9 SEC — SIGNIFICANT CHANGE UP (ref 27.5–35.5)
AST SERPL-CCNC: 19 U/L — SIGNIFICANT CHANGE UP (ref 15–37)
BASOPHILS # BLD AUTO: 0.04 K/UL — SIGNIFICANT CHANGE UP (ref 0–0.2)
BASOPHILS NFR BLD AUTO: 0.6 % — SIGNIFICANT CHANGE UP (ref 0–2)
BILIRUB SERPL-MCNC: 0.5 MG/DL — SIGNIFICANT CHANGE UP (ref 0.2–1.2)
BILIRUB UR-MCNC: NEGATIVE — SIGNIFICANT CHANGE UP
BUN SERPL-MCNC: 26 MG/DL — HIGH (ref 7–23)
CALCIUM SERPL-MCNC: 9.5 MG/DL — SIGNIFICANT CHANGE UP (ref 8.5–10.1)
CHLORIDE SERPL-SCNC: 105 MMOL/L — SIGNIFICANT CHANGE UP (ref 96–108)
CO2 SERPL-SCNC: 29 MMOL/L — SIGNIFICANT CHANGE UP (ref 22–31)
COLOR SPEC: YELLOW — SIGNIFICANT CHANGE UP
CREAT SERPL-MCNC: 0.74 MG/DL — SIGNIFICANT CHANGE UP (ref 0.5–1.3)
DIFF PNL FLD: NEGATIVE — SIGNIFICANT CHANGE UP
EOSINOPHIL # BLD AUTO: 0.19 K/UL — SIGNIFICANT CHANGE UP (ref 0–0.5)
EOSINOPHIL NFR BLD AUTO: 2.7 % — SIGNIFICANT CHANGE UP (ref 0–6)
GLUCOSE SERPL-MCNC: 367 MG/DL — HIGH (ref 70–99)
GLUCOSE UR QL: 1000 MG/DL
HCT VFR BLD CALC: 43.6 % — SIGNIFICANT CHANGE UP (ref 34.5–45)
HGB BLD-MCNC: 14.4 G/DL — SIGNIFICANT CHANGE UP (ref 11.5–15.5)
IMM GRANULOCYTES NFR BLD AUTO: 0.1 % — SIGNIFICANT CHANGE UP (ref 0–1.5)
INR BLD: 1.08 RATIO — SIGNIFICANT CHANGE UP (ref 0.88–1.16)
KETONES UR-MCNC: NEGATIVE — SIGNIFICANT CHANGE UP
LEUKOCYTE ESTERASE UR-ACNC: ABNORMAL
LYMPHOCYTES # BLD AUTO: 1.35 K/UL — SIGNIFICANT CHANGE UP (ref 1–3.3)
LYMPHOCYTES # BLD AUTO: 19 % — SIGNIFICANT CHANGE UP (ref 13–44)
MCHC RBC-ENTMCNC: 30.6 PG — SIGNIFICANT CHANGE UP (ref 27–34)
MCHC RBC-ENTMCNC: 33 GM/DL — SIGNIFICANT CHANGE UP (ref 32–36)
MCV RBC AUTO: 92.8 FL — SIGNIFICANT CHANGE UP (ref 80–100)
MONOCYTES # BLD AUTO: 0.56 K/UL — SIGNIFICANT CHANGE UP (ref 0–0.9)
MONOCYTES NFR BLD AUTO: 7.9 % — SIGNIFICANT CHANGE UP (ref 2–14)
NEUTROPHILS # BLD AUTO: 4.94 K/UL — SIGNIFICANT CHANGE UP (ref 1.8–7.4)
NEUTROPHILS NFR BLD AUTO: 69.7 % — SIGNIFICANT CHANGE UP (ref 43–77)
NITRITE UR-MCNC: NEGATIVE — SIGNIFICANT CHANGE UP
PH UR: 8 — SIGNIFICANT CHANGE UP (ref 5–8)
PLATELET # BLD AUTO: 216 K/UL — SIGNIFICANT CHANGE UP (ref 150–400)
POTASSIUM SERPL-MCNC: 4.2 MMOL/L — SIGNIFICANT CHANGE UP (ref 3.5–5.3)
POTASSIUM SERPL-SCNC: 4.2 MMOL/L — SIGNIFICANT CHANGE UP (ref 3.5–5.3)
PROT SERPL-MCNC: 7.1 GM/DL — SIGNIFICANT CHANGE UP (ref 6–8.3)
PROT UR-MCNC: NEGATIVE MG/DL — SIGNIFICANT CHANGE UP
PROTHROM AB SERPL-ACNC: 12.5 SEC — SIGNIFICANT CHANGE UP (ref 10.6–13.6)
RAPID RVP RESULT: SIGNIFICANT CHANGE UP
RBC # BLD: 4.7 M/UL — SIGNIFICANT CHANGE UP (ref 3.8–5.2)
RBC # FLD: 12.3 % — SIGNIFICANT CHANGE UP (ref 10.3–14.5)
SARS-COV-2 RNA SPEC QL NAA+PROBE: SIGNIFICANT CHANGE UP
SODIUM SERPL-SCNC: 139 MMOL/L — SIGNIFICANT CHANGE UP (ref 135–145)
SP GR SPEC: 1.01 — SIGNIFICANT CHANGE UP (ref 1.01–1.02)
TROPONIN I SERPL-MCNC: <0.015 NG/ML — SIGNIFICANT CHANGE UP (ref 0.01–0.04)
UROBILINOGEN FLD QL: NEGATIVE MG/DL — SIGNIFICANT CHANGE UP
WBC # BLD: 7.09 K/UL — SIGNIFICANT CHANGE UP (ref 3.8–10.5)
WBC # FLD AUTO: 7.09 K/UL — SIGNIFICANT CHANGE UP (ref 3.8–10.5)

## 2021-01-18 PROCEDURE — 70450 CT HEAD/BRAIN W/O DYE: CPT

## 2021-01-18 PROCEDURE — 71045 X-RAY EXAM CHEST 1 VIEW: CPT | Mod: 26

## 2021-01-18 PROCEDURE — 87186 SC STD MICRODIL/AGAR DIL: CPT

## 2021-01-18 PROCEDURE — 70450 CT HEAD/BRAIN W/O DYE: CPT | Mod: 26

## 2021-01-18 PROCEDURE — 93010 ELECTROCARDIOGRAM REPORT: CPT

## 2021-01-18 PROCEDURE — 85025 COMPLETE CBC W/AUTO DIFF WBC: CPT

## 2021-01-18 PROCEDURE — 87086 URINE CULTURE/COLONY COUNT: CPT

## 2021-01-18 PROCEDURE — 93005 ELECTROCARDIOGRAM TRACING: CPT

## 2021-01-18 PROCEDURE — 85730 THROMBOPLASTIN TIME PARTIAL: CPT

## 2021-01-18 PROCEDURE — 84484 ASSAY OF TROPONIN QUANT: CPT

## 2021-01-18 PROCEDURE — 80053 COMPREHEN METABOLIC PANEL: CPT

## 2021-01-18 PROCEDURE — 81001 URINALYSIS AUTO W/SCOPE: CPT

## 2021-01-18 PROCEDURE — 36415 COLL VENOUS BLD VENIPUNCTURE: CPT

## 2021-01-18 PROCEDURE — 71045 X-RAY EXAM CHEST 1 VIEW: CPT

## 2021-01-18 PROCEDURE — 85610 PROTHROMBIN TIME: CPT

## 2021-01-18 PROCEDURE — 0225U NFCT DS DNA&RNA 21 SARSCOV2: CPT

## 2021-01-18 PROCEDURE — 99284 EMERGENCY DEPT VISIT MOD MDM: CPT | Mod: 25

## 2021-01-18 PROCEDURE — 99283 EMERGENCY DEPT VISIT LOW MDM: CPT

## 2021-01-18 RX ORDER — CEFUROXIME AXETIL 250 MG
1 TABLET ORAL
Qty: 14 | Refills: 0
Start: 2021-01-18 | End: 2021-01-24

## 2021-01-18 RX ORDER — CEFUROXIME AXETIL 250 MG
500 TABLET ORAL ONCE
Refills: 0 | Status: COMPLETED | OUTPATIENT
Start: 2021-01-18 | End: 2021-01-18

## 2021-01-18 RX ORDER — SODIUM CHLORIDE 9 MG/ML
500 INJECTION INTRAMUSCULAR; INTRAVENOUS; SUBCUTANEOUS ONCE
Refills: 0 | Status: COMPLETED | OUTPATIENT
Start: 2021-01-18 | End: 2021-01-18

## 2021-01-18 RX ADMIN — SODIUM CHLORIDE 500 MILLILITER(S): 9 INJECTION INTRAMUSCULAR; INTRAVENOUS; SUBCUTANEOUS at 12:06

## 2021-01-18 NOTE — ED PROVIDER NOTE - OBJECTIVE STATEMENT
76 y/o female with PMHx of Dementia, LBBB, s/p CVA, Breast CA, presents to the ED BIBEMS s/p reported fall to ground. Per EMS, pt felt dizzy at home and then fell to ground. Denies head trauma, LOC. Pt is a poor historian 2/2 dementia, states she did fall but unsure of time. Denies pain or dizziness, n/v.

## 2021-01-18 NOTE — ED PROVIDER NOTE - MUSCULOSKELETAL, MLM
Spine appears normal, range of motion is not limited, no muscle or joint tenderness. MAEx4, Bilateral SLR 40 degrees with normal motor.

## 2021-01-18 NOTE — ED ADULT NURSE NOTE - HIV OFFER
Previously Declined (within the last year) Opt out Unable to answer due to medical condition/unresponsive/etc...

## 2021-01-18 NOTE — ED ADULT TRIAGE NOTE - CHIEF COMPLAINT QUOTE
PT FELT DIZZY AT HOME, FELL TO THE GROUND. DENIES HITTING HER HEAD. DENIES LOC. FORGETFUL TO TIME, BASELINE AS PER EMS AND FAMILY. DENIES PAIN. C/O DIZZINESS.

## 2021-01-18 NOTE — ED PROVIDER NOTE - CLINICAL SUMMARY MEDICAL DECISION MAKING FREE TEXT BOX
76 y/o female with PMHx of Dementia, LBBB, s/p CVA, Breast CA BIBEMS from c/o dizziness, which pt denies during ED evaluation, unsure why she's here. No active complaints, unsure if she fell last night. Abd benign. Neuro exam non-focal, given history of dementia. PLAN: EKG, CXR, CT head, Labs including urine, cautious IV fluids, fall precautions, monitor, observe, reassess. Review prior records.

## 2021-01-18 NOTE — ED ADULT NURSE NOTE - OBJECTIVE STATEMENT
Patient brought in by ambulance for syncopal episode at home. Patient alert and confused which son states is baseline. No visible injury noted. NSR on monitor.

## 2021-01-18 NOTE — ED PROVIDER NOTE - GASTROINTESTINAL NEGATIVE STATEMENT, MLM
Leda Warren is a 74 year old male here for  No chief complaint on file.    Denies latex allergy or sensitivity.    Medication verified and med list updated.  PCP and Pharmacy verified.    Social History     Tobacco Use   Smoking Status Never Smoker   Smokeless Tobacco Never Used   Tobacco Comment    2nd hand exposure     Advance Directives Filed: No    ECOG:   ECOG   ECOG Performance Status        Height: Yes, shoes on.  Ht Readings from Last 1 Encounters:   12/18/20 5' 8.11\" (1.73 m)     Weight:Yes, shoes on.  Wt Readings from Last 3 Encounters:   12/18/20 97.6 kg   12/18/20 97.6 kg   12/16/20 95.5 kg       BMI: There is no height or weight on file to calculate BMI.    REVIEW OF SYSTEMS  GENERAL:  Patient denies headache, fevers, chills, night sweats, change in appetite, weight loss, dizziness, but complains of: excessive fatigue  ALLERGIC/IMMUNOLOGIC: Verified allergies: Yes  EYES:  Patient denies significant visual difficulties, double vision, blurred vision  ENT/MOUTH: Patient denies problems with hearing, sore throat, sinus drainage, mouth sores  ENDOCRINE:  Patient denies diabetes, thyroid disease, hormone replacement, hot flashes  HEMATOLOGIC/LYMPHATIC: Patient denies easy bruising, bleeding, tender lymph nodes, swollen lymph nodes  BREASTS: Patient denies not reviewed at this time  RESPIRATORY:  Patient denies lung pain with breathing, cough, coughing up blood, shortness of breath  CARDIOVASCULAR:  Patient denies anginal chest pain, palpitations, shortness of breath when lying flat, peripheral edema  GASTROINTESTINAL: Patient denies abdominal pain , nausea, vomiting, GI bleeding, constipation, change in bowel habits, heartburn, sensation of feeling full, difficulty swallowing, but complains of: diarrhea  : Patient denies blood in the urine, burning with urination, frequency, urgency, hesitancy, incontinence  MUSCULOSKELETAL:  Patient denies joint pain, bone pain, joint swelling, redness, decreased range  of motion, but complains of: back pain ,   SKIN:  Patient denies chronic rashes, inflammation, ulcerations, skin changes, itching  NEUROLOGIC:  Patient denies loss of balance, areas of focal weakness, abnormal gait, sensory problems, numbness, tingling  PSYCHIATRIC: Patient denies insomnia, depression, anxiety     no abdominal pain, no bloating, no constipation, no diarrhea, no nausea and no vomiting.

## 2021-01-18 NOTE — ED PROVIDER NOTE - WET READ LAUNCH
nicolasi only--    Pt was seen 5/1/18. She wanted to thank dr Kvng Mahmood. States she's 200% better since the injection was administered.
[General Appearance - Well Developed] : well developed
[Normal Appearance] : normal appearance
[Well Groomed] : well groomed
[General Appearance - Well Nourished] : well nourished
[No Deformities] : no deformities
[General Appearance - In No Acute Distress] : no acute distress
[Eyelids - No Xanthelasma] : the eyelids demonstrated no xanthelasmas
[Normal Conjunctiva] : the conjunctiva exhibited no abnormalities
[No Oral Pallor] : no oral pallor
[Normal Oral Mucosa] : normal oral mucosa
[No Oral Cyanosis] : no oral cyanosis
[Normal Jugular Venous A Waves Present] : normal jugular venous A waves present
[Normal Jugular Venous V Waves Present] : normal jugular venous V waves present
[No Jugular Venous Edouard A Waves] : no jugular venous edouard A waves
[Respiration, Rhythm And Depth] : normal respiratory rhythm and effort
[Exaggerated Use Of Accessory Muscles For Inspiration] : no accessory muscle use
[Auscultation Breath Sounds / Voice Sounds] : lungs were clear to auscultation bilaterally
[Heart Rate And Rhythm] : heart rate and rhythm were normal
[Heart Sounds] : normal S1 and S2
[Systolic grade ___/6] : A grade [unfilled]/6 systolic murmur was heard.
[Diastolic Grade ___/4] : A grade [unfilled]/4 diastolic murmur was heard.
[Right Carotid Bruit] : right carotid bruit heard
[Abdomen Soft] : soft
[Abdomen Tenderness] : non-tender
[Abdomen Mass (___ Cm)] : no abdominal mass palpated
[Abnormal Walk] : normal gait
[Cyanosis, Localized] : no localized cyanosis
[Nail Clubbing] : no clubbing of the fingernails
[Petechial Hemorrhages (___cm)] : no petechial hemorrhages
[Skin Color & Pigmentation] : normal skin color and pigmentation
[No Venous Stasis] : no venous stasis
[Skin Lesions] : no skin lesions
[] : no rash
[No Skin Ulcers] : no skin ulcer
[No Xanthoma] : no  xanthoma was observed
[Affect] : the affect was normal
[Oriented To Time, Place, And Person] : oriented to person, place, and time
[Mood] : the mood was normal
[No Anxiety] : not feeling anxious
[FreeTextEntry1] : Skin tear L shin- wound clean. Re dressed after examination
<---- Click to enter wet read

## 2021-01-18 NOTE — ED PROVIDER NOTE - NSFOLLOWUPINSTRUCTIONS_ED_ALL_ED_FT
Take cefuroxime antibiotic as prescribed.  Drink more oral fluids.  Continue your regular medications as per routine.  Follow up later this week with your own regular doctor.  Avoid moving/turning too fast, don't stand up too quickly.      ED evaluation and management discussed with the patient and family (if available) in detail.  Close PMD follow up encouraged.  Strict ED return instructions discussed in detail and patient given the opportunity to ask any questions about their discharge diagnosis and instructions. Patient verbalized understanding.        Urinary Tract Infection    A urinary tract infection (UTI) is an infection of any part of the urinary tract, which includes the kidneys, ureters, bladder, and urethra. Risk factors include ignoring your need to urinate, wiping back to front if female, being an uncircumcised male, and having diabetes or a weak immune system. Symptoms include frequent urination, pain or burning with urination, foul smelling urine, cloudy urine, pain in the lower abdomen, blood in the urine, and fever. If you were prescribed an antibiotic medicine, take it as told by your health care provider. Do not stop taking the antibiotic even if you start to feel better.    SEEK IMMEDIATE MEDICAL CARE IF YOU HAVE ANY OF THE FOLLOWING SYMPTOMS: severe back or abdominal pain, fever, inability to keep fluids or medicine down, dizziness/lightheadedness, or a change in mental status.        Fall Prevention in the Home, Adult  Falls can cause injuries. They can happen to people of all ages. There are many things you can do to make your home safe and to help prevent falls. Ask for help when making these changes, if needed.  What actions can I take to prevent falls?  General Instructions     Use good lighting in all rooms. Replace any light bulbs that burn out.Turn on the lights when you go into a dark area. Use night-lights.Keep items that you use often in easy-to-reach places. Lower the shelves around your home if necessary.Set up your furniture so you have a clear path. Avoid moving your furniture around.Do not have throw rugs and other things on the floor that can make you trip.Avoid walking on wet floors.If any of your floors are uneven, fix them.Add color or contrast paint or tape to clearly chelsea and help you see:  Any grab bars or handrails.First and last steps of stairways.Where the edge of each step is.If you use a stepladder:  Make sure that it is fully opened. Do not climb a closed stepladder.Make sure that both sides of the stepladder are locked into place.Ask someone to hold the stepladder for you while you use it.If there are any pets around you, be aware of where they are.What can I do in the bathroom?         Keep the floor dry. Clean up any water that spills onto the floor as soon as it happens.Remove soap buildup in the tub or shower regularly.Use non-skid mats or decals on the floor of the tub or shower.Attach bath mats securely with double-sided, non-slip rug tape.If you need to sit down in the shower, use a plastic, non-slip stool.Install grab bars by the toilet and in the tub and shower. Do not use towel bars as grab bars.What can I do in the bedroom?     Make sure that you have a light by your bed that is easy to reach.Do not use any sheets or blankets that are too big for your bed. They should not hang down onto the floor.Have a firm chair that has side arms. You can use this for support while you get dressed.What can I do in the kitchen?     Clean up any spills right away.If you need to reach something above you, use a strong step stool that has a grab bar.Keep electrical cords out of the way.Do not use floor polish or wax that makes floors slippery. If you must use wax, use non-skid floor wax.What can I do with my stairs?     Do not leave any items on the stairs.Make sure that you have a light switch at the top of the stairs and the bottom of the stairs. If you do not have them, ask someone to add them for you.Make sure that there are handrails on both sides of the stairs, and use them. Fix handrails that are broken or loose. Make sure that handrails are as long as the stairways.Install non-slip stair treads on all stairs in your home.Avoid having throw rugs at the top or bottom of the stairs. If you do have throw rugs, attach them to the floor with carpet tape.Choose a carpet that does not hide the edge of the steps on the stairway.Check any carpeting to make sure that it is firmly attached to the stairs. Fix any carpet that is loose or worn.What can I do on the outside of my home?     Use bright outdoor lighting.Regularly fix the edges of walkways and driveways and fix any cracks.Remove anything that might make you trip as you walk through a door, such as a raised step or threshold.Trim any bushes or trees on the path to your home.Regularly check to see if handrails are loose or broken. Make sure that both sides of any steps have handrails.Install guardrails along the edges of any raised decks and porches.Clear walking paths of anything that might make someone trip, such as tools or rocks.Have any leaves, snow, or ice cleared regularly.Use sand or salt on walking paths during winter.Clean up any spills in your garage right away. This includes grease or oil spills.What other actions can I take?     Wear shoes that:  Have a low heel. Do not wear high heels.Have rubber bottoms.Are comfortable and fit you well.Are closed at the toe. Do not wear open-toe sandals.Use tools that help you move around (mobility aids) if they are needed. These include:  Canes.Walkers.Scooters.Crutches.Review your medicines with your doctor. Some medicines can make you feel dizzy. This can increase your chance of falling.Ask your doctor what other things you can do to help prevent falls.  Where to find more information  Centers for Disease Control and PreventionEMILIE: https://cdc.govNational [place]pert on Aging: https://cv8ztlt.wendy.nih.govContact a doctor if:  You are afraid of falling at home.You feel weak, drowsy, or dizzy at home.You fall at home.Summary  There are many simple things that you can do to make your home safe and to help prevent falls.Ways to make your home safe include removing tripping hazards and installing grab bars in the bathroom.Ask for help when making these changes in your home.This information is not intended to replace advice given to you by your health care provider. Make sure you discuss any questions you have with your health care provider.          Dementia Caregiver Guide    Dementia is a term used to describe a number of symptoms that affect memory and thinking. The most common symptoms include:  •Memory loss.      •Trouble with language and communication.      •Trouble concentrating.      •Poor judgment.      •Problems with reasoning.      •Child-like behavior and language.      •Extreme anxiety.      •Angry outbursts.      •Wandering from home or public places.      Dementia usually gets worse slowly over time. In the early stages, people with dementia can stay independent and safe with some help. In later stages, they need help with daily tasks such as dressing, grooming, and using the bathroom.      How to help the person with dementia cope    Dementia can be frightening and confusing. Here are some tips to help the person with dementia cope with changes caused by the disease.    General tips     •Keep the person on track with his or her routine.      •Try to identify areas where the person may need help.      •Be supportive, patient, calm, and encouraging.      •Gently remind the person that adjusting to changes takes time.      •Help with the tasks that the person has asked for help with.      •Keep the person involved in daily tasks and decisions as much as possible.      •Encourage conversation, but try not to get frustrated or harried if the person struggles to find words or does not seem to appreciate your help.      Communication tips     •When the person is talking or seems frustrated, make eye contact and hold the person's hand.      •Ask specific questions that need yes or no answers.      •Use simple words, short sentences, and a calm voice. Only give one direction at a time.      •When offering choices, limit them to just 1 or 2.      •Avoid correcting the person in a negative way.      •If the person is struggling to find the right words, gently try to help him or her.        How to recognize symptoms of stress  Symptoms of stress in caregivers include:  •Feeling frustrated or angry with the person with dementia.      •Denying that the person has dementia or that his or her symptoms will not improve.      •Feeling hopeless and unappreciated.      •Difficulty sleeping.      •Difficulty concentrating.      •Feeling anxious, irritable, or depressed.      •Developing stress-related health problems.      •Feeling like you have too little time for your own life.        Follow these instructions at home:   •Make sure that you and the person you are caring for:  •Get regular sleep.      •Exercise regularly.      •Eat regular, nutritious meals.      •Drink enough fluid to keep your urine clear or pale yellow.      •Take over-the-counter and prescription medicines only as told by your health care providers.      •Attend all scheduled health care appointments.        •Join a support group with others who are caregivers.      •Ask about respite care resources so that you can have a regular break from the stress of caregiving.      •Look for signs of stress in yourself and in the person you are caring for. If you notice signs of stress, take steps to manage it.      •Consider any safety risks and take steps to avoid them.      •Organize medications in a pill box for each day of the week.      •Create a plan to handle any legal or financial matters. Get legal or financial advice if needed.      •Keep a calendar in a central location to remind the person of appointments or other activities.        Tips for reducing the risk of injury    •Keep floors clear of clutter. Remove rugs, magazine racks, and floor lamps.      •Keep hallways well lit, especially at night.      •Put a handrail and nonslip mat in the bathtub or shower.      •Put childproof locks on cabinets that contain dangerous items, such as medicines, alcohol, guns, toxic cleaning items, sharp tools or utensils, matches, and lighters.      •Put the locks in places where the person cannot see or reach them easily. This will help ensure that the person does not wander out of the house and get lost.      •Be prepared for emergencies. Keep a list of emergency phone numbers and addresses in a convenient area.      •Remove car keys and lock garage doors so that the person does not try to get in the car and drive.      •Have the person wear a bracelet that tracks locations and identifies the person as having memory problems. This should be worn at all times for safety.        Where to find support:  Many individuals and organizations offer support. These include:  •Support groups for people with dementia and for caregivers.      •Counselors or therapists.      •Home health care services.      •Adult day care centers.        Where to find more information    Alzheimer's Association: www.alz.org      Contact a health care provider if:    •The person's health is rapidly getting worse.      •You are no longer able to care for the person.      •Caring for the person is affecting your physical and emotional health.      •The person threatens himself or herself, you, or anyone else.        Summary    •Dementia is a term used to describe a number of symptoms that affect memory and thinking.      •Dementia usually gets worse slowly over time.      •Take steps to reduce the person's risk of injury, and to plan for future care.      •Caregivers need support, relief from caregiving, and time for their own lives.      This information is not intended to replace advice given to you by your health care provider. Make sure you discuss any questions you have with your health care provider.

## 2021-01-18 NOTE — ED PROVIDER NOTE - PMH
Breast cancer    Cerebrovascular accident (CVA)    Dementia    DM (diabetes mellitus)  type 2, not on meds  LBBB (left bundle branch block)

## 2021-01-18 NOTE — ED PROVIDER NOTE - CARE PLAN
Principal Discharge DX:	Acute UTI (urinary tract infection)  Secondary Diagnosis:	Dizziness, nonspecific  Secondary Diagnosis:	Dementia  Secondary Diagnosis:	LBBB (left bundle branch block)

## 2021-01-18 NOTE — ED PROVIDER NOTE - PROGRESS NOTE DETAILS
Scribe IN for Dr. Meyer:  contacted, reports pt fell but didn't appear to hit head. Pt may have been dizzy at the time.  states this has happened before to her, reports +dementia with poor memory.  reports pt otherwise at baseline functioning. ED RN to perform ambulation trial and obtain urine. Will d/c if both are negative. Pt uses a walker at baseline. Pal GUY for Dr. Meyer: informed by ED RN, pt passed ambulation trial. DR. Meyer:  u/A macro suggestive of mild UTI; no F, lethargy/AMS, nor elev. WBC cnt, pt clinically stable.  Will D/C home on po cefuroxime.

## 2021-01-18 NOTE — ED PROVIDER NOTE - PATIENT PORTAL LINK FT
You can access the FollowMyHealth Patient Portal offered by [facility] by registering at the following website: [email or web address]. By joining [employer]’s FollowMyHealth portal, you will also be able to view your health information using other applications (apps) compatible with our system.

## 2021-02-05 ENCOUNTER — EMERGENCY (EMERGENCY)
Facility: HOSPITAL | Age: 76
LOS: 0 days | Discharge: HOME | End: 2021-02-05
Attending: EMERGENCY MEDICINE
Payer: MEDICARE

## 2021-02-05 VITALS
HEART RATE: 80 BPM | TEMPERATURE: 98 F | DIASTOLIC BLOOD PRESSURE: 93 MMHG | SYSTOLIC BLOOD PRESSURE: 122 MMHG | RESPIRATION RATE: 15 BRPM | OXYGEN SATURATION: 99 %

## 2021-02-05 VITALS
WEIGHT: 164.91 LBS | TEMPERATURE: 98 F | HEIGHT: 72 IN | RESPIRATION RATE: 16 BRPM | SYSTOLIC BLOOD PRESSURE: 153 MMHG | OXYGEN SATURATION: 99 % | HEART RATE: 68 BPM | DIASTOLIC BLOOD PRESSURE: 83 MMHG
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
## 2021-02-05 DIAGNOSIS — T82.898A OTHER SPECIFIED COMPLICATION OF VASCULAR PROSTHETIC DEVICES, IMPLANTS AND GRAFTS, INITIAL ENCOUNTER: Chronic | ICD-10-CM

## 2021-02-05 DIAGNOSIS — F03.90 UNSPECIFIED DEMENTIA, UNSPECIFIED SEVERITY, WITHOUT BEHAVIORAL DISTURBANCE, PSYCHOTIC DISTURBANCE, MOOD DISTURBANCE, AND ANXIETY: ICD-10-CM

## 2021-02-05 DIAGNOSIS — Z79.4 LONG TERM (CURRENT) USE OF INSULIN: ICD-10-CM
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
## 2021-02-05 DIAGNOSIS — Z85.3 PERSONAL HISTORY OF MALIGNANT NEOPLASM OF BREAST: ICD-10-CM

## 2021-02-05 DIAGNOSIS — E11.9 TYPE 2 DIABETES MELLITUS WITHOUT COMPLICATIONS: ICD-10-CM

## 2021-02-05 DIAGNOSIS — Z86.73 PERSONAL HISTORY OF TRANSIENT ISCHEMIC ATTACK (TIA), AND CEREBRAL INFARCTION WITHOUT RESIDUAL DEFICITS: ICD-10-CM

## 2021-02-05 DIAGNOSIS — S70.02XA CONTUSION OF LEFT HIP, INITIAL ENCOUNTER: ICD-10-CM

## 2021-02-05 DIAGNOSIS — I44.7 LEFT BUNDLE-BRANCH BLOCK, UNSPECIFIED: ICD-10-CM

## 2021-02-05 LAB
ALBUMIN SERPL ELPH-MCNC: 2.8 G/DL — LOW (ref 3.3–5)
ALP SERPL-CCNC: 80 U/L — SIGNIFICANT CHANGE UP (ref 40–120)
ALT FLD-CCNC: 23 U/L — SIGNIFICANT CHANGE UP (ref 12–78)
ANION GAP SERPL CALC-SCNC: 5 MMOL/L — SIGNIFICANT CHANGE UP (ref 5–17)
APPEARANCE UR: CLEAR — SIGNIFICANT CHANGE UP
AST SERPL-CCNC: 19 U/L — SIGNIFICANT CHANGE UP (ref 15–37)
BASOPHILS # BLD AUTO: 0.05 K/UL — SIGNIFICANT CHANGE UP (ref 0–0.2)
BASOPHILS NFR BLD AUTO: 0.7 % — SIGNIFICANT CHANGE UP (ref 0–2)
BILIRUB SERPL-MCNC: 0.5 MG/DL — SIGNIFICANT CHANGE UP (ref 0.2–1.2)
BILIRUB UR-MCNC: NEGATIVE — SIGNIFICANT CHANGE UP
BUN SERPL-MCNC: 21 MG/DL — SIGNIFICANT CHANGE UP (ref 7–23)
CALCIUM SERPL-MCNC: 8.9 MG/DL — SIGNIFICANT CHANGE UP (ref 8.5–10.1)
CHLORIDE SERPL-SCNC: 106 MMOL/L — SIGNIFICANT CHANGE UP (ref 96–108)
CO2 SERPL-SCNC: 23 MMOL/L — SIGNIFICANT CHANGE UP (ref 22–31)
COLOR SPEC: SIGNIFICANT CHANGE UP
CREAT SERPL-MCNC: 0.62 MG/DL — SIGNIFICANT CHANGE UP (ref 0.5–1.3)
DIFF PNL FLD: ABNORMAL
EOSINOPHIL # BLD AUTO: 0.21 K/UL — SIGNIFICANT CHANGE UP (ref 0–0.5)
EOSINOPHIL NFR BLD AUTO: 2.7 % — SIGNIFICANT CHANGE UP (ref 0–6)
GLUCOSE SERPL-MCNC: 292 MG/DL — HIGH (ref 70–99)
GLUCOSE UR QL: 1000 MG/DL
HCT VFR BLD CALC: 41.3 % — SIGNIFICANT CHANGE UP (ref 34.5–45)
HGB BLD-MCNC: 13.5 G/DL — SIGNIFICANT CHANGE UP (ref 11.5–15.5)
IMM GRANULOCYTES NFR BLD AUTO: 0.4 % — SIGNIFICANT CHANGE UP (ref 0–1.5)
KETONES UR-MCNC: NEGATIVE — SIGNIFICANT CHANGE UP
LEUKOCYTE ESTERASE UR-ACNC: ABNORMAL
LYMPHOCYTES # BLD AUTO: 1.76 K/UL — SIGNIFICANT CHANGE UP (ref 1–3.3)
LYMPHOCYTES # BLD AUTO: 23 % — SIGNIFICANT CHANGE UP (ref 13–44)
MCHC RBC-ENTMCNC: 30.6 PG — SIGNIFICANT CHANGE UP (ref 27–34)
MCHC RBC-ENTMCNC: 32.7 GM/DL — SIGNIFICANT CHANGE UP (ref 32–36)
MCV RBC AUTO: 93.7 FL — SIGNIFICANT CHANGE UP (ref 80–100)
MONOCYTES # BLD AUTO: 0.68 K/UL — SIGNIFICANT CHANGE UP (ref 0–0.9)
MONOCYTES NFR BLD AUTO: 8.9 % — SIGNIFICANT CHANGE UP (ref 2–14)
NEUTROPHILS # BLD AUTO: 4.92 K/UL — SIGNIFICANT CHANGE UP (ref 1.8–7.4)
NEUTROPHILS NFR BLD AUTO: 64.3 % — SIGNIFICANT CHANGE UP (ref 43–77)
NITRITE UR-MCNC: NEGATIVE — SIGNIFICANT CHANGE UP
PH UR: 7 — SIGNIFICANT CHANGE UP (ref 5–8)
PLATELET # BLD AUTO: 192 K/UL — SIGNIFICANT CHANGE UP (ref 150–400)
POTASSIUM SERPL-MCNC: 4.6 MMOL/L — SIGNIFICANT CHANGE UP (ref 3.5–5.3)
POTASSIUM SERPL-SCNC: 4.6 MMOL/L — SIGNIFICANT CHANGE UP (ref 3.5–5.3)
PROT SERPL-MCNC: 7 GM/DL — SIGNIFICANT CHANGE UP (ref 6–8.3)
PROT UR-MCNC: NEGATIVE MG/DL — SIGNIFICANT CHANGE UP
RBC # BLD: 4.41 M/UL — SIGNIFICANT CHANGE UP (ref 3.8–5.2)
RBC # FLD: 12.5 % — SIGNIFICANT CHANGE UP (ref 10.3–14.5)
SODIUM SERPL-SCNC: 134 MMOL/L — LOW (ref 135–145)
SP GR SPEC: 1.01 — SIGNIFICANT CHANGE UP (ref 1.01–1.02)
TROPONIN I SERPL-MCNC: <0.015 NG/ML — SIGNIFICANT CHANGE UP (ref 0.01–0.04)
TROPONIN I SERPL-MCNC: <0.015 NG/ML — SIGNIFICANT CHANGE UP (ref 0.01–0.04)
UROBILINOGEN FLD QL: NEGATIVE MG/DL — SIGNIFICANT CHANGE UP
WBC # BLD: 7.65 K/UL — SIGNIFICANT CHANGE UP (ref 3.8–10.5)
WBC # FLD AUTO: 7.65 K/UL — SIGNIFICANT CHANGE UP (ref 3.8–10.5)

## 2021-02-05 PROCEDURE — 70450 CT HEAD/BRAIN W/O DYE: CPT | Mod: 26

## 2021-02-05 PROCEDURE — 93010 ELECTROCARDIOGRAM REPORT: CPT

## 2021-02-05 PROCEDURE — 73502 X-RAY EXAM HIP UNI 2-3 VIEWS: CPT | Mod: LT

## 2021-02-05 PROCEDURE — 73502 X-RAY EXAM HIP UNI 2-3 VIEWS: CPT | Mod: 26,LT

## 2021-02-05 PROCEDURE — 93010 ELECTROCARDIOGRAM REPORT: CPT | Mod: 76

## 2021-02-05 PROCEDURE — 73552 X-RAY EXAM OF FEMUR 2/>: CPT | Mod: LT

## 2021-02-05 PROCEDURE — 71045 X-RAY EXAM CHEST 1 VIEW: CPT

## 2021-02-05 PROCEDURE — 71045 X-RAY EXAM CHEST 1 VIEW: CPT | Mod: 26

## 2021-02-05 PROCEDURE — 36415 COLL VENOUS BLD VENIPUNCTURE: CPT

## 2021-02-05 PROCEDURE — 70450 CT HEAD/BRAIN W/O DYE: CPT

## 2021-02-05 PROCEDURE — 72125 CT NECK SPINE W/O DYE: CPT | Mod: 26

## 2021-02-05 PROCEDURE — 80053 COMPREHEN METABOLIC PANEL: CPT

## 2021-02-05 PROCEDURE — 72125 CT NECK SPINE W/O DYE: CPT

## 2021-02-05 PROCEDURE — 99283 EMERGENCY DEPT VISIT LOW MDM: CPT

## 2021-02-05 PROCEDURE — 82962 GLUCOSE BLOOD TEST: CPT

## 2021-02-05 PROCEDURE — 85025 COMPLETE CBC W/AUTO DIFF WBC: CPT

## 2021-02-05 PROCEDURE — 84484 ASSAY OF TROPONIN QUANT: CPT

## 2021-02-05 PROCEDURE — 87086 URINE CULTURE/COLONY COUNT: CPT

## 2021-02-05 PROCEDURE — 93005 ELECTROCARDIOGRAM TRACING: CPT

## 2021-02-05 PROCEDURE — 81001 URINALYSIS AUTO W/SCOPE: CPT

## 2021-02-05 PROCEDURE — 99284 EMERGENCY DEPT VISIT MOD MDM: CPT | Mod: 25

## 2021-02-05 PROCEDURE — 73552 X-RAY EXAM OF FEMUR 2/>: CPT | Mod: 26,LT

## 2021-02-05 RX ORDER — ACETAMINOPHEN 500 MG
1000 TABLET ORAL ONCE
Refills: 0 | Status: COMPLETED | OUTPATIENT
Start: 2021-02-05 | End: 2021-02-05

## 2021-02-05 RX ADMIN — Medication 1000 MILLIGRAM(S): at 15:24

## 2021-02-05 NOTE — ED PROVIDER NOTE - PROGRESS NOTE DETAILS
Pal BORJA for ED attending, Dr. Henning: Called  Rhys Qureshi at [phone]. Pt has had several episodes of fall, pt is supposed to use walker, may not have used it today and had unwitnessed fall.  updated of CT findings, agrees with plan to check labs; if negative pt can be discharged home. 2nd troponin negative.  Well on reeval.  Okay for d/c home.  Strict return precautions given. Nakul Warner D.O.

## 2021-02-05 NOTE — ED PROVIDER NOTE - MUSCULOSKELETAL, MLM
Spine appears normal, range of motion is not limited, scalp, spine non-tender, some TTP to left hip but full passive ROM without pain

## 2021-02-05 NOTE — ED PROVIDER NOTE - CONSTITUTIONAL, MLM
normal... Well appearing, awake, alert, oriented to person and place only and in no apparent distress.

## 2021-02-05 NOTE — ED ADULT TRIAGE NOTE - CHIEF COMPLAINT QUOTE
Pt to ED unwitnessed fall from standing, pt denies blood thinners. Pt states she has not walked since her fall. Unknown if she hit her head.

## 2021-02-05 NOTE — ED PROVIDER NOTE - OBJECTIVE STATEMENT
76 y/o female with PMHx of dementia, CVA, breast CA, DM, LBBB presents to the ED s/p unwitnessed fall from standing. Pt states she does not know how she fell, is denying any pain at this time.

## 2021-02-05 NOTE — ED PROVIDER NOTE - CARE PLAN
Principal Discharge DX:	Fall   Principal Discharge DX:	Contusion of left hip, initial encounter  Secondary Diagnosis:	Fall, initial encounter

## 2021-02-05 NOTE — ED ADULT NURSE NOTE - OBJECTIVE STATEMENT
Pt BIBA for c/o of fall. Pt poor historian and forgetful. Pt appears awake with stable VS. No acute distress at this time.

## 2021-02-05 NOTE — ED PROVIDER NOTE - PATIENT PORTAL LINK FT
You can access the FollowMyHealth Patient Portal offered by [facility] by registering at the following website: [email or web address]. By joining [employer]’s FollowMyHealth portal, you will also be able to view your health information using other applications (apps) compatible with our system.

## 2021-02-05 NOTE — ED PROVIDER NOTE - NEUROLOGICAL, MLM
Alert and oriented, no focal deficits, no motor or sensory deficits. Alert, no focal deficits, no motor or sensory deficits.

## 2021-02-07 LAB
CULTURE RESULTS: SIGNIFICANT CHANGE UP
SPECIMEN SOURCE: SIGNIFICANT CHANGE UP

## 2021-06-25 ENCOUNTER — INPATIENT (INPATIENT)
Facility: HOSPITAL | Age: 76
LOS: 6 days | Discharge: NURSING, REHAB OR LONG-TERM CARE | DRG: 637 | End: 2021-07-02
Attending: INTERNAL MEDICINE | Admitting: INTERNAL MEDICINE
Payer: MEDICARE

## 2021-06-25 VITALS
HEIGHT: 72 IN | RESPIRATION RATE: 18 BRPM | OXYGEN SATURATION: 94 % | WEIGHT: 160.06 LBS | DIASTOLIC BLOOD PRESSURE: 74 MMHG | HEART RATE: 83 BPM | SYSTOLIC BLOOD PRESSURE: 151 MMHG | TEMPERATURE: 98 F

## 2021-06-25 DIAGNOSIS — T82.898A OTHER SPECIFIED COMPLICATION OF VASCULAR PROSTHETIC DEVICES, IMPLANTS AND GRAFTS, INITIAL ENCOUNTER: Chronic | ICD-10-CM

## 2021-06-25 DIAGNOSIS — N39.0 URINARY TRACT INFECTION, SITE NOT SPECIFIED: ICD-10-CM

## 2021-06-25 LAB
ACETONE SERPL-MCNC: NEGATIVE — SIGNIFICANT CHANGE UP
ALBUMIN SERPL ELPH-MCNC: 2.9 G/DL — LOW (ref 3.3–5)
ALP SERPL-CCNC: 67 U/L — SIGNIFICANT CHANGE UP (ref 40–120)
ALT FLD-CCNC: 19 U/L — SIGNIFICANT CHANGE UP (ref 12–78)
ANION GAP SERPL CALC-SCNC: 4 MMOL/L — LOW (ref 5–17)
APPEARANCE UR: CLEAR — SIGNIFICANT CHANGE UP
AST SERPL-CCNC: 25 U/L — SIGNIFICANT CHANGE UP (ref 15–37)
BASE EXCESS BLDV CALC-SCNC: 3 MMOL/L — HIGH (ref -2–2)
BASOPHILS # BLD AUTO: 0.03 K/UL — SIGNIFICANT CHANGE UP (ref 0–0.2)
BASOPHILS NFR BLD AUTO: 0.4 % — SIGNIFICANT CHANGE UP (ref 0–2)
BILIRUB SERPL-MCNC: 0.4 MG/DL — SIGNIFICANT CHANGE UP (ref 0.2–1.2)
BILIRUB UR-MCNC: NEGATIVE — SIGNIFICANT CHANGE UP
BUN SERPL-MCNC: 23 MG/DL — SIGNIFICANT CHANGE UP (ref 7–23)
CALCIUM SERPL-MCNC: 8.8 MG/DL — SIGNIFICANT CHANGE UP (ref 8.5–10.1)
CHLORIDE SERPL-SCNC: 101 MMOL/L — SIGNIFICANT CHANGE UP (ref 96–108)
CO2 SERPL-SCNC: 29 MMOL/L — SIGNIFICANT CHANGE UP (ref 22–31)
COLOR SPEC: YELLOW — SIGNIFICANT CHANGE UP
CREAT SERPL-MCNC: 0.79 MG/DL — SIGNIFICANT CHANGE UP (ref 0.5–1.3)
DIFF PNL FLD: ABNORMAL
EOSINOPHIL # BLD AUTO: 0.21 K/UL — SIGNIFICANT CHANGE UP (ref 0–0.5)
EOSINOPHIL NFR BLD AUTO: 2.5 % — SIGNIFICANT CHANGE UP (ref 0–6)
GLUCOSE SERPL-MCNC: 378 MG/DL — HIGH (ref 70–99)
GLUCOSE UR QL: 1000 MG/DL
HCO3 BLDV-SCNC: 29 MMOL/L — SIGNIFICANT CHANGE UP (ref 21–29)
HCT VFR BLD CALC: 40 % — SIGNIFICANT CHANGE UP (ref 34.5–45)
HGB BLD-MCNC: 13.2 G/DL — SIGNIFICANT CHANGE UP (ref 11.5–15.5)
IMM GRANULOCYTES NFR BLD AUTO: 0.4 % — SIGNIFICANT CHANGE UP (ref 0–1.5)
KETONES UR-MCNC: NEGATIVE — SIGNIFICANT CHANGE UP
LACTATE SERPL-SCNC: 0.8 MMOL/L — SIGNIFICANT CHANGE UP (ref 0.7–2)
LEUKOCYTE ESTERASE UR-ACNC: ABNORMAL
LYMPHOCYTES # BLD AUTO: 2.31 K/UL — SIGNIFICANT CHANGE UP (ref 1–3.3)
LYMPHOCYTES # BLD AUTO: 27.8 % — SIGNIFICANT CHANGE UP (ref 13–44)
MAGNESIUM SERPL-MCNC: 1.9 MG/DL — SIGNIFICANT CHANGE UP (ref 1.6–2.6)
MCHC RBC-ENTMCNC: 30.4 PG — SIGNIFICANT CHANGE UP (ref 27–34)
MCHC RBC-ENTMCNC: 33 GM/DL — SIGNIFICANT CHANGE UP (ref 32–36)
MCV RBC AUTO: 92.2 FL — SIGNIFICANT CHANGE UP (ref 80–100)
MONOCYTES # BLD AUTO: 0.59 K/UL — SIGNIFICANT CHANGE UP (ref 0–0.9)
MONOCYTES NFR BLD AUTO: 7.1 % — SIGNIFICANT CHANGE UP (ref 2–14)
NEUTROPHILS # BLD AUTO: 5.15 K/UL — SIGNIFICANT CHANGE UP (ref 1.8–7.4)
NEUTROPHILS NFR BLD AUTO: 61.8 % — SIGNIFICANT CHANGE UP (ref 43–77)
NITRITE UR-MCNC: NEGATIVE — SIGNIFICANT CHANGE UP
PCO2 BLDV: 50 MMHG — SIGNIFICANT CHANGE UP (ref 35–50)
PH BLDV: 7.38 — SIGNIFICANT CHANGE UP (ref 7.35–7.45)
PH UR: 6.5 — SIGNIFICANT CHANGE UP (ref 5–8)
PLATELET # BLD AUTO: 238 K/UL — SIGNIFICANT CHANGE UP (ref 150–400)
PO2 BLDV: 72 MMHG — HIGH (ref 25–45)
POTASSIUM SERPL-MCNC: 4.6 MMOL/L — SIGNIFICANT CHANGE UP (ref 3.5–5.3)
POTASSIUM SERPL-SCNC: 4.6 MMOL/L — SIGNIFICANT CHANGE UP (ref 3.5–5.3)
PROT SERPL-MCNC: 6.9 GM/DL — SIGNIFICANT CHANGE UP (ref 6–8.3)
PROT UR-MCNC: 15 MG/DL
RBC # BLD: 4.34 M/UL — SIGNIFICANT CHANGE UP (ref 3.8–5.2)
RBC # FLD: 12.6 % — SIGNIFICANT CHANGE UP (ref 10.3–14.5)
SAO2 % BLDV: 94 % — HIGH (ref 67–88)
SARS-COV-2 RNA SPEC QL NAA+PROBE: SIGNIFICANT CHANGE UP
SODIUM SERPL-SCNC: 134 MMOL/L — LOW (ref 135–145)
SP GR SPEC: 1.01 — SIGNIFICANT CHANGE UP (ref 1.01–1.02)
UROBILINOGEN FLD QL: NEGATIVE MG/DL — SIGNIFICANT CHANGE UP
WBC # BLD: 8.32 K/UL — SIGNIFICANT CHANGE UP (ref 3.8–10.5)
WBC # FLD AUTO: 8.32 K/UL — SIGNIFICANT CHANGE UP (ref 3.8–10.5)

## 2021-06-25 PROCEDURE — 99285 EMERGENCY DEPT VISIT HI MDM: CPT

## 2021-06-25 PROCEDURE — U0005: CPT

## 2021-06-25 PROCEDURE — 36415 COLL VENOUS BLD VENIPUNCTURE: CPT

## 2021-06-25 PROCEDURE — 93880 EXTRACRANIAL BILAT STUDY: CPT

## 2021-06-25 PROCEDURE — 93010 ELECTROCARDIOGRAM REPORT: CPT

## 2021-06-25 PROCEDURE — 80048 BASIC METABOLIC PNL TOTAL CA: CPT

## 2021-06-25 PROCEDURE — 85027 COMPLETE CBC AUTOMATED: CPT

## 2021-06-25 PROCEDURE — 71045 X-RAY EXAM CHEST 1 VIEW: CPT | Mod: 26

## 2021-06-25 PROCEDURE — 97116 GAIT TRAINING THERAPY: CPT | Mod: GP

## 2021-06-25 PROCEDURE — 93005 ELECTROCARDIOGRAM TRACING: CPT

## 2021-06-25 PROCEDURE — U0003: CPT

## 2021-06-25 PROCEDURE — 97162 PT EVAL MOD COMPLEX 30 MIN: CPT | Mod: GP

## 2021-06-25 PROCEDURE — 83036 HEMOGLOBIN GLYCOSYLATED A1C: CPT

## 2021-06-25 PROCEDURE — 84443 ASSAY THYROID STIM HORMONE: CPT

## 2021-06-25 PROCEDURE — 99223 1ST HOSP IP/OBS HIGH 75: CPT

## 2021-06-25 PROCEDURE — 97530 THERAPEUTIC ACTIVITIES: CPT | Mod: GP

## 2021-06-25 PROCEDURE — 93306 TTE W/DOPPLER COMPLETE: CPT

## 2021-06-25 PROCEDURE — 86769 SARS-COV-2 COVID-19 ANTIBODY: CPT

## 2021-06-25 PROCEDURE — 82962 GLUCOSE BLOOD TEST: CPT

## 2021-06-25 PROCEDURE — 71045 X-RAY EXAM CHEST 1 VIEW: CPT

## 2021-06-25 PROCEDURE — 70450 CT HEAD/BRAIN W/O DYE: CPT | Mod: 26,MA

## 2021-06-25 RX ORDER — ACETAMINOPHEN 500 MG
650 TABLET ORAL EVERY 6 HOURS
Refills: 0 | Status: DISCONTINUED | OUTPATIENT
Start: 2021-06-25 | End: 2021-07-02

## 2021-06-25 RX ORDER — SODIUM CHLORIDE 9 MG/ML
1000 INJECTION INTRAMUSCULAR; INTRAVENOUS; SUBCUTANEOUS
Refills: 0 | Status: DISCONTINUED | OUTPATIENT
Start: 2021-06-25 | End: 2021-06-26

## 2021-06-25 RX ORDER — ACETAMINOPHEN 500 MG
650 TABLET ORAL EVERY 6 HOURS
Refills: 0 | Status: DISCONTINUED | OUTPATIENT
Start: 2021-06-25 | End: 2021-06-25

## 2021-06-25 RX ORDER — SODIUM CHLORIDE 9 MG/ML
1000 INJECTION INTRAMUSCULAR; INTRAVENOUS; SUBCUTANEOUS ONCE
Refills: 0 | Status: COMPLETED | OUTPATIENT
Start: 2021-06-25 | End: 2021-06-25

## 2021-06-25 RX ORDER — HEPARIN SODIUM 5000 [USP'U]/ML
5000 INJECTION INTRAVENOUS; SUBCUTANEOUS EVERY 12 HOURS
Refills: 0 | Status: DISCONTINUED | OUTPATIENT
Start: 2021-06-25 | End: 2021-07-02

## 2021-06-25 RX ORDER — INSULIN LISPRO 100/ML
3 VIAL (ML) SUBCUTANEOUS ONCE
Refills: 0 | Status: COMPLETED | OUTPATIENT
Start: 2021-06-25 | End: 2021-06-26

## 2021-06-25 RX ORDER — CEFTRIAXONE 500 MG/1
2000 INJECTION, POWDER, FOR SOLUTION INTRAMUSCULAR; INTRAVENOUS ONCE
Refills: 0 | Status: DISCONTINUED | OUTPATIENT
Start: 2021-06-25 | End: 2021-06-25

## 2021-06-25 RX ORDER — CEFTRIAXONE 500 MG/1
1000 INJECTION, POWDER, FOR SOLUTION INTRAMUSCULAR; INTRAVENOUS EVERY 24 HOURS
Refills: 0 | Status: DISCONTINUED | OUTPATIENT
Start: 2021-06-25 | End: 2021-06-28

## 2021-06-25 RX ORDER — SODIUM CHLORIDE 9 MG/ML
1000 INJECTION, SOLUTION INTRAVENOUS
Refills: 0 | Status: DISCONTINUED | OUTPATIENT
Start: 2021-06-25 | End: 2021-06-26

## 2021-06-25 RX ORDER — CEFTRIAXONE 500 MG/1
2000 INJECTION, POWDER, FOR SOLUTION INTRAMUSCULAR; INTRAVENOUS ONCE
Refills: 0 | Status: COMPLETED | OUTPATIENT
Start: 2021-06-25 | End: 2021-06-25

## 2021-06-25 RX ORDER — CHOLECALCIFEROL (VITAMIN D3) 125 MCG
1 CAPSULE ORAL
Qty: 0 | Refills: 0 | DISCHARGE

## 2021-06-25 RX ORDER — DEXTROSE 50 % IN WATER 50 %
25 SYRINGE (ML) INTRAVENOUS ONCE
Refills: 0 | Status: DISCONTINUED | OUTPATIENT
Start: 2021-06-25 | End: 2021-06-26

## 2021-06-25 RX ORDER — DEXTROSE 50 % IN WATER 50 %
15 SYRINGE (ML) INTRAVENOUS ONCE
Refills: 0 | Status: DISCONTINUED | OUTPATIENT
Start: 2021-06-25 | End: 2021-06-26

## 2021-06-25 RX ORDER — ONDANSETRON 8 MG/1
4 TABLET, FILM COATED ORAL EVERY 6 HOURS
Refills: 0 | Status: DISCONTINUED | OUTPATIENT
Start: 2021-06-25 | End: 2021-07-02

## 2021-06-25 RX ORDER — INSULIN HUMAN 100 [IU]/ML
6 INJECTION, SOLUTION SUBCUTANEOUS ONCE
Refills: 0 | Status: COMPLETED | OUTPATIENT
Start: 2021-06-25 | End: 2021-06-25

## 2021-06-25 RX ORDER — CALCIUM CARBONATE 500(1250)
1 TABLET ORAL
Qty: 0 | Refills: 0 | DISCHARGE

## 2021-06-25 RX ORDER — INSULIN LISPRO 100/ML
VIAL (ML) SUBCUTANEOUS
Refills: 0 | Status: DISCONTINUED | OUTPATIENT
Start: 2021-06-25 | End: 2021-06-26

## 2021-06-25 RX ORDER — DEXTROSE 50 % IN WATER 50 %
12.5 SYRINGE (ML) INTRAVENOUS ONCE
Refills: 0 | Status: DISCONTINUED | OUTPATIENT
Start: 2021-06-25 | End: 2021-06-26

## 2021-06-25 RX ORDER — SENNA PLUS 8.6 MG/1
2 TABLET ORAL AT BEDTIME
Refills: 0 | Status: DISCONTINUED | OUTPATIENT
Start: 2021-06-25 | End: 2021-07-02

## 2021-06-25 RX ORDER — GLUCAGON INJECTION, SOLUTION 0.5 MG/.1ML
1 INJECTION, SOLUTION SUBCUTANEOUS ONCE
Refills: 0 | Status: DISCONTINUED | OUTPATIENT
Start: 2021-06-25 | End: 2021-06-26

## 2021-06-25 RX ADMIN — SODIUM CHLORIDE 125 MILLILITER(S): 9 INJECTION INTRAMUSCULAR; INTRAVENOUS; SUBCUTANEOUS at 04:20

## 2021-06-25 RX ADMIN — Medication 10: at 16:57

## 2021-06-25 RX ADMIN — SODIUM CHLORIDE 1000 MILLILITER(S): 9 INJECTION INTRAMUSCULAR; INTRAVENOUS; SUBCUTANEOUS at 04:44

## 2021-06-25 RX ADMIN — INSULIN HUMAN 6 UNIT(S): 100 INJECTION, SOLUTION SUBCUTANEOUS at 04:19

## 2021-06-25 RX ADMIN — HEPARIN SODIUM 5000 UNIT(S): 5000 INJECTION INTRAVENOUS; SUBCUTANEOUS at 22:05

## 2021-06-25 RX ADMIN — CEFTRIAXONE 2000 MILLIGRAM(S): 500 INJECTION, POWDER, FOR SOLUTION INTRAMUSCULAR; INTRAVENOUS at 04:19

## 2021-06-25 RX ADMIN — SODIUM CHLORIDE 2000 MILLILITER(S): 9 INJECTION INTRAMUSCULAR; INTRAVENOUS; SUBCUTANEOUS at 01:59

## 2021-06-25 NOTE — ED PROVIDER NOTE - OBJECTIVE STATEMENT
77 yo pt PMHx of dementia, CVA, breast CA, DM, LBBB presents with daughter for AMS and falls.  Pt lives with  (primary care giver) who was admitted today in the hospital.  pt here with daughter.  Daughter noticed tonight that pt was unsteady and almost feel twice.  Pt also had episode of being very agitated and aggressive.  Daughter checked blood sugar and was over 400. Pt is not compliant with medication and not prescribed any meds.  Pt has no pmd.

## 2021-06-25 NOTE — ED PROVIDER NOTE - CARE PLAN
Principal Discharge DX:	Urinary tract infection without hematuria, site unspecified  Secondary Diagnosis:	Altered mental status, unspecified altered mental status type  Secondary Diagnosis:	Hyperglycemia

## 2021-06-25 NOTE — ED PROVIDER NOTE - CLINICAL SUMMARY MEDICAL DECISION MAKING FREE TEXT BOX
75 yo pt presents with hyperglycemia and AMS.  pt found to have uti and BS of 378.  pt primary care giver now hospitalized and no one to care for pt at home.

## 2021-06-25 NOTE — ED PROVIDER NOTE - SKIN, MLM
Skin normal color for race, warm, dry and intact. No evidence of rash. Skin normal color for race, warm, dry.  Stage 1 decubitus ulcer buttocks and sacral region.

## 2021-06-25 NOTE — ED ADULT TRIAGE NOTE - CHIEF COMPLAINT QUOTE
Patient from home, has not taken her diabetic medications in years.   with EMS, daughter checked it earlier and saw it was high.  Patient denies any symptoms.  Dementia at baseline.

## 2021-06-25 NOTE — ED ADULT NURSE REASSESSMENT NOTE - NS ED NURSE REASSESS COMMENT FT1
Pt resting comfortably in bed with no acute distress noted. daughter at bedside. Pt updated on their status, the current plan of care, and available results no needs or requests at this time. Call bell within reach. Will continue to monitor/reassess.

## 2021-06-25 NOTE — PHARMACOTHERAPY INTERVENTION NOTE - COMMENTS
med history complete, patient is unable to provide meaningful history, states she does not take any medications, confirmed with doctor first med profile

## 2021-06-25 NOTE — PROVIDER CONTACT NOTE (OTHER) - ACTION/TREATMENT ORDERED:
Orders for 3 units of Admelog per AGUSTIN Buckner, will reassess BGM prior to administration. Will continue to monitor.

## 2021-06-25 NOTE — ED ADULT NURSE NOTE - OBJECTIVE STATEMENT
pt. WILL, with daughter at bedside, As per daughter pt. had dementia but today had AMS and seemed agitated. pt. also was hyperglycemic at home, BG: >400. and was unsteady on her feet. Pt. AOx2 confused to time and situation. BGM checked. pt. does not complains of pain or discomfort. bleeding Pressure ulcer noted to sacrum/

## 2021-06-25 NOTE — H&P ADULT - ASSESSMENT
76F.  hx dementia.  presented to ED 06/25/2021.  patient lives with her  (primary care giver).  he was admitted to .  afterward, patient was being watched by her daughter.  it appeared to her that she was more forgetful and confused then usual.  she also was brought to the ED for evaluation.  in ED, FS >400mg/dL.  UA (+) WBCs.  UCx and BCx x 2 sets obtained in ED.  given ceftriaxone, IVFs and 6 units of insulin.    PMHx:  LBBB;  CVA;  DM;  breast CA; dementia.    UTI.  - UCx:  pending.  - BCx:  pending.  - ceftriaxone.    hx DM.  - uncontrolled.  - A1C.  - consistent CHO diet.  - correction insulin coverage.    hx CVA.  - although patient apparently takes no medications, will start ASA and ST as there does not appear to be an absolute contraindication.    debility.  - PT.    DVT prophylaxis.  - UFH sq.    disposition.  - [facility].  - CM, re:  DC planning and placement.    communication.  - ED RN. 76F.  hx dementia.  presented to ED 06/25/2021.  patient lives with her  (primary care giver).  he was admitted to .  afterward, patient was being watched by her daughter.  it appeared to her that she was more forgetful and confused then usual.  she also was brought to the ED for evaluation.  in ED, FS >400mg/dL.  UA (+) WBCs.  UCx and BCx x 2 sets obtained in ED.  given ceftriaxone, IVFs and 6 units of insulin.    PMHx:  LBBB;  CVA;  DM;  breast CA; dementia.    UTI.  - UCx:  pending.  - BCx:  pending.  - ceftriaxone.    hx DM.  - uncontrolled.  - A1C.  - consistent CHO diet.  - correction insulin coverage.    hx CVA.  - lipid panel.  - notably patient is not rx'd AP or ST.  - will further clarify and if no contraindication, plan to start ASA and atorvastatin.    debility.  - PT.    DVT prophylaxis.  - UFH sq.    disposition.  - [facility].  - CM, re:  DC planning and placement.    communication.  - ED RN.

## 2021-06-25 NOTE — H&P ADULT - HISTORY OF PRESENT ILLNESS
CC:  Patient is a 76y old  Female who presents with a chief complaint of confusion.    HPI:  76F.  hx dementia.  presented to ED 06/25/2021.  patient lives with her  (primary care giver).  he was admitted to .  afterward, patient was being watched by her daughter.  it appeared to her that she was more forgetful and confused then usual.  she also was brought to the ED for evaluation.  in ED, FS >400mg/dL.  UA (+) WBCs.  UCx and BCx x 2 sets obtained in ED.  given ceftriaxone, IVFs and 6 units of insulin.    PMHx:  LBBB;  CVA;  DM;  breast CA; dementia.    ROS:      all other review of systems are negative unless indicated above.    PAST MEDICAL & SURGICAL HISTORY:  DM (diabetes mellitus)  type 2, not on meds    Breast cancer    Cerebrovascular accident (CVA)    Dementia    LBBB (left bundle branch block)    Carotid stent occlusion        Allergies    No Known Allergies    Intolerances        Home Medications:  ·	patient does not take home medications.  pharmD confirmed w/ Dr. Gutiérrez.    Social History:  ·	.  ·	lives in a private home with her .  ·	children like locally and help them out.  ·	denied hx of tobacco, EtOH or street drugs.    FAMILY HISTORY:  No pertinent family history  of cancer        Vital Signs Last 24 Hrs  T(C): 36.4 (25 Jun 2021 07:05), Max: 36.6 (25 Jun 2021 00:48)  T(F): 97.6 (25 Jun 2021 07:05), Max: 97.9 (25 Jun 2021 00:48)  HR: 65 (25 Jun 2021 07:05) (65 - 83)  BP: 150/65 (25 Jun 2021 07:05) (133/67 - 151/74)  BP(mean): 83 (25 Jun 2021 07:05) (83 - 83)  RR: 18 (25 Jun 2021 07:05) (18 - 18)  SpO2: 100% (25 Jun 2021 07:05) (94% - 100%)    Constitutional: NAD.   HEENT: PERRL, EOMI, MMM.  Neck: Soft and supple, No carotid bruit, No JVD  Respiratory: Breath sounds are clear bilaterally, No wheezing, rales or rhonchi  Cardiovascular: EDOUARD 2/6.  regular.  Gastrointestinal: Bowel Sounds present, soft, nontender, nondistended, no guarding, no rebound, no mass.  Extremities: No peripheral edema  Vascular: 2+ peripheral pulses  Neurological: A/O x 2, no focal deficits  Musculoskeletal: 5/5 strength b/l upper and lower extremities  Skin:  no visible rashes.                             13.2   8.32  )-----------( 238      ( 25 Jun 2021 01:39 )             40.0           06-25    134<L>  |  101  |  23  ----------------------------<  378<H>  4.6   |  29  |  0.79    Ca    8.8      25 Jun 2021 01:39  Mg     1.9     06-25    TPro  6.9  /  Alb  2.9<L>  /  TBili  0.4  /  DBili  x   /  AST  25  /  ALT  19  /  AlkPhos  67  06-25      LIVER FUNCTIONS - ( 25 Jun 2021 01:39 )  Alb: 2.9 g/dL / Pro: 6.9 gm/dL / ALK PHOS: 67 U/L / ALT: 19 U/L / AST: 25 U/L / GGT: x             < from: CT Head No Cont (06.25.21 @ 02:40) >  IMPRESSION:    No acute intracranial hemorrhage, mass effect, or acute displaced calvarium fracture. Mild involutionaland chronic microvascular ischemic gliotic changes.    If there are new or persistent symptoms, consider further evaluation with MRI provided no contraindications.    < end of copied text >  < from: 12 Lead ECG (06.25.21 @ 04:31) >  Diagnosis Line Normal sinus rhythm  Left bundle branch block  Abnormal ECG  When compared with ECG of 05-FEB-2021 15:27,  No significant change was found    < end of copied text >

## 2021-06-26 LAB
A1C WITH ESTIMATED AVERAGE GLUCOSE RESULT: 13.4 % — HIGH (ref 4–5.6)
COVID-19 SPIKE DOMAIN AB INTERP: NEGATIVE — SIGNIFICANT CHANGE UP
COVID-19 SPIKE DOMAIN ANTIBODY RESULT: 0.4 U/ML — SIGNIFICANT CHANGE UP
ESTIMATED AVERAGE GLUCOSE: 338 MG/DL — HIGH (ref 68–114)
SARS-COV-2 IGG+IGM SERPL QL IA: 0.4 U/ML — SIGNIFICANT CHANGE UP
SARS-COV-2 IGG+IGM SERPL QL IA: NEGATIVE — SIGNIFICANT CHANGE UP

## 2021-06-26 PROCEDURE — 99233 SBSQ HOSP IP/OBS HIGH 50: CPT

## 2021-06-26 RX ORDER — INSULIN LISPRO 100/ML
VIAL (ML) SUBCUTANEOUS AT BEDTIME
Refills: 0 | Status: DISCONTINUED | OUTPATIENT
Start: 2021-06-26 | End: 2021-06-27

## 2021-06-26 RX ORDER — INSULIN GLARGINE 100 [IU]/ML
10 INJECTION, SOLUTION SUBCUTANEOUS AT BEDTIME
Refills: 0 | Status: DISCONTINUED | OUTPATIENT
Start: 2021-06-26 | End: 2021-06-27

## 2021-06-26 RX ORDER — ASPIRIN/CALCIUM CARB/MAGNESIUM 324 MG
81 TABLET ORAL DAILY
Refills: 0 | Status: DISCONTINUED | OUTPATIENT
Start: 2021-06-26 | End: 2021-07-02

## 2021-06-26 RX ORDER — SODIUM CHLORIDE 9 MG/ML
1000 INJECTION, SOLUTION INTRAVENOUS
Refills: 0 | Status: DISCONTINUED | OUTPATIENT
Start: 2021-06-26 | End: 2021-06-27

## 2021-06-26 RX ORDER — GLUCAGON INJECTION, SOLUTION 0.5 MG/.1ML
1 INJECTION, SOLUTION SUBCUTANEOUS ONCE
Refills: 0 | Status: DISCONTINUED | OUTPATIENT
Start: 2021-06-26 | End: 2021-06-27

## 2021-06-26 RX ORDER — DEXTROSE 50 % IN WATER 50 %
25 SYRINGE (ML) INTRAVENOUS ONCE
Refills: 0 | Status: DISCONTINUED | OUTPATIENT
Start: 2021-06-26 | End: 2021-06-27

## 2021-06-26 RX ORDER — ATORVASTATIN CALCIUM 80 MG/1
20 TABLET, FILM COATED ORAL AT BEDTIME
Refills: 0 | Status: DISCONTINUED | OUTPATIENT
Start: 2021-06-26 | End: 2021-07-02

## 2021-06-26 RX ORDER — INSULIN LISPRO 100/ML
VIAL (ML) SUBCUTANEOUS
Refills: 0 | Status: DISCONTINUED | OUTPATIENT
Start: 2021-06-26 | End: 2021-06-27

## 2021-06-26 RX ORDER — DEXTROSE 50 % IN WATER 50 %
12.5 SYRINGE (ML) INTRAVENOUS ONCE
Refills: 0 | Status: DISCONTINUED | OUTPATIENT
Start: 2021-06-26 | End: 2021-06-27

## 2021-06-26 RX ORDER — DEXTROSE 50 % IN WATER 50 %
15 SYRINGE (ML) INTRAVENOUS ONCE
Refills: 0 | Status: DISCONTINUED | OUTPATIENT
Start: 2021-06-26 | End: 2021-06-27

## 2021-06-26 RX ADMIN — Medication 6: at 21:29

## 2021-06-26 RX ADMIN — Medication 8: at 11:54

## 2021-06-26 RX ADMIN — CEFTRIAXONE 1000 MILLIGRAM(S): 500 INJECTION, POWDER, FOR SOLUTION INTRAMUSCULAR; INTRAVENOUS at 05:20

## 2021-06-26 RX ADMIN — Medication 81 MILLIGRAM(S): at 17:08

## 2021-06-26 RX ADMIN — Medication 3 UNIT(S): at 00:11

## 2021-06-26 RX ADMIN — Medication 6: at 16:53

## 2021-06-26 RX ADMIN — HEPARIN SODIUM 5000 UNIT(S): 5000 INJECTION INTRAVENOUS; SUBCUTANEOUS at 09:25

## 2021-06-26 RX ADMIN — ATORVASTATIN CALCIUM 20 MILLIGRAM(S): 80 TABLET, FILM COATED ORAL at 21:30

## 2021-06-26 RX ADMIN — HEPARIN SODIUM 5000 UNIT(S): 5000 INJECTION INTRAVENOUS; SUBCUTANEOUS at 21:30

## 2021-06-26 RX ADMIN — INSULIN GLARGINE 10 UNIT(S): 100 INJECTION, SOLUTION SUBCUTANEOUS at 21:30

## 2021-06-26 RX ADMIN — Medication 4: at 07:42

## 2021-06-26 NOTE — PHYSICAL THERAPY INITIAL EVALUATION ADULT - STANDING BALANCE: DYNAMIC, REHAB EVAL
Bill For Surgical Tray: no
Additional Anesthesia Volume In Cc (Will Not Render If 0): 0
Dressing: bandage
Biopsy Method: 15 blade
Size Of Lesion In Cm: 0.3
Biopsy Type: H and E
Billing Type: Third-Party Bill
Cryotherapy Text: The wound bed was treated with cryotherapy after the biopsy was performed.
Wound Care: Aquaphor
Anesthesia Volume In Cc: 0.2
Detail Level: Detailed
Post-Care Instructions: I reviewed with the patient in detail post-care instructions. Patient is to keep the biopsy site dry overnight, and then apply aquaphor twice daily until healed. Patient is advised to use soap and water to wash the area daily and change bandage daily for two-three days.
Was A Bandage Applied: Yes
Notification Instructions: Patient will be notified of biopsy results. However, patient instructed to call the office if not contacted within 2 weeks.
Electrodesiccation Text: The wound bed was treated with electrodesiccation after the biopsy was performed.
Silver Nitrate Text: The wound bed was treated with silver nitrate after the biopsy was performed.
Hemostasis: Drysol
Anesthesia Type: 1% lidocaine with epinephrine and a 1:10 solution of 8.4% sodium bicarbonate
Depth Of Biopsy: dermis
Electrodesiccation And Curettage Text: The wound bed was treated with electrodesiccation and curettage after the biopsy was performed.
Curettage Text: The wound bed was treated with curettage after the biopsy was performed.
Consent: Written consent was obtained and risks were reviewed including but not limited to scarring, infection, bleeding, scabbing, incomplete removal, nerve damage and allergy to anesthesia.
Type Of Destruction Used: Curettage
fair balance

## 2021-06-26 NOTE — PROGRESS NOTE ADULT - SUBJECTIVE AND OBJECTIVE BOX
CC:  Patient is a 76y old  Female who presents with a chief complaint of   SUBJECTIVE:     -no new complaints or issues at current time.    ROS:  all other review of systems are negative unless indicated above.    acetaminophen   Tablet .. 650 milliGRAM(s) Oral every 6 hours PRN  cefTRIAXone Injectable. 1000 milliGRAM(s) IV Push every 24 hours  dextrose 40% Gel 15 Gram(s) Oral once  dextrose 5%. 1000 milliLiter(s) IV Continuous <Continuous>  dextrose 5%. 1000 milliLiter(s) IV Continuous <Continuous>  dextrose 50% Injectable 25 Gram(s) IV Push once  dextrose 50% Injectable 12.5 Gram(s) IV Push once  dextrose 50% Injectable 25 Gram(s) IV Push once  glucagon  Injectable 1 milliGRAM(s) IntraMuscular once  heparin   Injectable 5000 Unit(s) SubCutaneous every 12 hours  insulin glargine Injectable (LANTUS) 10 Unit(s) SubCutaneous at bedtime  insulin lispro (ADMELOG) corrective regimen sliding scale   SubCutaneous three times a day before meals  insulin lispro (ADMELOG) corrective regimen sliding scale   SubCutaneous at bedtime  ondansetron Injectable 4 milliGRAM(s) IV Push every 6 hours PRN  senna 2 Tablet(s) Oral at bedtime PRN    T(C): 36.8 (06-26-21 @ 16:05), Max: 36.8 (06-26-21 @ 16:05)  HR: 95 (06-26-21 @ 16:05) (75 - 97)  BP: 108/84 (06-26-21 @ 16:05) (98/78 - 113/55)  RR: 18 (06-26-21 @ 16:05) (17 - 18)  SpO2: 96% (06-26-21 @ 16:05) (96% - 100%)    Constitutional: NAD.   HEENT: PERRL, EOMI, MMM.  Neck: Soft and supple, No carotid bruit, No JVD  Respiratory: Breath sounds are clear bilaterally, No wheezing, rales or rhonchi  Cardiovascular: S1 and S2, regular rate and rhythm, no murmur, rub or gallop.  Gastrointestinal: Bowel Sounds present, soft, nontender, nondistended, no guarding, no rebound, no mass.  Extremities: No peripheral edema  Vascular: 2+ peripheral pulses  Neurological: A/O x , no focal deficits  Musculoskeletal: 5/5 strength b/l upper and lower extremities  Skin:  no visible rashes.                         13.2   8.32  )-----------( 238      ( 25 Jun 2021 01:39 )             40.0       06-25    134<L>  |  101  |  23  ----------------------------<  378<H>  4.6   |  29  |  0.79    Ca    8.8      25 Jun 2021 01:39  Mg     1.9     06-25    TPro  6.9  /  Alb  2.9<L>  /  TBili  0.4  /  DBili  x   /  AST  25  /  ALT  19  /  AlkPhos  67  06-25

## 2021-06-26 NOTE — PHYSICAL THERAPY INITIAL EVALUATION ADULT - PERTINENT HX OF CURRENT PROBLEM, REHAB EVAL
76F.  hx dementia.  presented to ED 06/25/2021.  patient lives with her  (primary care giver).  he was admitted to .  afterward, patient was being watched by her daughter.  it appeared to her that she was more forgetful and confused then usual.  she also was brought to the ED for evaluation.  in ED, FS >400mg/dL.  UA (+) WBCs.  UCx and BCx x 2 sets obtained in ED.  given ceftriaxone, IVFs and 6 units of insulin.

## 2021-06-26 NOTE — PHYSICAL THERAPY INITIAL EVALUATION ADULT - GENERAL OBSERVATIONS, REHAB EVAL
Pt seen for 45min PT Eval. Pt s/p AMS, +UTI, CT head neg. Pt rec'd sitting in chair, +alarm, A&Ox1-2 confused, easily distracted.

## 2021-06-26 NOTE — PHYSICAL THERAPY INITIAL EVALUATION ADULT - ADDITIONAL COMMENTS
Pt lives in private home with  with 4 steps to enter. Pt reports being indep without AD for amb and however requires assist for some ADLs from .

## 2021-06-26 NOTE — PHYSICAL THERAPY INITIAL EVALUATION ADULT - DISCHARGE DISPOSITION, PT EVAL
subacute rehab. If pt DC home pt will require assist for all functional mobility and home PT for progressive strengthening, gait and balance training, and pt/family education./rehabilitation facility

## 2021-06-26 NOTE — PHYSICAL THERAPY INITIAL EVALUATION ADULT - PRECAUTIONS/LIMITATIONS, REHAB EVAL
CT HEAD: No acute intracranial hemorrhage, mass effect, or acute displaced calvarium fracture. Mild involutional and chronic microvascular ischemic gliotic changes. If there are new or persistent symptoms, consider further evaluation with MRI provided no contraindications./fall precautions

## 2021-06-27 PROCEDURE — 99232 SBSQ HOSP IP/OBS MODERATE 35: CPT

## 2021-06-27 RX ORDER — SODIUM CHLORIDE 9 MG/ML
1000 INJECTION, SOLUTION INTRAVENOUS
Refills: 0 | Status: DISCONTINUED | OUTPATIENT
Start: 2021-06-27 | End: 2021-07-02

## 2021-06-27 RX ORDER — GLUCAGON INJECTION, SOLUTION 0.5 MG/.1ML
1 INJECTION, SOLUTION SUBCUTANEOUS ONCE
Refills: 0 | Status: DISCONTINUED | OUTPATIENT
Start: 2021-06-27 | End: 2021-07-02

## 2021-06-27 RX ORDER — INSULIN GLARGINE 100 [IU]/ML
10 INJECTION, SOLUTION SUBCUTANEOUS AT BEDTIME
Refills: 0 | Status: DISCONTINUED | OUTPATIENT
Start: 2021-06-27 | End: 2021-07-02

## 2021-06-27 RX ORDER — POLYETHYLENE GLYCOL 3350 17 G/17G
17 POWDER, FOR SOLUTION ORAL DAILY
Refills: 0 | Status: DISCONTINUED | OUTPATIENT
Start: 2021-06-27 | End: 2021-07-02

## 2021-06-27 RX ORDER — DEXTROSE 50 % IN WATER 50 %
25 SYRINGE (ML) INTRAVENOUS ONCE
Refills: 0 | Status: DISCONTINUED | OUTPATIENT
Start: 2021-06-27 | End: 2021-07-02

## 2021-06-27 RX ORDER — INSULIN LISPRO 100/ML
VIAL (ML) SUBCUTANEOUS
Refills: 0 | Status: DISCONTINUED | OUTPATIENT
Start: 2021-06-27 | End: 2021-07-02

## 2021-06-27 RX ORDER — INSULIN LISPRO 100/ML
3 VIAL (ML) SUBCUTANEOUS
Refills: 0 | Status: DISCONTINUED | OUTPATIENT
Start: 2021-06-27 | End: 2021-07-02

## 2021-06-27 RX ORDER — DEXTROSE 50 % IN WATER 50 %
12.5 SYRINGE (ML) INTRAVENOUS ONCE
Refills: 0 | Status: DISCONTINUED | OUTPATIENT
Start: 2021-06-27 | End: 2021-07-02

## 2021-06-27 RX ORDER — DEXTROSE 50 % IN WATER 50 %
15 SYRINGE (ML) INTRAVENOUS ONCE
Refills: 0 | Status: DISCONTINUED | OUTPATIENT
Start: 2021-06-27 | End: 2021-07-02

## 2021-06-27 RX ORDER — ZINC OXIDE 200 MG/G
1 OINTMENT TOPICAL DAILY
Refills: 0 | Status: DISCONTINUED | OUTPATIENT
Start: 2021-06-27 | End: 2021-07-02

## 2021-06-27 RX ORDER — HYDROCORTISONE 1 %
1 OINTMENT (GRAM) TOPICAL DAILY
Refills: 0 | Status: DISCONTINUED | OUTPATIENT
Start: 2021-06-27 | End: 2021-07-02

## 2021-06-27 RX ORDER — INSULIN LISPRO 100/ML
VIAL (ML) SUBCUTANEOUS AT BEDTIME
Refills: 0 | Status: DISCONTINUED | OUTPATIENT
Start: 2021-06-27 | End: 2021-07-02

## 2021-06-27 RX ADMIN — INSULIN GLARGINE 10 UNIT(S): 100 INJECTION, SOLUTION SUBCUTANEOUS at 23:31

## 2021-06-27 RX ADMIN — Medication 10: at 12:05

## 2021-06-27 RX ADMIN — Medication 1 SUPPOSITORY(S): at 17:28

## 2021-06-27 RX ADMIN — POLYETHYLENE GLYCOL 3350 17 GRAM(S): 17 POWDER, FOR SOLUTION ORAL at 12:20

## 2021-06-27 RX ADMIN — Medication 4: at 07:43

## 2021-06-27 RX ADMIN — SENNA PLUS 2 TABLET(S): 8.6 TABLET ORAL at 23:31

## 2021-06-27 RX ADMIN — HEPARIN SODIUM 5000 UNIT(S): 5000 INJECTION INTRAVENOUS; SUBCUTANEOUS at 09:17

## 2021-06-27 RX ADMIN — Medication 81 MILLIGRAM(S): at 09:16

## 2021-06-27 RX ADMIN — ATORVASTATIN CALCIUM 20 MILLIGRAM(S): 80 TABLET, FILM COATED ORAL at 23:30

## 2021-06-27 RX ADMIN — Medication 2: at 16:45

## 2021-06-27 RX ADMIN — HEPARIN SODIUM 5000 UNIT(S): 5000 INJECTION INTRAVENOUS; SUBCUTANEOUS at 23:30

## 2021-06-27 RX ADMIN — ZINC OXIDE 1 APPLICATION(S): 200 OINTMENT TOPICAL at 17:28

## 2021-06-27 RX ADMIN — CEFTRIAXONE 1000 MILLIGRAM(S): 500 INJECTION, POWDER, FOR SOLUTION INTRAMUSCULAR; INTRAVENOUS at 05:53

## 2021-06-27 NOTE — PROGRESS NOTE ADULT - ASSESSMENT
76F.  hx dementia.  presented to ED 06/25/2021.  patient lives with her  (primary care giver).  he was admitted to .  afterward, patient was being watched by her daughter.  it appeared to her that she was more forgetful and confused then usual.  she also was brought to the ED for evaluation.  in ED, FS >400mg/dL.  UA (+) WBCs.  UCx and BCx x 2 sets obtained in ED.  given ceftriaxone, IVFs and 6 units of insulin.    PMHx:  LBBB;  CVA;  DM;  breast CA; dementia.    constipation.  - bowel regimen.  - HC [place].  - CRSx:  patient placed her finger up her rectum resulting in some bleeding.    UTI.  - UCx:  pending.  - BCx:  pending.  - ceftriaxone (d3).    hx DM.  - uncontrolled.  - A1C 13.4%.  - consistent CHO diet.  - added Lantus 10units sq qhs.  - add lispro 3units sq qac.  - correction insulin coverage.    hx CVA.  - lipid panel.  - notably patient is not rx'd AP or ST.  - add ASA 81mg po qd and atorvastatin 20m po qhs.    debility.  - PT.    DVT prophylaxis.  - UFH sq.    disposition.  - [facility].  - CM, re:  DC planning and placement.    communication.  - ED RN. 76F.  hx dementia.  presented to ED 06/25/2021.  patient lives with her  (primary care giver).  he was admitted to .  afterward, patient was being watched by her daughter.  it appeared to her that she was more forgetful and confused then usual.  she also was brought to the ED for evaluation.  in ED, FS >400mg/dL.  UA (+) WBCs.  UCx and BCx x 2 sets obtained in ED.  given ceftriaxone, IVFs and 6 units of insulin.    PMHx:  LBBB;  CVA;  DM;  breast CA; dementia.    constipation.  - bowel regimen.  - HC MI.  - CRSx:  patient placed her finger up her rectum resulting in some bleeding.    UTI.  - UCx:  100K Enterobacter cloacae.  - BCx:  no growth.  - ceftriaxone (d3).    hx DM.  - uncontrolled.  - A1C 13.4%.  - consistent CHO diet.  - added Lantus 10units sq qhs.  - add lispro 3units sq qac.  - correction insulin coverage.    hx CVA.  - lipid panel.  - notably patient is not rx'd AP or ST.  - add ASA 81mg po qd and atorvastatin 20m po qhs.    debility.  - PT.    DVT prophylaxis.  - UFH sq.    disposition.  - [facility].  - CM, re:  DC planning and placement.    communication.  - ED RN.

## 2021-06-27 NOTE — PROGRESS NOTE ADULT - SUBJECTIVE AND OBJECTIVE BOX
CC:  Patient is a 76y old  Female who presents with a chief complaint of   SUBJECTIVE:     -no new complaints or issues at current time.    ROS:  all other review of systems are negative unless indicated above.    acetaminophen   Tablet .. 650 milliGRAM(s) Oral every 6 hours PRN  aspirin  chewable 81 milliGRAM(s) Oral daily  atorvastatin 20 milliGRAM(s) Oral at bedtime  cefTRIAXone Injectable. 1000 milliGRAM(s) IV Push every 24 hours  dextrose 40% Gel 15 Gram(s) Oral once  dextrose 5%. 1000 milliLiter(s) IV Continuous <Continuous>  dextrose 5%. 1000 milliLiter(s) IV Continuous <Continuous>  dextrose 50% Injectable 25 Gram(s) IV Push once  dextrose 50% Injectable 12.5 Gram(s) IV Push once  dextrose 50% Injectable 25 Gram(s) IV Push once  glucagon  Injectable 1 milliGRAM(s) IntraMuscular once  heparin   Injectable 5000 Unit(s) SubCutaneous every 12 hours  hydrocortisone hemorrhoidal Suppository 1 Suppository(s) Rectal daily PRN  insulin glargine Injectable (LANTUS) 10 Unit(s) SubCutaneous at bedtime  insulin lispro (ADMELOG) corrective regimen sliding scale   SubCutaneous three times a day before meals  insulin lispro (ADMELOG) corrective regimen sliding scale   SubCutaneous at bedtime  ondansetron Injectable 4 milliGRAM(s) IV Push every 6 hours PRN  polyethylene glycol 3350 17 Gram(s) Oral daily  senna 2 Tablet(s) Oral at bedtime PRN  zinc oxide 40% Ointment 1 Application(s) Topical daily PRN    T(C): 36.3 (06-27-21 @ 16:12), Max: 36.7 (06-26-21 @ 23:21)  HR: 70 (06-27-21 @ 16:12) (70 - 80)  BP: 128/60 (06-27-21 @ 16:12) (116/73 - 144/70)  RR: 18 (06-27-21 @ 16:12) (17 - 18)  SpO2: 98% (06-27-21 @ 16:12) (94% - 98%)    Constitutional: NAD.   HEENT: PERRL, EOMI, MMM.  Neck: Soft and supple, No carotid bruit, No JVD  Respiratory: Breath sounds are clear bilaterally, No wheezing, rales or rhonchi  Cardiovascular: S1 and S2, regular rate and rhythm, no murmur, rub or gallop.  Gastrointestinal: Bowel Sounds present, soft, nontender, nondistended, no guarding, no rebound, no mass.  Extremities: No peripheral edema  Vascular: 2+ peripheral pulses  Neurological: A/O x , no focal deficits  Musculoskeletal: 5/5 strength b/l upper and lower extremities  Skin:  no visible rashes.            CC:  Patient is a 76y old  Female who presents with a chief complaint of   SUBJECTIVE:     -no new complaints or issues at current time.    ROS:  all other review of systems are negative unless indicated above.    acetaminophen   Tablet .. 650 milliGRAM(s) Oral every 6 hours PRN  aspirin  chewable 81 milliGRAM(s) Oral daily  atorvastatin 20 milliGRAM(s) Oral at bedtime  cefTRIAXone Injectable. 1000 milliGRAM(s) IV Push every 24 hours  dextrose 40% Gel 15 Gram(s) Oral once  dextrose 5%. 1000 milliLiter(s) IV Continuous <Continuous>  dextrose 5%. 1000 milliLiter(s) IV Continuous <Continuous>  dextrose 50% Injectable 25 Gram(s) IV Push once  dextrose 50% Injectable 12.5 Gram(s) IV Push once  dextrose 50% Injectable 25 Gram(s) IV Push once  glucagon  Injectable 1 milliGRAM(s) IntraMuscular once  heparin   Injectable 5000 Unit(s) SubCutaneous every 12 hours  hydrocortisone hemorrhoidal Suppository 1 Suppository(s) Rectal daily PRN  insulin glargine Injectable (LANTUS) 10 Unit(s) SubCutaneous at bedtime  insulin lispro (ADMELOG) corrective regimen sliding scale   SubCutaneous three times a day before meals  insulin lispro (ADMELOG) corrective regimen sliding scale   SubCutaneous at bedtime  ondansetron Injectable 4 milliGRAM(s) IV Push every 6 hours PRN  polyethylene glycol 3350 17 Gram(s) Oral daily  senna 2 Tablet(s) Oral at bedtime PRN  zinc oxide 40% Ointment 1 Application(s) Topical daily PRN    T(C): 36.3 (06-27-21 @ 16:12), Max: 36.7 (06-26-21 @ 23:21)  HR: 70 (06-27-21 @ 16:12) (70 - 80)  BP: 128/60 (06-27-21 @ 16:12) (116/73 - 144/70)  RR: 18 (06-27-21 @ 16:12) (17 - 18)  SpO2: 98% (06-27-21 @ 16:12) (94% - 98%)    Constitutional: NAD.   HEENT: PERRL, EOMI, MMM.  Neck: Soft and supple, No carotid bruit, No JVD  Respiratory: Breath sounds are clear bilaterally, No wheezing, rales or rhonchi  Cardiovascular: S1 and S2, regular rate and rhythm, no murmur, rub or gallop.  Gastrointestinal: Bowel Sounds present, soft, nontender, nondistended, no guarding, no rebound, no mass.  Extremities: No peripheral edema  Vascular: 2+ peripheral pulses  Neurological: A/O x , no focal deficits  Musculoskeletal: 5/5 strength b/l upper and lower extremities  Skin:  no visible rashes.       Culture Results:   >100,000 CFU/ml Enterobacter cloacae complex (06.25.21 @ 02:32)

## 2021-06-28 LAB
-  AMIKACIN: SIGNIFICANT CHANGE UP
-  AMOXICILLIN/CLAVULANIC ACID: SIGNIFICANT CHANGE UP
-  AMPICILLIN/SULBACTAM: SIGNIFICANT CHANGE UP
-  AMPICILLIN: SIGNIFICANT CHANGE UP
-  AZTREONAM: SIGNIFICANT CHANGE UP
-  CEFAZOLIN: SIGNIFICANT CHANGE UP
-  CEFEPIME: SIGNIFICANT CHANGE UP
-  CEFOXITIN: SIGNIFICANT CHANGE UP
-  CEFTRIAXONE: SIGNIFICANT CHANGE UP
-  CIPROFLOXACIN: SIGNIFICANT CHANGE UP
-  ERTAPENEM: SIGNIFICANT CHANGE UP
-  GENTAMICIN: SIGNIFICANT CHANGE UP
-  IMIPENEM: SIGNIFICANT CHANGE UP
-  LEVOFLOXACIN: SIGNIFICANT CHANGE UP
-  MEROPENEM: SIGNIFICANT CHANGE UP
-  NITROFURANTOIN: SIGNIFICANT CHANGE UP
-  PIPERACILLIN/TAZOBACTAM: SIGNIFICANT CHANGE UP
-  TIGECYCLINE: SIGNIFICANT CHANGE UP
-  TOBRAMYCIN: SIGNIFICANT CHANGE UP
-  TRIMETHOPRIM/SULFAMETHOXAZOLE: SIGNIFICANT CHANGE UP
ANION GAP SERPL CALC-SCNC: 5 MMOL/L — SIGNIFICANT CHANGE UP (ref 5–17)
BUN SERPL-MCNC: 16 MG/DL — SIGNIFICANT CHANGE UP (ref 7–23)
CALCIUM SERPL-MCNC: 8.8 MG/DL — SIGNIFICANT CHANGE UP (ref 8.5–10.1)
CHLORIDE SERPL-SCNC: 108 MMOL/L — SIGNIFICANT CHANGE UP (ref 96–108)
CO2 SERPL-SCNC: 26 MMOL/L — SIGNIFICANT CHANGE UP (ref 22–31)
CREAT SERPL-MCNC: 0.54 MG/DL — SIGNIFICANT CHANGE UP (ref 0.5–1.3)
CULTURE RESULTS: SIGNIFICANT CHANGE UP
GLUCOSE SERPL-MCNC: 209 MG/DL — HIGH (ref 70–99)
HCT VFR BLD CALC: 42.8 % — SIGNIFICANT CHANGE UP (ref 34.5–45)
HGB BLD-MCNC: 14.2 G/DL — SIGNIFICANT CHANGE UP (ref 11.5–15.5)
MCHC RBC-ENTMCNC: 30.8 PG — SIGNIFICANT CHANGE UP (ref 27–34)
MCHC RBC-ENTMCNC: 33.2 GM/DL — SIGNIFICANT CHANGE UP (ref 32–36)
MCV RBC AUTO: 92.8 FL — SIGNIFICANT CHANGE UP (ref 80–100)
METHOD TYPE: SIGNIFICANT CHANGE UP
ORGANISM # SPEC MICROSCOPIC CNT: SIGNIFICANT CHANGE UP
ORGANISM # SPEC MICROSCOPIC CNT: SIGNIFICANT CHANGE UP
PLATELET # BLD AUTO: 238 K/UL — SIGNIFICANT CHANGE UP (ref 150–400)
POTASSIUM SERPL-MCNC: 4 MMOL/L — SIGNIFICANT CHANGE UP (ref 3.5–5.3)
POTASSIUM SERPL-SCNC: 4 MMOL/L — SIGNIFICANT CHANGE UP (ref 3.5–5.3)
RBC # BLD: 4.61 M/UL — SIGNIFICANT CHANGE UP (ref 3.8–5.2)
RBC # FLD: 13 % — SIGNIFICANT CHANGE UP (ref 10.3–14.5)
SODIUM SERPL-SCNC: 139 MMOL/L — SIGNIFICANT CHANGE UP (ref 135–145)
SPECIMEN SOURCE: SIGNIFICANT CHANGE UP
WBC # BLD: 9.18 K/UL — SIGNIFICANT CHANGE UP (ref 3.8–10.5)
WBC # FLD AUTO: 9.18 K/UL — SIGNIFICANT CHANGE UP (ref 3.8–10.5)

## 2021-06-28 PROCEDURE — 99232 SBSQ HOSP IP/OBS MODERATE 35: CPT

## 2021-06-28 RX ADMIN — Medication 1 SUPPOSITORY(S): at 16:06

## 2021-06-28 RX ADMIN — ATORVASTATIN CALCIUM 20 MILLIGRAM(S): 80 TABLET, FILM COATED ORAL at 22:32

## 2021-06-28 RX ADMIN — Medication 2: at 08:04

## 2021-06-28 RX ADMIN — POLYETHYLENE GLYCOL 3350 17 GRAM(S): 17 POWDER, FOR SOLUTION ORAL at 09:27

## 2021-06-28 RX ADMIN — Medication 81 MILLIGRAM(S): at 09:27

## 2021-06-28 RX ADMIN — HEPARIN SODIUM 5000 UNIT(S): 5000 INJECTION INTRAVENOUS; SUBCUTANEOUS at 09:27

## 2021-06-28 RX ADMIN — HEPARIN SODIUM 5000 UNIT(S): 5000 INJECTION INTRAVENOUS; SUBCUTANEOUS at 22:32

## 2021-06-28 RX ADMIN — INSULIN GLARGINE 10 UNIT(S): 100 INJECTION, SOLUTION SUBCUTANEOUS at 22:41

## 2021-06-28 RX ADMIN — Medication 3 UNIT(S): at 08:04

## 2021-06-28 RX ADMIN — Medication 0: at 22:41

## 2021-06-28 RX ADMIN — Medication 3 UNIT(S): at 16:50

## 2021-06-28 RX ADMIN — Medication 2: at 16:51

## 2021-06-28 RX ADMIN — Medication 2: at 12:41

## 2021-06-28 RX ADMIN — CEFTRIAXONE 1000 MILLIGRAM(S): 500 INJECTION, POWDER, FOR SOLUTION INTRAMUSCULAR; INTRAVENOUS at 05:35

## 2021-06-28 RX ADMIN — Medication 3 UNIT(S): at 12:41

## 2021-06-28 NOTE — PROGRESS NOTE ADULT - SUBJECTIVE AND OBJECTIVE BOX
CC:  Patient is a 76y old  Female who presents with a chief complaint of   SUBJECTIVE:     -no new complaints or issues at current time.    ROS:  all other review of systems are negative unless indicated above.    acetaminophen   Tablet .. 650 milliGRAM(s) Oral every 6 hours PRN  aspirin  chewable 81 milliGRAM(s) Oral daily  atorvastatin 20 milliGRAM(s) Oral at bedtime  dextrose 40% Gel 15 Gram(s) Oral once  dextrose 5%. 1000 milliLiter(s) IV Continuous <Continuous>  dextrose 5%. 1000 milliLiter(s) IV Continuous <Continuous>  dextrose 50% Injectable 25 Gram(s) IV Push once  dextrose 50% Injectable 12.5 Gram(s) IV Push once  dextrose 50% Injectable 25 Gram(s) IV Push once  glucagon  Injectable 1 milliGRAM(s) IntraMuscular once  heparin   Injectable 5000 Unit(s) SubCutaneous every 12 hours  hydrocortisone hemorrhoidal Suppository 1 Suppository(s) Rectal daily PRN  insulin glargine Injectable (LANTUS) 10 Unit(s) SubCutaneous at bedtime  insulin lispro (ADMELOG) corrective regimen sliding scale   SubCutaneous three times a day before meals  insulin lispro (ADMELOG) corrective regimen sliding scale   SubCutaneous at bedtime  insulin lispro Injectable (ADMELOG) 3 Unit(s) SubCutaneous three times a day before meals  ondansetron Injectable 4 milliGRAM(s) IV Push every 6 hours PRN  polyethylene glycol 3350 17 Gram(s) Oral daily  senna 2 Tablet(s) Oral at bedtime PRN  zinc oxide 40% Ointment 1 Application(s) Topical daily PRN    T(C): 37.2 (06-28-21 @ 15:46), Max: 37.2 (06-27-21 @ 23:56)  HR: 78 (06-28-21 @ 15:46) (78 - 92)  BP: 109/55 (06-28-21 @ 15:46) (109/55 - 115/83)  RR: 18 (06-28-21 @ 15:46) (18 - 18)  SpO2: 97% (06-28-21 @ 15:46) (94% - 97%)    Constitutional: NAD.   HEENT: PERRL, EOMI, MMM.  Neck: Soft and supple, No carotid bruit, No JVD  Respiratory: Breath sounds are clear bilaterally, No wheezing, rales or rhonchi  Cardiovascular: S1 and S2, regular rate and rhythm, no murmur, rub or gallop.  Gastrointestinal: Bowel Sounds present, soft, nontender, nondistended, no guarding, no rebound, no mass.  Extremities: No peripheral edema  Vascular: 2+ peripheral pulses  Neurological: A/O x , no focal deficits  Musculoskeletal: 5/5 strength b/l upper and lower extremities  Skin:  no visible rashes.                         14.2   9.18  )-----------( 238      ( 28 Jun 2021 11:58 )             42.8       06-28    139  |  108  |  16  ----------------------------<  209<H>  4.0   |  26  |  0.54    Ca    8.8      28 Jun 2021 11:58

## 2021-06-28 NOTE — PROGRESS NOTE ADULT - ASSESSMENT
76F.  hx dementia.  presented to ED 06/25/2021.  patient lives with her  (primary care giver).  he was admitted to .  afterward, patient was being watched by her daughter.  it appeared to her that she was more forgetful and confused then usual.  she also was brought to the ED for evaluation.  in ED, FS >400mg/dL.  UA (+) WBCs.  UCx and BCx x 2 sets obtained in ED.  given ceftriaxone, IVFs and 6 units of insulin.    PMHx:  LBBB;  CVA;  DM;  breast CA; dementia.    constipation.  - bowel regimen.  - HC HI.  - CRSx:  patient placed her finger up her rectum resulting in some bleeding.    UTI.  - UCx:  100K Enterobacter cloacae.  - BCx:  no growth.  - completed ceftriaxone.    hx DM.  - improved.  - A1C 13.4%.  - consistent CHO diet.  - Lantus 10units sq qhs.  - lispro 3units sq qac.  - correction insulin coverage.    hx CVA.  - ASA 81mg po qd.  - atorvastatin 20m po qhs.    debility.  - PT.    DVT prophylaxis.  - UFH sq.    disposition.  - general medical [facility].  - CM, re:  DC planning and placement.    communication.  - 5E RN.

## 2021-06-29 LAB — TSH SERPL-MCNC: 1.22 UU/ML — SIGNIFICANT CHANGE UP (ref 0.34–4.82)

## 2021-06-29 PROCEDURE — 99232 SBSQ HOSP IP/OBS MODERATE 35: CPT

## 2021-06-29 RX ADMIN — Medication 3 UNIT(S): at 12:06

## 2021-06-29 RX ADMIN — Medication 4: at 07:45

## 2021-06-29 RX ADMIN — HEPARIN SODIUM 5000 UNIT(S): 5000 INJECTION INTRAVENOUS; SUBCUTANEOUS at 21:18

## 2021-06-29 RX ADMIN — HEPARIN SODIUM 5000 UNIT(S): 5000 INJECTION INTRAVENOUS; SUBCUTANEOUS at 09:38

## 2021-06-29 RX ADMIN — Medication 8: at 12:06

## 2021-06-29 RX ADMIN — Medication 3 UNIT(S): at 07:46

## 2021-06-29 RX ADMIN — INSULIN GLARGINE 10 UNIT(S): 100 INJECTION, SOLUTION SUBCUTANEOUS at 21:19

## 2021-06-29 RX ADMIN — Medication 81 MILLIGRAM(S): at 09:38

## 2021-06-29 RX ADMIN — ATORVASTATIN CALCIUM 20 MILLIGRAM(S): 80 TABLET, FILM COATED ORAL at 21:18

## 2021-06-29 RX ADMIN — Medication 3 UNIT(S): at 16:27

## 2021-06-29 RX ADMIN — Medication 4: at 16:27

## 2021-06-29 RX ADMIN — Medication 10 MILLIGRAM(S): at 16:27

## 2021-06-29 RX ADMIN — POLYETHYLENE GLYCOL 3350 17 GRAM(S): 17 POWDER, FOR SOLUTION ORAL at 09:37

## 2021-06-29 NOTE — PROGRESS NOTE ADULT - SUBJECTIVE AND OBJECTIVE BOX
CC:  Patient is a 76y old  Female who presents with a chief complaint of   SUBJECTIVE:     -no new complaints or issues at current time.  6/29/21: sitting up in chair, no new issues or complaints     ROS:  all other review of systems are negative unless indicated above.    acetaminophen   Tablet .. 650 milliGRAM(s) Oral every 6 hours PRN  aspirin  chewable 81 milliGRAM(s) Oral daily  atorvastatin 20 milliGRAM(s) Oral at bedtime  dextrose 40% Gel 15 Gram(s) Oral once  dextrose 5%. 1000 milliLiter(s) IV Continuous <Continuous>  dextrose 5%. 1000 milliLiter(s) IV Continuous <Continuous>  dextrose 50% Injectable 25 Gram(s) IV Push once  dextrose 50% Injectable 12.5 Gram(s) IV Push once  dextrose 50% Injectable 25 Gram(s) IV Push once  glucagon  Injectable 1 milliGRAM(s) IntraMuscular once  heparin   Injectable 5000 Unit(s) SubCutaneous every 12 hours  hydrocortisone hemorrhoidal Suppository 1 Suppository(s) Rectal daily PRN  insulin glargine Injectable (LANTUS) 10 Unit(s) SubCutaneous at bedtime  insulin lispro (ADMELOG) corrective regimen sliding scale   SubCutaneous three times a day before meals  insulin lispro (ADMELOG) corrective regimen sliding scale   SubCutaneous at bedtime  insulin lispro Injectable (ADMELOG) 3 Unit(s) SubCutaneous three times a day before meals  ondansetron Injectable 4 milliGRAM(s) IV Push every 6 hours PRN  polyethylene glycol 3350 17 Gram(s) Oral daily  senna 2 Tablet(s) Oral at bedtime PRN  zinc oxide 40% Ointment 1 Application(s) Topical daily PRN    Vital Signs Last 24 Hrs  T(C): 36.3 (29 Jun 2021 07:40), Max: 37.4 (28 Jun 2021 23:55)  T(F): 97.4 (29 Jun 2021 07:40), Max: 99.3 (28 Jun 2021 23:55)  HR: 89 (29 Jun 2021 07:40) (78 - 92)  BP: 125/61 (29 Jun 2021 07:40) (108/75 - 125/61)  BP(mean): --  RR: 18 (29 Jun 2021 07:40) (18 - 18)  SpO2: 94% (29 Jun 2021 07:40) (94% - 97%)    Constitutional: NAD.   HEENT: PERRL, EOMI, MMM.  Neck: Soft and supple, No carotid bruit, No JVD  Respiratory: Breath sounds are clear bilaterally, No wheezing, rales or rhonchi  Cardiovascular: S1 and S2, regular rate and rhythm, no murmur, rub or gallop.  Gastrointestinal: Bowel Sounds present, soft, nontender, nondistended, no guarding, no rebound, no mass.  Extremities: No peripheral edema  Vascular: 2+ peripheral pulses  Neurological: A/O x2 , no focal deficits  Musculoskeletal: 5/5 strength b/l upper and lower extremities  Skin:  no visible rashes.                         14.2   9.18  )-----------( 238      ( 28 Jun 2021 11:58 )             42.8       06-28    139  |  108  |  16  ----------------------------<  209<H>  4.0   |  26  |  0.54    Ca    8.8      28 Jun 2021 11:58

## 2021-06-29 NOTE — PROVIDER CONTACT NOTE (OTHER) - SITUATION
Informed Annie at md office of consult
Patient is a noncompliant diabetic admitted for UTI.
Tele Colorectal Surgical Team to advise of consult. Left message on machine.
Tele MD service spoke with Johnson to advise MD of routine consult.
notified Dr Yeboah's office of admission

## 2021-06-29 NOTE — PROGRESS NOTE ADULT - ASSESSMENT
76F.  hx dementia.  presented to ED 06/25/2021.  patient lives with her  (primary care giver).  he was admitted to .  afterward, patient was being watched by her daughter.  it appeared to her that she was more forgetful and confused then usual.  she also was brought to the ED for evaluation.  in ED, FS >400mg/dL.  UA (+) WBCs.  UCx and BCx x 2 sets obtained in ED.  given ceftriaxone, IVFs and 6 units of insulin.    PMHx:  LBBB;  CVA;  DM;  breast CA; dementia.    constipation.  - bowel regimen.  - HC GA.  - CRSx:  patient placed her finger up her rectum resulting in some bleeding.    UTI.  - UCx:  100K Enterobacter cloacae.  - BCx:  no growth.  - completed ceftriaxone.    hx DM.  - improved.  - A1C 13.4%.  - consistent CHO diet.  - Lantus 10units sq qhs.  - lispro 3units sq qac.  - correction insulin coverage.    hx CVA.  - ASA 81mg po qd.  - atorvastatin 20m po qhs.    debility.  - PT.    DVT prophylaxis.  - UFH sq.    disposition.  - general medical [facility].  - CM, re:  DC planning and placement.    communication.  - 5E RN.

## 2021-06-30 LAB
CULTURE RESULTS: SIGNIFICANT CHANGE UP
CULTURE RESULTS: SIGNIFICANT CHANGE UP
SPECIMEN SOURCE: SIGNIFICANT CHANGE UP
SPECIMEN SOURCE: SIGNIFICANT CHANGE UP

## 2021-06-30 PROCEDURE — 93880 EXTRACRANIAL BILAT STUDY: CPT | Mod: 26

## 2021-06-30 PROCEDURE — 93306 TTE W/DOPPLER COMPLETE: CPT | Mod: 26

## 2021-06-30 PROCEDURE — 99232 SBSQ HOSP IP/OBS MODERATE 35: CPT

## 2021-06-30 RX ORDER — NYSTATIN CREAM 100000 [USP'U]/G
1 CREAM TOPICAL
Refills: 0 | Status: DISCONTINUED | OUTPATIENT
Start: 2021-06-30 | End: 2021-07-02

## 2021-06-30 RX ORDER — FLUCONAZOLE 150 MG/1
100 TABLET ORAL ONCE
Refills: 0 | Status: COMPLETED | OUTPATIENT
Start: 2021-06-30 | End: 2021-06-30

## 2021-06-30 RX ADMIN — NYSTATIN CREAM 1 APPLICATION(S): 100000 CREAM TOPICAL at 21:41

## 2021-06-30 RX ADMIN — Medication 10 MILLIGRAM(S): at 10:12

## 2021-06-30 RX ADMIN — Medication 81 MILLIGRAM(S): at 10:12

## 2021-06-30 RX ADMIN — Medication 2: at 07:38

## 2021-06-30 RX ADMIN — INSULIN GLARGINE 10 UNIT(S): 100 INJECTION, SOLUTION SUBCUTANEOUS at 21:40

## 2021-06-30 RX ADMIN — Medication 3 UNIT(S): at 16:44

## 2021-06-30 RX ADMIN — HEPARIN SODIUM 5000 UNIT(S): 5000 INJECTION INTRAVENOUS; SUBCUTANEOUS at 10:12

## 2021-06-30 RX ADMIN — ATORVASTATIN CALCIUM 20 MILLIGRAM(S): 80 TABLET, FILM COATED ORAL at 21:39

## 2021-06-30 RX ADMIN — Medication 3 UNIT(S): at 12:12

## 2021-06-30 RX ADMIN — HEPARIN SODIUM 5000 UNIT(S): 5000 INJECTION INTRAVENOUS; SUBCUTANEOUS at 21:40

## 2021-06-30 RX ADMIN — FLUCONAZOLE 100 MILLIGRAM(S): 150 TABLET ORAL at 16:43

## 2021-06-30 RX ADMIN — Medication 3 UNIT(S): at 07:38

## 2021-06-30 RX ADMIN — Medication 6: at 12:11

## 2021-06-30 RX ADMIN — POLYETHYLENE GLYCOL 3350 17 GRAM(S): 17 POWDER, FOR SOLUTION ORAL at 10:12

## 2021-06-30 RX ADMIN — Medication 8: at 16:43

## 2021-06-30 NOTE — PROGRESS NOTE ADULT - SUBJECTIVE AND OBJECTIVE BOX
CC:  Patient is a 76y old  Female who presents with a chief complaint of   SUBJECTIVE:     -no new complaints or issues at current time.  6/29/21: sitting up in chair, no new issues or complaints.  06/30/21: Patient seen and examined. Seeing by PT. No new issues. Discussed with  Dr TITA Lea. No inpatient EGD/colonoscopy    ROS:  all other review of systems are negative unless indicated above.      Vital Signs Last 24 Hrs  T(C): 36.8 (29 Jun 2021 21:33), Max: 36.9 (29 Jun 2021 16:01)  T(F): 98.3 (29 Jun 2021 21:33), Max: 98.4 (29 Jun 2021 16:01)  HR: 83 (29 Jun 2021 21:33) (83 - 86)  BP: 100/62 (30 Jun 2021 08:05) (100/62 - 118/56)  BP(mean): --  RR: 18 (29 Jun 2021 21:33) (18 - 18)  SpO2: 93% (29 Jun 2021 21:33) (93% - 94%)        Constitutional: NAD.   HEENT: PERRL, EOMI, MMM.  Neck: Soft and supple, No carotid bruit, No JVD  Respiratory: Breath sounds are clear bilaterally, No wheezing, rales or rhonchi  Cardiovascular: S1 and S2, regular rate and rhythm, no murmur, rub or gallop.  Gastrointestinal: Bowel Sounds present, soft, nontender, nondistended, no guarding, no rebound, no mass.  Extremities: No peripheral edema  Vascular: 2+ peripheral pulses  Neurological: A/O x2 , no focal deficits  Musculoskeletal: 5/5 strength b/l upper and lower extremities  Skin:  no visible rashes.                              CAPILLARY BLOOD GLUCOSE      POCT Blood Glucose.: 300 mg/dL (30 Jun 2021 12:11)  POCT Blood Glucose.: 169 mg/dL (30 Jun 2021 07:37)  POCT Blood Glucose.: 186 mg/dL (29 Jun 2021 21:13)  POCT Blood Glucose.: 238 mg/dL (29 Jun 2021 16:25)

## 2021-06-30 NOTE — CONSULT NOTE ADULT - SUBJECTIVE AND OBJECTIVE BOX
Patient is a 76y old  Female who presents with a chief complaint of confusion.    HPI:  76F.  hx dementia.  presented to ED 06/25/2021.  patient lives with her  (primary care giver).  he was admitted to .  afterward, patient was being watched by her daughter.  it appeared to her that she was more forgetful and confused then usual.  she also was brought to the ED for evaluation.  in ED, FS >400mg/dL.  UA (+) WBCs.  UCx and BCx x 2 sets obtained in ED.  given ceftriaxone, IVFs and 6 units of insulin.    6/30  she is resting comfortably.  no complaints of chest pain or shortness of breath.    PMHx:  LBBB;  CVA;  DM;  breast CA; dementia.      PAST MEDICAL & SURGICAL HISTORY:  DM (diabetes mellitus)  type 2, not on meds    Breast cancer    Cerebrovascular accident (CVA)    Dementia    LBBB (left bundle branch block)    Carotid stent occlusion      MEDICATIONS  (STANDING):  aspirin  chewable 81 milliGRAM(s) Oral daily  atorvastatin 20 milliGRAM(s) Oral at bedtime  bisacodyl Suppository 10 milliGRAM(s) Rectal daily  dextrose 40% Gel 15 Gram(s) Oral once  dextrose 5%. 1000 milliLiter(s) (50 mL/Hr) IV Continuous <Continuous>  dextrose 5%. 1000 milliLiter(s) (100 mL/Hr) IV Continuous <Continuous>  dextrose 50% Injectable 25 Gram(s) IV Push once  dextrose 50% Injectable 12.5 Gram(s) IV Push once  dextrose 50% Injectable 25 Gram(s) IV Push once  glucagon  Injectable 1 milliGRAM(s) IntraMuscular once  heparin   Injectable 5000 Unit(s) SubCutaneous every 12 hours  insulin glargine Injectable (LANTUS) 10 Unit(s) SubCutaneous at bedtime  insulin lispro (ADMELOG) corrective regimen sliding scale   SubCutaneous three times a day before meals  insulin lispro (ADMELOG) corrective regimen sliding scale   SubCutaneous at bedtime  insulin lispro Injectable (ADMELOG) 3 Unit(s) SubCutaneous three times a day before meals  polyethylene glycol 3350 17 Gram(s) Oral daily    MEDICATIONS  (PRN):  acetaminophen   Tablet .. 650 milliGRAM(s) Oral every 6 hours PRN Mild Pain (1 - 3)  hydrocortisone hemorrhoidal Suppository 1 Suppository(s) Rectal daily PRN rectal pain  ondansetron Injectable 4 milliGRAM(s) IV Push every 6 hours PRN Nausea and/or Vomiting  senna 2 Tablet(s) Oral at bedtime PRN Constipation  zinc oxide 40% Ointment 1 Application(s) Topical daily PRN irritation    Allergies    No Known Allergies    Intolerances      FAMILY HISTORY:  No pertinent family history  of cancer          REVIEW OF SYSTEMS:    CONSTITUTIONAL: No weakness, fevers or chills  EYES/ENT: No visual changes;  No vertigo or throat pain   NECK: No pain or stiffness  RESPIRATORY: No cough, wheezing, hemoptysis; No shortness of breath  CARDIOVASCULAR: No chest pain or palpitations  GASTROINTESTINAL: No abdominal or epigastric pain. No nausea, vomiting, or hematemesis; No diarrhea or constipation. No melena or hematochezia.  GENITOURINARY: No dysuria, frequency or hematuria  NEUROLOGICAL: No numbness or weakness  SKIN: No itching, burning, rashes, or lesions   All other review of systems is negative unless indicated above      PHYSICAL EXAM:  Daily     Daily   Vital Signs Last 24 Hrs  T(C): 36.8 (29 Jun 2021 21:33), Max: 36.9 (29 Jun 2021 16:01)  T(F): 98.3 (29 Jun 2021 21:33), Max: 98.4 (29 Jun 2021 16:01)  HR: 83 (29 Jun 2021 21:33) (83 - 89)  BP: 118/56 (29 Jun 2021 21:33) (105/51 - 125/61)  BP(mean): --  RR: 18 (29 Jun 2021 21:33) (18 - 18)  SpO2: 93% (29 Jun 2021 21:33) (93% - 94%)    Constitutional: NAD, awake and alert, well-developed  HEENT: PERR, EOMI, Normal Hearing, MMM  Neck: Soft and supple, No LAD, No JVD  Respiratory: Breath sounds are clear bilaterally, No wheezing, rales or rhonchi  Cardiovascular: S1 and S2, regular rate and rhythm, no Murmurs, gallops or rubs  Gastrointestinal: Bowel Sounds present, soft, nontender, nondistended, no guarding, no rebound  Extremities: No peripheral edema  Vascular: 2+ peripheral pulses  Neurological: A/O x 3, no focal deficits  Musculoskeletal: 5/5 strength b/l upper and lower extremities  Skin: No rashes    LABS: All Labs Reviewed:                        14.2   9.18  )-----------( 238      ( 28 Jun 2021 11:58 )             42.8     06-28    139  |  108  |  16  ----------------------------<  209<H>  4.0   |  26  |  0.54    Ca    8.8      28 Jun 2021 11:58            Blood Culture:     EKG: NSR, LBBB    CARDIOLOGY TESTING:
Pt is a 77 YO F that presented to the ED due to increased confusion. Pts caretaker is her  but he was also admitted for confusion. Pt has a history of dementia at baseline. Pt     Vital Signs Last 24 Hrs  T(C): 37.2 (27 Jun 2021 23:56), Max: 37.2 (27 Jun 2021 23:56)  T(F): 98.9 (27 Jun 2021 23:56), Max: 98.9 (27 Jun 2021 23:56)  HR: 92 (27 Jun 2021 23:56) (70 - 92)  BP: 115/83 (27 Jun 2021 23:56) (115/83 - 128/60)  BP(mean): --  ABP: --  ABP(mean): --  RR: 18 (27 Jun 2021 16:12) (18 - 18)  SpO2: 94% (27 Jun 2021 23:56) (94% - 98%)    PE:   General: NAD,   Neck: Supple  Chest: Equal expansion bilaterally, equal breath sounds  CV: S1S2 Present  Abdomen: Soft, non distended, non-tender to palpation, non-tympanic, bowel sounds present  Extremities: Grossly symmetric  Butt: Excoriation of the butt    Labs:  None     
cc- pt confused and demented    HPI 76 yr old female with constipation chronic and rectal bleeding post digital rectal exam by hospital staff who presented with change in mental status and UTI on Abx  pt has not had follow up with any doctor for a number of years and no prior colonoscopy according to daughter   the pt has had sig alcohol history according to daughter Angela  she has had difficulty swallowing and choked recently   she has had no melena or wt loss or abd pain  she has had several small bm's since yesterday      PMHx cva, dementia, diabetes, heart murmur, breast cancer    Pshx- none reported    NKDA    Meds aspirin 81 mg daily and ceftriaxone   insulin, atorvastatin, miralax and senna     Fam hx n/a  soc hx prior etoh mod use reported by daughter  no tobacco    ros- limited secondary to dementia hx    PE NAD  VS RR 18  125/61, P 89  and temp 36.3  heent nc / at scl anicteric opm pink no lesiosn  neck supple  lungs clear  heart rrr   abd pos bs soft nontender , no hsm or mass, mildly distended  ext no edema  skin no jaudnice  neuro alert     complete Blood Count in AM (06.28.21 @ 11:58)   WBC Count: 9.18 K/uL   RBC Count: 4.61 M/uL   Hemoglobin: 14.2 g/dL   Hematocrit: 42.8 %   Mean Cell Volume: 92.8 fl   Mean Cell Hemoglobin: 30.8 pg   Mean Cell Hemoglobin Conc: 33.2 gm/dL   Red Cell Distrib Width: 13.0 %   Platelet Count - Automated: 238 K/uL Comprehensive Metabolic Panel (06.25.21 @ 01:39)   Sodium, Serum: 134 mmol/L   Potassium, Serum: 4.6 mmol/L   Chloride, Serum: 101 mmol/L   Carbon Dioxide, Serum: 29 mmol/L   Anion Gap, Serum: 4 mmol/L   Blood Urea Nitrogen, Serum: 23 mg/dL   Creatinine, Serum: 0.79 mg/dL   Glucose, Serum: 378 mg/dL   Calcium, Total Serum: 8.8 mg/dL   Protein Total, Serum: 6.9 gm/dL   Albumin, Serum: 2.9 g/dL   Bilirubin Total, Serum: 0.4 mg/dL   Alkaline Phosphatase, Serum: 67 U/L   Aspartate Aminotransferase (AST/SGOT): 25 U/L   Alanine Aminotransferase (ALT/SGPT): 19 U/L   eGFR if Non : 73: Interpretative comment   The units for eGFR are mL/min/1.73M2 (normalized body surface area). The   eGFR is calculated from a serum creatinine using the CKD-EPI equation.   Other variables required for calculation are race, age and sex. Among   patients with chronic kidney disease (CKD), the eGFR is useful in   determining the stage of disease according to KDOQI CKD classification.   All eGFR results are reported numerically with the following   interpretation.   GFR With Without   (ml/min/1.73 m2) Kidney Damage Kidney Damage

## 2021-06-30 NOTE — CONSULT NOTE ADULT - ASSESSMENT
77 YO F with erythema of gluteal area.    No fissure visualized  No concern for any other anal or rectal bleeding pathologies  No surgical concern at this time,  colorectal surgery will sign off. Thank you for the consult  Continue medical care by primary team    Case discussed with Dr Polk
Dementia  UTI    Chronic constipation  hx etoh use  Difficulty swallowing   advised egd and colonoscopy with possible biopsies with possible dilation with possible snare polypectomy with iv sedation  rationale, all potential risks including but not limited to perforation requiring surgery, aspiration pneumonia or bleeding requiring blood transfusions or missed neoplasm were discussed along with all benefits and alternatives (including barium studies or virtual colonoscopy) and the pt's daughter Angela has agreed that she have egd and colonoscopy  miralax one capful mixed in 8 ounces of water daily  senna two tabs at bedtime  tap water enema per rectum now     ADVISE CARDIAC CLEARANCE PRIOR TO EGD AND COLONOSCOPY PLANNED FOR POSSIBLE thursday    CHECK TSH  ppi
76 year old woman with history of CVA, dementia, carotid stenosis and LBBB present for evaluation prior to a colonoscopy.     6/30  From a cardiac perspective, she is optimized to proceed with her colonoscopy.   she has a history of carotid stenosis, stated in the chart but no recent carotid US. I will order a carotid US and an echo for baseline. this should not preclude her from moving forward with her GI studies.

## 2021-06-30 NOTE — PROGRESS NOTE ADULT - ASSESSMENT
Constipation    pt not cleared by cardiology    colonoscopy when cleared by cardiology or outpt virtual colonoscopy    continue miralax and senna

## 2021-06-30 NOTE — PROGRESS NOTE ADULT - ASSESSMENT
76F.  hx dementia.  presented to ED 06/25/2021.  patient lives with her  (primary care giver).  he was admitted to .  afterward, patient was being watched by her daughter.  it appeared to her that she was more forgetful and confused then usual.  she also was brought to the ED for evaluation.  in ED, FS >400mg/dL.  UA (+) WBCs.  UCx and BCx x 2 sets obtained in ED.  given ceftriaxone, IVFs and 6 units of insulin.    PMHx:  LBBB;  CVA;  DM;  breast CA; dementia.    constipation.  - bowel regimen.  - HC [place].    UTI.  - UCx:  100K Enterobacter cloacae.  - BCx:  no growth.  - completed ceftriaxone.    hx DM.  - improved.  - A1C 13.4%.  - consistent CHO diet.  - Lantus 10units sq qhs.  - lispro 3units sq qac.  - correction insulin coverage.    hx CVA.  - ASA 81mg po qd.  - atorvastatin 20m po qhs.    debility.  - PT.    DVT prophylaxis.  - UFH sq.    disposition.  Possibly to rehab in 1 or 2 days time

## 2021-06-30 NOTE — PROGRESS NOTE ADULT - SUBJECTIVE AND OBJECTIVE BOX
Chief Complaint:      HPI:  CC:  Patient is a 76y old  Female who presents with a chief complaint of confusion.    HPI:  76F.  hx dementia.  presented to ED 06/25/2021.  patient lives with her  (primary care giver).  he was admitted to .  afterward, patient was being watched by her daughter.  it appeared to her that she was more forgetful and confused then usual.  she also was brought to the ED for evaluation.  in ED, FS >400mg/dL.  UA (+) WBCs.  UCx and BCx x 2 sets obtained in ED.  given ceftriaxone, IVFs and 6 units of insulin.    PMHx:  LBBB;  CVA;  DM;  breast CA; dementia.    ROS:      all other review of systems are negative unless indicated above.    PAST MEDICAL & SURGICAL HISTORY:  DM (diabetes mellitus)  type 2, not on meds    Breast cancer    Cerebrovascular accident (CVA)    Dementia    LBBB (left bundle branch block)    Carotid stent occlusion        Allergies    No Known Allergies    Intolerances        Home Medications:  ·	patient does not take home medications.  pharmD confirmed w/ Dr. Gutiérrez.    Social History:  ·	.  ·	lives in a private home with her .  ·	children like locally and help them out.  ·	denied hx of tobacco, EtOH or street drugs.    FAMILY HISTORY:  No pertinent family history  of cancer        Vital Signs Last 24 Hrs  T(C): 36.4 (25 Jun 2021 07:05), Max: 36.6 (25 Jun 2021 00:48)  T(F): 97.6 (25 Jun 2021 07:05), Max: 97.9 (25 Jun 2021 00:48)  HR: 65 (25 Jun 2021 07:05) (65 - 83)  BP: 150/65 (25 Jun 2021 07:05) (133/67 - 151/74)  BP(mean): 83 (25 Jun 2021 07:05) (83 - 83)  RR: 18 (25 Jun 2021 07:05) (18 - 18)  SpO2: 100% (25 Jun 2021 07:05) (94% - 100%)    Constitutional: NAD.   HEENT: PERRL, EOMI, MMM.  Neck: Soft and supple, No carotid bruit, No JVD  Respiratory: Breath sounds are clear bilaterally, No wheezing, rales or rhonchi  Cardiovascular: EDOUARD 2/6.  regular.  Gastrointestinal: Bowel Sounds present, soft, nontender, nondistended, no guarding, no rebound, no mass.  Extremities: No peripheral edema  Vascular: 2+ peripheral pulses  Neurological: A/O x 2, no focal deficits  Musculoskeletal: 5/5 strength b/l upper and lower extremities  Skin:  no visible rashes.                             13.2   8.32  )-----------( 238      ( 25 Jun 2021 01:39 )             40.0           06-25    134<L>  |  101  |  23  ----------------------------<  378<H>  4.6   |  29  |  0.79    Ca    8.8      25 Jun 2021 01:39  Mg     1.9     06-25    TPro  6.9  /  Alb  2.9<L>  /  TBili  0.4  /  DBili  x   /  AST  25  /  ALT  19  /  AlkPhos  67  06-25      LIVER FUNCTIONS - ( 25 Jun 2021 01:39 )  Alb: 2.9 g/dL / Pro: 6.9 gm/dL / ALK PHOS: 67 U/L / ALT: 19 U/L / AST: 25 U/L / GGT: x             < from: CT Head No Cont (06.25.21 @ 02:40) >  IMPRESSION:    No acute intracranial hemorrhage, mass effect, or acute displaced calvarium fracture. Mild involutionaland chronic microvascular ischemic gliotic changes.    If there are new or persistent symptoms, consider further evaluation with MRI provided no contraindications.    < end of copied text >  < from: 12 Lead ECG (06.25.21 @ 04:31) >  Diagnosis Line Normal sinus rhythm  Left bundle branch block  Abnormal ECG  When compared with ECG of 05-FEB-2021 15:27,  No significant change was found    < end of copied text >         (25 Jun 2021 10:27)      Allergies    No Known Allergies    Intolerances        MEDICATIONS  (STANDING):  aspirin  chewable 81 milliGRAM(s) Oral daily  atorvastatin 20 milliGRAM(s) Oral at bedtime  bisacodyl Suppository 10 milliGRAM(s) Rectal daily  dextrose 40% Gel 15 Gram(s) Oral once  dextrose 5%. 1000 milliLiter(s) (50 mL/Hr) IV Continuous <Continuous>  dextrose 5%. 1000 milliLiter(s) (100 mL/Hr) IV Continuous <Continuous>  dextrose 50% Injectable 25 Gram(s) IV Push once  dextrose 50% Injectable 12.5 Gram(s) IV Push once  dextrose 50% Injectable 25 Gram(s) IV Push once  glucagon  Injectable 1 milliGRAM(s) IntraMuscular once  heparin   Injectable 5000 Unit(s) SubCutaneous every 12 hours  insulin glargine Injectable (LANTUS) 10 Unit(s) SubCutaneous at bedtime  insulin lispro (ADMELOG) corrective regimen sliding scale   SubCutaneous three times a day before meals  insulin lispro (ADMELOG) corrective regimen sliding scale   SubCutaneous at bedtime  insulin lispro Injectable (ADMELOG) 3 Unit(s) SubCutaneous three times a day before meals  nystatin Powder 1 Application(s) Topical two times a day  polyethylene glycol 3350 17 Gram(s) Oral daily    MEDICATIONS  (PRN):  acetaminophen   Tablet .. 650 milliGRAM(s) Oral every 6 hours PRN Mild Pain (1 - 3)  hydrocortisone hemorrhoidal Suppository 1 Suppository(s) Rectal daily PRN rectal pain  ondansetron Injectable 4 milliGRAM(s) IV Push every 6 hours PRN Nausea and/or Vomiting  senna 2 Tablet(s) Oral at bedtime PRN Constipation  zinc oxide 40% Ointment 1 Application(s) Topical daily PRN irritation      REVIEW OF SYSTEMS      General:	No fever or chills    Skin/Breast: No jaundice or rash   		  ENMT: denies sore throat or thrush    Respiratory and Thorax: Denies dyspnea or cough or shortness of breath  	  Cardiovascular: Denies chest pain or palpitations 	    Gastrointestinal: Denies jaundice or pruritis    Genitourinary: Denies dysuria or hematuria	    Musculoskeletal: Denies muscular pain or swelling	    Neurological: Denies confusion or tremor	    Hematology/Lymphatics: Denies easy bruising or bleeding 	    Endocrine:	Denies polyphagia or polyuria    See above hx otherwise neg for any major organ systems    PHYSICAL EXAM:    Vital Signs Last 24 Hrs  T(C): 36.8 (29 Jun 2021 21:33), Max: 36.9 (29 Jun 2021 16:01)  T(F): 98.3 (29 Jun 2021 21:33), Max: 98.4 (29 Jun 2021 16:01)  HR: 83 (29 Jun 2021 21:33) (83 - 86)  BP: 100/62 (30 Jun 2021 08:05) (100/62 - 118/56)  BP(mean): --  RR: 18 (29 Jun 2021 21:33) (18 - 18)  SpO2: 93% (29 Jun 2021 21:33) (93% - 94%)    Constitutional: no acute distress    ENMT: NC/AT scl anicteric opm pink no lesions     Neck: supple. No jvd or LN    Respiratory: Clear     Cardiovascular: RRR s1s2     Gastrointestinal: Pos bs , soft , not tender, no hepatosplenogaly,  no mass      Back: No CVA tenderness    Extremities: NO cce     Neurological: Alert and oriented x 3     Skin: No rash or jaundice    Date/Time:06-28 @ 11:58    ALT/SGPT: --  Albumin: --  AST/SGOT: --  Bilirubin Direct: --  Bilirubin Total: --  Ca: 8.8  eGFR : 106  eGFR Non-: 92  Lipase: --  Amylase: --  INR: --  PTT: --

## 2021-07-01 LAB — SARS-COV-2 RNA SPEC QL NAA+PROBE: SIGNIFICANT CHANGE UP

## 2021-07-01 PROCEDURE — 99232 SBSQ HOSP IP/OBS MODERATE 35: CPT

## 2021-07-01 RX ADMIN — Medication 81 MILLIGRAM(S): at 09:48

## 2021-07-01 RX ADMIN — Medication 6: at 16:57

## 2021-07-01 RX ADMIN — HEPARIN SODIUM 5000 UNIT(S): 5000 INJECTION INTRAVENOUS; SUBCUTANEOUS at 21:11

## 2021-07-01 RX ADMIN — POLYETHYLENE GLYCOL 3350 17 GRAM(S): 17 POWDER, FOR SOLUTION ORAL at 13:31

## 2021-07-01 RX ADMIN — Medication 3 UNIT(S): at 16:57

## 2021-07-01 RX ADMIN — HEPARIN SODIUM 5000 UNIT(S): 5000 INJECTION INTRAVENOUS; SUBCUTANEOUS at 09:48

## 2021-07-01 RX ADMIN — Medication 6: at 08:05

## 2021-07-01 RX ADMIN — NYSTATIN CREAM 1 APPLICATION(S): 100000 CREAM TOPICAL at 09:48

## 2021-07-01 RX ADMIN — INSULIN GLARGINE 10 UNIT(S): 100 INJECTION, SOLUTION SUBCUTANEOUS at 21:12

## 2021-07-01 RX ADMIN — Medication 3 UNIT(S): at 08:06

## 2021-07-01 RX ADMIN — Medication 10 MILLIGRAM(S): at 09:49

## 2021-07-01 RX ADMIN — Medication 6: at 11:46

## 2021-07-01 RX ADMIN — Medication 2: at 21:13

## 2021-07-01 RX ADMIN — NYSTATIN CREAM 1 APPLICATION(S): 100000 CREAM TOPICAL at 21:16

## 2021-07-01 RX ADMIN — Medication 3 UNIT(S): at 11:47

## 2021-07-01 RX ADMIN — ATORVASTATIN CALCIUM 20 MILLIGRAM(S): 80 TABLET, FILM COATED ORAL at 21:11

## 2021-07-01 NOTE — PROGRESS NOTE ADULT - PROVIDER SPECIALTY LIST ADULT
Gastroenterology
Hospitalist
Gastroenterology
Hospitalist

## 2021-07-01 NOTE — PROGRESS NOTE ADULT - ASSESSMENT
Constipation    miralax one capful mixed in 8 ounces of water daily    she has severe aortic stenosis    she is not cleared by cardiology for colonoscopy  in its place a virtual colonoscopy or cologuard test by her pcp can be considered for further colon evaluation  follow up with me in 2 weeks after dischaRGE.  Constipation    miralax one capful mixed in 8 ounces of water daily    she has severe aortic stenosis    she is not cleared by cardiology for colonoscopy  in its place a virtual colonoscopy or cologuard test by her pcp can be considered for further colon evaluation  follow up with me in 2 weeks after dischaRGE.       pcp to see can order cologuard test    spoke to daughter Angela who understands the above recommendations and information

## 2021-07-01 NOTE — PROGRESS NOTE ADULT - SUBJECTIVE AND OBJECTIVE BOX
CC:  AMS  SUBJECTIVE:     -no new complaints or issues at current time.  6/29/21: sitting up in chair, no new issues or complaints.  06/30/21: Patient seen and examined. Seeing by PT. No new issues. Discussed with  Dr TITA Lea. No inpatient EGD/colonoscopy  7/1: Lying in bed, alert, confused, denies any complaints.    ROS:  all other review of systems are negative unless indicated above.    Vital Signs Last 24 Hrs  T(C): 36.8 (01 Jul 2021 08:20), Max: 36.9 (30 Jun 2021 20:05)  T(F): 98.3 (01 Jul 2021 08:20), Max: 98.5 (30 Jun 2021 20:05)  HR: 67 (01 Jul 2021 08:20) (67 - 74)  BP: 101/69 (30 Jun 2021 20:05) (101/69 - 105/42)  BP(mean): --  RR: 17 (01 Jul 2021 08:20) (17 - 18)  SpO2: 94% (01 Jul 2021 08:20) (94% - 100%)    Constitutional: NAD.   HEENT: PERRL, EOMI, MMM.  Neck: Soft and supple, No carotid bruit, No JVD  Respiratory: Breath sounds are clear bilaterally, No wheezing, rales or rhonchi  Cardiovascular: S1 and S2, regular rate and rhythm, + murmur, rub or gallop.  Gastrointestinal: Bowel Sounds present, soft, nontender, nondistended, no guarding, no rebound, no mass.  Extremities: No peripheral edema  Vascular: 2+ peripheral pulses  Neurological: A/O x2 , no focal deficits  Musculoskeletal: 5/5 strength b/l upper and lower extremities  Skin:  no visible rashes.                MEDICATIONS  (STANDING):  aspirin  chewable 81 milliGRAM(s) Oral daily  atorvastatin 20 milliGRAM(s) Oral at bedtime  bisacodyl Suppository 10 milliGRAM(s) Rectal daily  dextrose 40% Gel 15 Gram(s) Oral once  dextrose 5%. 1000 milliLiter(s) (50 mL/Hr) IV Continuous <Continuous>  dextrose 5%. 1000 milliLiter(s) (100 mL/Hr) IV Continuous <Continuous>  dextrose 50% Injectable 25 Gram(s) IV Push once  dextrose 50% Injectable 12.5 Gram(s) IV Push once  dextrose 50% Injectable 25 Gram(s) IV Push once  glucagon  Injectable 1 milliGRAM(s) IntraMuscular once  heparin   Injectable 5000 Unit(s) SubCutaneous every 12 hours  insulin glargine Injectable (LANTUS) 10 Unit(s) SubCutaneous at bedtime  insulin lispro (ADMELOG) corrective regimen sliding scale   SubCutaneous three times a day before meals  insulin lispro (ADMELOG) corrective regimen sliding scale   SubCutaneous at bedtime  insulin lispro Injectable (ADMELOG) 3 Unit(s) SubCutaneous three times a day before meals  nystatin Powder 1 Application(s) Topical two times a day  polyethylene glycol 3350 17 Gram(s) Oral daily    MEDICATIONS  (PRN):  acetaminophen   Tablet .. 650 milliGRAM(s) Oral every 6 hours PRN Mild Pain (1 - 3)  hydrocortisone hemorrhoidal Suppository 1 Suppository(s) Rectal daily PRN rectal pain  ondansetron Injectable 4 milliGRAM(s) IV Push every 6 hours PRN Nausea and/or Vomiting  senna 2 Tablet(s) Oral at bedtime PRN Constipation  zinc oxide 40% Ointment 1 Application(s) Topical daily PRN irritation                  CAPILLARY BLOOD GLUCOSE      POCT Blood Glucose.: 298 mg/dL (01 Jul 2021 11:36)  POCT Blood Glucose.: 254 mg/dL (01 Jul 2021 07:58)  POCT Blood Glucose.: 222 mg/dL (30 Jun 2021 21:38)  POCT Blood Glucose.: 305 mg/dL (30 Jun 2021 16:42)                    Assessment and Plan:  76F.  hx dementia.  presented to ED 06/25/2021.  patient lives with her  (primary care giver).  he was admitted to .  afterward, patient was being watched by her daughter.  it appeared to her that she was more forgetful and confused then usual.  she also was brought to the ED for evaluation.     metabolic encephalopathy due to UTI:  - mentation at baseline   - UCx:  100K Enterobacter cloacae.  - BCx:  no growth.  - completed ceftriaxone.    DM2: Uncontrolled  - improved.  - A1C 13.4%.  - consistent CHO diet.  - Lantus 10units sq qhs.  - lispro 3units sq qac.  - correction insulin coverage.    LBBB / severe AS:  -echo noted  -med management  -cardiology follow up    hx CVA / carotid atherosclerosis:  - carotid doppler - 70% stenosis on right and 50-69% on left  - ASA 81mg po qd.  - atorvastatin 20m po qhs.    constipation:  - bowel regimen.    debility.  - PT.    DVT prophylaxis.  - UFH sq.    disposition: to rehab, likely tomorrow.

## 2021-07-01 NOTE — PROGRESS NOTE ADULT - SUBJECTIVE AND OBJECTIVE BOX
pt's daughter and pt say dysphagia is not a complaint of the pt   constipation    Allergies    No Known Allergies    Intolerances        MEDICATIONS  (STANDING):  aspirin  chewable 81 milliGRAM(s) Oral daily  atorvastatin 20 milliGRAM(s) Oral at bedtime  bisacodyl Suppository 10 milliGRAM(s) Rectal daily  dextrose 40% Gel 15 Gram(s) Oral once  dextrose 5%. 1000 milliLiter(s) (50 mL/Hr) IV Continuous <Continuous>  dextrose 5%. 1000 milliLiter(s) (100 mL/Hr) IV Continuous <Continuous>  dextrose 50% Injectable 25 Gram(s) IV Push once  dextrose 50% Injectable 12.5 Gram(s) IV Push once  dextrose 50% Injectable 25 Gram(s) IV Push once  glucagon  Injectable 1 milliGRAM(s) IntraMuscular once  heparin   Injectable 5000 Unit(s) SubCutaneous every 12 hours  insulin glargine Injectable (LANTUS) 10 Unit(s) SubCutaneous at bedtime  insulin lispro (ADMELOG) corrective regimen sliding scale   SubCutaneous three times a day before meals  insulin lispro (ADMELOG) corrective regimen sliding scale   SubCutaneous at bedtime  insulin lispro Injectable (ADMELOG) 3 Unit(s) SubCutaneous three times a day before meals  nystatin Powder 1 Application(s) Topical two times a day  polyethylene glycol 3350 17 Gram(s) Oral daily    MEDICATIONS  (PRN):  acetaminophen   Tablet .. 650 milliGRAM(s) Oral every 6 hours PRN Mild Pain (1 - 3)  hydrocortisone hemorrhoidal Suppository 1 Suppository(s) Rectal daily PRN rectal pain  ondansetron Injectable 4 milliGRAM(s) IV Push every 6 hours PRN Nausea and/or Vomiting  senna 2 Tablet(s) Oral at bedtime PRN Constipation  zinc oxide 40% Ointment 1 Application(s) Topical daily PRN irritation      REVIEW OF SYSTEMS      General:	No fever or chills    Skin/Breast: No jaundice or rash   		  ENMT: denies sore throat or thrush    Respiratory and Thorax: Denies dyspnea or cough or shortness of breath  	  Cardiovascular: Denies chest pain or palpitations 	    Gastrointestinal: Denies jaundice or pruritis    Genitourinary: Denies dysuria or hematuria	    Musculoskeletal: Denies muscular pain or swelling	    Neurological: Denies confusion or tremor	    Hematology/Lymphatics: Denies easy bruising or bleeding 	    Endocrine:	Denies polyphagia or polyuria    See above hx otherwise neg for any major organ systems    PHYSICAL EXAM:    Vital Signs Last 24 Hrs  T(C): 36.8 (01 Jul 2021 08:20), Max: 36.9 (30 Jun 2021 20:05)  T(F): 98.3 (01 Jul 2021 08:20), Max: 98.5 (30 Jun 2021 20:05)  HR: 67 (01 Jul 2021 08:20) (67 - 74)  BP: 101/69 (30 Jun 2021 20:05) (101/69 - 105/42)  BP(mean): --  RR: 17 (01 Jul 2021 08:20) (17 - 18)  SpO2: 94% (01 Jul 2021 08:20) (94% - 100%)    Constitutional: no acute distress    ENMT: NC/AT scl anicteric opm pink no lesions     Neck: supple. No jvd or LN    Respiratory: Clear     Cardiovascular: RRR s1s2     Gastrointestinal: Pos bs , soft , not tender, no hepatosplenomegaly,  no mass      Back: No CVA tenderness    Extremities: NO cce     Neurological: Alert and oriented x 3     Skin: No rash or jaundice

## 2021-07-02 ENCOUNTER — TRANSCRIPTION ENCOUNTER (OUTPATIENT)
Age: 76
End: 2021-07-02

## 2021-07-02 VITALS
RESPIRATION RATE: 18 BRPM | DIASTOLIC BLOOD PRESSURE: 71 MMHG | TEMPERATURE: 98 F | SYSTOLIC BLOOD PRESSURE: 122 MMHG | OXYGEN SATURATION: 95 % | HEART RATE: 78 BPM

## 2021-07-02 PROCEDURE — 99239 HOSP IP/OBS DSCHRG MGMT >30: CPT

## 2021-07-02 RX ORDER — ACETAMINOPHEN 500 MG
2 TABLET ORAL
Qty: 0 | Refills: 0 | DISCHARGE
Start: 2021-07-02

## 2021-07-02 RX ORDER — ASPIRIN/CALCIUM CARB/MAGNESIUM 324 MG
1 TABLET ORAL
Qty: 0 | Refills: 0 | DISCHARGE
Start: 2021-07-02

## 2021-07-02 RX ORDER — POLYETHYLENE GLYCOL 3350 17 G/17G
17 POWDER, FOR SOLUTION ORAL
Qty: 0 | Refills: 0 | DISCHARGE
Start: 2021-07-02

## 2021-07-02 RX ORDER — ATORVASTATIN CALCIUM 80 MG/1
1 TABLET, FILM COATED ORAL
Qty: 0 | Refills: 0 | DISCHARGE
Start: 2021-07-02

## 2021-07-02 RX ORDER — INSULIN GLARGINE 100 [IU]/ML
15 INJECTION, SOLUTION SUBCUTANEOUS
Qty: 0 | Refills: 0 | DISCHARGE
Start: 2021-07-02

## 2021-07-02 RX ADMIN — Medication 3 UNIT(S): at 12:03

## 2021-07-02 RX ADMIN — Medication 3 UNIT(S): at 07:34

## 2021-07-02 RX ADMIN — NYSTATIN CREAM 1 APPLICATION(S): 100000 CREAM TOPICAL at 12:02

## 2021-07-02 RX ADMIN — POLYETHYLENE GLYCOL 3350 17 GRAM(S): 17 POWDER, FOR SOLUTION ORAL at 09:20

## 2021-07-02 RX ADMIN — Medication 81 MILLIGRAM(S): at 09:20

## 2021-07-02 RX ADMIN — Medication 10 MILLIGRAM(S): at 09:20

## 2021-07-02 RX ADMIN — HEPARIN SODIUM 5000 UNIT(S): 5000 INJECTION INTRAVENOUS; SUBCUTANEOUS at 09:20

## 2021-07-02 RX ADMIN — Medication 6: at 07:34

## 2021-07-02 NOTE — DISCHARGE NOTE NURSING/CASE MANAGEMENT/SOCIAL WORK - PATIENT PORTAL LINK FT
You can access the FollowMyHealth Patient Portal offered by [facility] by registering at the following website: [email or web address]. By joining [employer]’s FollowMyHealth portal, you will also be able to view your health information using other applications (apps) compatible with our system.

## 2021-07-02 NOTE — DISCHARGE NOTE PROVIDER - PROVIDER TOKENS
PROVIDER:[TOKEN:[[id number]],FOLLOWUP:[Routine]],FREE:[LAST:[follow up with pcp routine],PHONE:[(   )    -],FAX:[(   )    -]]

## 2021-07-02 NOTE — DISCHARGE NOTE PROVIDER - HOSPITAL COURSE
CC: [facility]  Hospital course/HPI:  76F.  hx dementia.  presented to ED 06/25/2021.  patient lives with her  (primary care giver).  he was admitted to .  afterward, patient was being watched by her daughter.  it appeared to her that she was more forgetful and confused then usual.  she also was brought to the ED for evaluation.  in ED, FS >400mg/dL.  UA (+) WBCs.  UCx and BCx x 2 sets obtained in ED.  given ceftriaxone, IVFs and 6 units of insulin.    Pt was treated for metabolic encephalopathy due to UTI.  She completed a course of antibiotics for enterobacter cloacae.  Blood cultures negative.  Pt placed on insulin regimen for uncontrolled type 2 diabetes.  Pt also found to have carotid atherosclerosis of 70% on left internal and 50-69% on right.  She is on aspirin and statin therapy.  Echo noted to have severe AS however pt currently asymptomatic.  HD stable, satting well on room air.  Pt  denies fever, chills, N, V, abd pain, CP, SOB.  I spoke to son deyvi and updated him on plan of care and of cardiac findings.  At this time plan to go to [facility], and to follow up with her out patient providers for ongoing management of above findings.    REVIEW OF SYSTEMS: All other review of systems is negative unless indicated above.    Vital Signs Last 24 Hrs  T(C): 36.6 (02 Jul 2021 09:57), Max: 36.9 (01 Jul 2021 16:12)  T(F): 97.9 (02 Jul 2021 09:57), Max: 98.5 (01 Jul 2021 16:12)  HR: 75 (02 Jul 2021 09:57) (75 - 93)  BP: 107/73 (02 Jul 2021 09:57) (99/51 - 113/58)  BP(mean): --  RR: 17 (02 Jul 2021 09:57) (17 - 18)  SpO2: 96% (02 Jul 2021 09:57) (93% - 96%)    PHYSICAL EXAM:    Constitutional: NAD, awake and alert, frail, elderly  HEENT: PERR, EOMI, Normal Hearing, MMM  Neck: Soft and supple  Respiratory: Breath sounds are clear bilaterally, No wheezing, rales or rhonchi  Cardiovascular: S1 and S2, regular rate and rhythm, + Murmurs, gallops or rubs  Gastrointestinal: Bowel Sounds present, soft, nontender, nondistended, no guarding, no rebound  Extremities: No peripheral edema  Neurological: A/O x 1, no focal deficits in my limited exam    med/labs: Reviewed and interpreted           Assessment and Plan:  76F.  hx dementia.  presented to ED 06/25/2021.  patient lives with her  (primary care giver).  he was admitted to .  afterward, patient was being watched by her daughter.  it appeared to her that she was more forgetful and confused then usual.  she also was brought to the ED for evaluation.     metabolic encephalopathy due to UTI: RESOLVED  - mentation at baseline   - UCx:  100K Enterobacter cloacae.  - BCx:  no growth.  - completed ceftriaxone.    DM2: Uncontrolled  - A1C 13.4%.  - improved with insulin therapy   - consistent CHO diet.  - Lantus 10units sq qhs, increase to 15u on discharge.  - lispro 3units sq qac.  - correction insulin coverage.    LBBB / severe AS:  -echo noted  -med management  -discussed results with kevin Ornelas.  Plan is for conservative measures at this time given pt asymptomatic and to follow up with out patient provider.     hx CVA / carotid atherosclerosis:  - carotid doppler - 70% stenosis on right and 50-69% on left  - ASA 81mg po qd.  - atorvastatin 20m po qhs.  -discussed results with kevin Ornelas.      constipation:  - bowel regimen.    debility.  - PT.    DVT prophylaxis.  - UFH sq.    disposition: to rehab    Attending Statement: 40 minutes spent on total encounter and discharge planning.

## 2021-07-02 NOTE — DISCHARGE NOTE PROVIDER - CARE PROVIDER_API CALL
Sylvester Parada)  Cardiology; Interventional Cardiology  [address] [facility] [address] [place] [address]  Phone: [phone]  Fax: [phone]  Follow Up Time: Routine    follow up with pcp routine,   Phone: (   )    -  Fax: (   )    -  Follow Up Time:

## 2021-07-02 NOTE — DISCHARGE NOTE PROVIDER - NSDCMRMEDTOKEN_GEN_ALL_CORE_FT
acetaminophen 325 mg oral tablet: 2 tab(s) orally every 6 hours, As needed, Mild Pain (1 - 3)  aspirin 81 mg oral tablet, chewable: 1 tab(s) orally once a day  atorvastatin 20 mg oral tablet: 1 tab(s) orally once a day (at bedtime)  bisacodyl 10 mg rectal suppository: 1 suppository(ies) rectal once a day  insulin glargine: 15 unit(s) subcutaneous once a day (at bedtime)  polyethylene glycol 3350 oral powder for reconstitution: 17 gram(s) orally once a day

## 2021-07-02 NOTE — DISCHARGE NOTE PROVIDER - NSDCCPCAREPLAN_GEN_ALL_CORE_FT
PRINCIPAL DISCHARGE DIAGNOSIS  Diagnosis: Urinary tract infection without hematuria, site unspecified  Assessment and Plan of Treatment: Resolved      SECONDARY DISCHARGE DIAGNOSES  Diagnosis: Hyperglycemia  Assessment and Plan of Treatment: Due to diabetes  -continue insulin therapy  -diabetic diet  -follow up with pcp for ongoing management of blood sugars    Diagnosis: Dementia  Assessment and Plan of Treatment: Continue supportive care    Diagnosis: Carotid stenosis  Assessment and Plan of Treatment: Of bilateral internal carotid arteries due to atherosclerosis  -continue aspirin  -continue statin therapy    Diagnosis: Severe aortic stenosis  Assessment and Plan of Treatment: Follow up with your outpatient cardiologist for further management.

## 2021-07-14 DIAGNOSIS — I35.0 NONRHEUMATIC AORTIC (VALVE) STENOSIS: ICD-10-CM

## 2021-07-14 DIAGNOSIS — Z86.73 PERSONAL HISTORY OF TRANSIENT ISCHEMIC ATTACK (TIA), AND CEREBRAL INFARCTION WITHOUT RESIDUAL DEFICITS: ICD-10-CM

## 2021-07-14 DIAGNOSIS — K59.00 CONSTIPATION, UNSPECIFIED: ICD-10-CM

## 2021-07-14 DIAGNOSIS — Z85.3 PERSONAL HISTORY OF MALIGNANT NEOPLASM OF BREAST: ICD-10-CM

## 2021-07-14 DIAGNOSIS — G93.41 METABOLIC ENCEPHALOPATHY: ICD-10-CM

## 2021-07-14 DIAGNOSIS — Z79.4 LONG TERM (CURRENT) USE OF INSULIN: ICD-10-CM

## 2021-07-14 DIAGNOSIS — N39.0 URINARY TRACT INFECTION, SITE NOT SPECIFIED: ICD-10-CM

## 2021-07-14 DIAGNOSIS — R73.9 HYPERGLYCEMIA, UNSPECIFIED: ICD-10-CM

## 2021-07-14 DIAGNOSIS — F03.90 UNSPECIFIED DEMENTIA WITHOUT BEHAVIORAL DISTURBANCE: ICD-10-CM

## 2021-07-14 DIAGNOSIS — R53.81 OTHER MALAISE: ICD-10-CM

## 2021-07-14 DIAGNOSIS — E11.65 TYPE 2 DIABETES MELLITUS WITH HYPERGLYCEMIA: ICD-10-CM

## 2021-07-14 DIAGNOSIS — I44.7 LEFT BUNDLE-BRANCH BLOCK, UNSPECIFIED: ICD-10-CM

## 2021-07-14 DIAGNOSIS — Z79.82 LONG TERM (CURRENT) USE OF ASPIRIN: ICD-10-CM

## 2021-09-05 ENCOUNTER — INPATIENT (INPATIENT)
Facility: HOSPITAL | Age: 76
LOS: 10 days | Discharge: HOME HEALTH | DRG: 871 | End: 2021-09-16
Attending: FAMILY MEDICINE | Admitting: EMERGENCY MEDICINE
Payer: MEDICARE

## 2021-09-05 VITALS — WEIGHT: 154.98 LBS | HEIGHT: 72 IN

## 2021-09-05 DIAGNOSIS — T82.898A OTHER SPECIFIED COMPLICATION OF VASCULAR PROSTHETIC DEVICES, IMPLANTS AND GRAFTS, INITIAL ENCOUNTER: Chronic | ICD-10-CM

## 2021-09-05 DIAGNOSIS — A41.9 SEPSIS, UNSPECIFIED ORGANISM: ICD-10-CM

## 2021-09-05 LAB
APPEARANCE UR: ABNORMAL
APTT BLD: 36.8 SEC — HIGH (ref 27.5–35.5)
BASOPHILS # BLD AUTO: 0.01 K/UL — SIGNIFICANT CHANGE UP (ref 0–0.2)
BASOPHILS NFR BLD AUTO: 0.2 % — SIGNIFICANT CHANGE UP (ref 0–2)
BILIRUB UR-MCNC: NEGATIVE — SIGNIFICANT CHANGE UP
COLOR SPEC: YELLOW — SIGNIFICANT CHANGE UP
DIFF PNL FLD: ABNORMAL
EOSINOPHIL # BLD AUTO: 0 K/UL — SIGNIFICANT CHANGE UP (ref 0–0.5)
EOSINOPHIL NFR BLD AUTO: 0 % — SIGNIFICANT CHANGE UP (ref 0–6)
GLUCOSE UR QL: 250 MG/DL
HCT VFR BLD CALC: 42.8 % — SIGNIFICANT CHANGE UP (ref 34.5–45)
HGB BLD-MCNC: 14.1 G/DL — SIGNIFICANT CHANGE UP (ref 11.5–15.5)
IMM GRANULOCYTES NFR BLD AUTO: 0.2 % — SIGNIFICANT CHANGE UP (ref 0–1.5)
INR BLD: 2.08 RATIO — HIGH (ref 0.88–1.16)
KETONES UR-MCNC: ABNORMAL
LACTATE SERPL-SCNC: 1.3 MMOL/L — SIGNIFICANT CHANGE UP (ref 0.7–2)
LEUKOCYTE ESTERASE UR-ACNC: ABNORMAL
LYMPHOCYTES # BLD AUTO: 0.54 K/UL — LOW (ref 1–3.3)
LYMPHOCYTES # BLD AUTO: 13 % — SIGNIFICANT CHANGE UP (ref 13–44)
MCHC RBC-ENTMCNC: 29.7 PG — SIGNIFICANT CHANGE UP (ref 27–34)
MCHC RBC-ENTMCNC: 32.9 GM/DL — SIGNIFICANT CHANGE UP (ref 32–36)
MCV RBC AUTO: 90.3 FL — SIGNIFICANT CHANGE UP (ref 80–100)
MONOCYTES # BLD AUTO: 0.24 K/UL — SIGNIFICANT CHANGE UP (ref 0–0.9)
MONOCYTES NFR BLD AUTO: 5.8 % — SIGNIFICANT CHANGE UP (ref 2–14)
NEUTROPHILS # BLD AUTO: 3.35 K/UL — SIGNIFICANT CHANGE UP (ref 1.8–7.4)
NEUTROPHILS NFR BLD AUTO: 80.8 % — HIGH (ref 43–77)
NITRITE UR-MCNC: NEGATIVE — SIGNIFICANT CHANGE UP
PH UR: 5 — SIGNIFICANT CHANGE UP (ref 5–8)
PLATELET # BLD AUTO: 141 K/UL — LOW (ref 150–400)
PROT UR-MCNC: 100 MG/DL
PROTHROM AB SERPL-ACNC: 23.3 SEC — HIGH (ref 10.6–13.6)
RAPID RVP RESULT: DETECTED
RBC # BLD: 4.74 M/UL — SIGNIFICANT CHANGE UP (ref 3.8–5.2)
RBC # FLD: 12.4 % — SIGNIFICANT CHANGE UP (ref 10.3–14.5)
SARS-COV-2 RNA SPEC QL NAA+PROBE: DETECTED
SP GR SPEC: 1.02 — SIGNIFICANT CHANGE UP (ref 1.01–1.02)
UROBILINOGEN FLD QL: NEGATIVE MG/DL — SIGNIFICANT CHANGE UP
WBC # BLD: 4.15 K/UL — SIGNIFICANT CHANGE UP (ref 3.8–10.5)
WBC # FLD AUTO: 4.15 K/UL — SIGNIFICANT CHANGE UP (ref 3.8–10.5)

## 2021-09-05 PROCEDURE — 80076 HEPATIC FUNCTION PANEL: CPT

## 2021-09-05 PROCEDURE — 85025 COMPLETE CBC W/AUTO DIFF WBC: CPT

## 2021-09-05 PROCEDURE — 74177 CT ABD & PELVIS W/CONTRAST: CPT | Mod: 26

## 2021-09-05 PROCEDURE — 97116 GAIT TRAINING THERAPY: CPT | Mod: GP

## 2021-09-05 PROCEDURE — 71045 X-RAY EXAM CHEST 1 VIEW: CPT | Mod: 26

## 2021-09-05 PROCEDURE — 80053 COMPREHEN METABOLIC PANEL: CPT

## 2021-09-05 PROCEDURE — 86769 SARS-COV-2 COVID-19 ANTIBODY: CPT

## 2021-09-05 PROCEDURE — C9399: CPT

## 2021-09-05 PROCEDURE — 83036 HEMOGLOBIN GLYCOSYLATED A1C: CPT

## 2021-09-05 PROCEDURE — 99285 EMERGENCY DEPT VISIT HI MDM: CPT

## 2021-09-05 PROCEDURE — 97162 PT EVAL MOD COMPLEX 30 MIN: CPT | Mod: GP

## 2021-09-05 PROCEDURE — 99223 1ST HOSP IP/OBS HIGH 75: CPT

## 2021-09-05 PROCEDURE — 83735 ASSAY OF MAGNESIUM: CPT

## 2021-09-05 PROCEDURE — 82962 GLUCOSE BLOOD TEST: CPT

## 2021-09-05 PROCEDURE — 85379 FIBRIN DEGRADATION QUANT: CPT

## 2021-09-05 PROCEDURE — 93010 ELECTROCARDIOGRAM REPORT: CPT

## 2021-09-05 PROCEDURE — 80048 BASIC METABOLIC PNL TOTAL CA: CPT

## 2021-09-05 PROCEDURE — 97530 THERAPEUTIC ACTIVITIES: CPT | Mod: GP

## 2021-09-05 PROCEDURE — 36415 COLL VENOUS BLD VENIPUNCTURE: CPT

## 2021-09-05 PROCEDURE — 94760 N-INVAS EAR/PLS OXIMETRY 1: CPT

## 2021-09-05 PROCEDURE — 82248 BILIRUBIN DIRECT: CPT

## 2021-09-05 PROCEDURE — 84100 ASSAY OF PHOSPHORUS: CPT

## 2021-09-05 PROCEDURE — 85610 PROTHROMBIN TIME: CPT

## 2021-09-05 PROCEDURE — 82565 ASSAY OF CREATININE: CPT

## 2021-09-05 PROCEDURE — 74177 CT ABD & PELVIS W/CONTRAST: CPT

## 2021-09-05 RX ORDER — REMDESIVIR 5 MG/ML
INJECTION INTRAVENOUS
Refills: 0 | Status: COMPLETED | OUTPATIENT
Start: 2021-09-05 | End: 2021-09-09

## 2021-09-05 RX ORDER — SODIUM CHLORIDE 9 MG/ML
1000 INJECTION, SOLUTION INTRAVENOUS
Refills: 0 | Status: DISCONTINUED | OUTPATIENT
Start: 2021-09-05 | End: 2021-09-16

## 2021-09-05 RX ORDER — DEXTROSE 50 % IN WATER 50 %
25 SYRINGE (ML) INTRAVENOUS ONCE
Refills: 0 | Status: DISCONTINUED | OUTPATIENT
Start: 2021-09-05 | End: 2021-09-16

## 2021-09-05 RX ORDER — CEFEPIME 1 G/1
1000 INJECTION, POWDER, FOR SOLUTION INTRAMUSCULAR; INTRAVENOUS ONCE
Refills: 0 | Status: COMPLETED | OUTPATIENT
Start: 2021-09-05 | End: 2021-09-05

## 2021-09-05 RX ORDER — LANOLIN ALCOHOL/MO/W.PET/CERES
3 CREAM (GRAM) TOPICAL AT BEDTIME
Refills: 0 | Status: DISCONTINUED | OUTPATIENT
Start: 2021-09-05 | End: 2021-09-16

## 2021-09-05 RX ORDER — ASPIRIN/CALCIUM CARB/MAGNESIUM 324 MG
81 TABLET ORAL DAILY
Refills: 0 | Status: DISCONTINUED | OUTPATIENT
Start: 2021-09-05 | End: 2021-09-16

## 2021-09-05 RX ORDER — DEXTROSE 50 % IN WATER 50 %
12.5 SYRINGE (ML) INTRAVENOUS ONCE
Refills: 0 | Status: DISCONTINUED | OUTPATIENT
Start: 2021-09-05 | End: 2021-09-16

## 2021-09-05 RX ORDER — DEXTROSE 50 % IN WATER 50 %
15 SYRINGE (ML) INTRAVENOUS ONCE
Refills: 0 | Status: DISCONTINUED | OUTPATIENT
Start: 2021-09-05 | End: 2021-09-16

## 2021-09-05 RX ORDER — SODIUM CHLORIDE 9 MG/ML
2200 INJECTION, SOLUTION INTRAVENOUS ONCE
Refills: 0 | Status: COMPLETED | OUTPATIENT
Start: 2021-09-05 | End: 2021-09-05

## 2021-09-05 RX ORDER — REMDESIVIR 5 MG/ML
100 INJECTION INTRAVENOUS EVERY 24 HOURS
Refills: 0 | Status: COMPLETED | OUTPATIENT
Start: 2021-09-06 | End: 2021-09-09

## 2021-09-05 RX ORDER — CEFEPIME 1 G/1
1000 INJECTION, POWDER, FOR SOLUTION INTRAMUSCULAR; INTRAVENOUS ONCE
Refills: 0 | Status: DISCONTINUED | OUTPATIENT
Start: 2021-09-05 | End: 2021-09-05

## 2021-09-05 RX ORDER — INSULIN LISPRO 100/ML
VIAL (ML) SUBCUTANEOUS
Refills: 0 | Status: DISCONTINUED | OUTPATIENT
Start: 2021-09-05 | End: 2021-09-14

## 2021-09-05 RX ORDER — REMDESIVIR 5 MG/ML
200 INJECTION INTRAVENOUS ONCE
Refills: 0 | Status: DISCONTINUED | OUTPATIENT
Start: 2021-09-05 | End: 2021-09-05

## 2021-09-05 RX ORDER — DEXAMETHASONE 0.5 MG/5ML
6 ELIXIR ORAL DAILY
Refills: 0 | Status: DISCONTINUED | OUTPATIENT
Start: 2021-09-06 | End: 2021-09-10

## 2021-09-05 RX ORDER — ONDANSETRON 8 MG/1
4 TABLET, FILM COATED ORAL EVERY 8 HOURS
Refills: 0 | Status: DISCONTINUED | OUTPATIENT
Start: 2021-09-05 | End: 2021-09-16

## 2021-09-05 RX ORDER — VANCOMYCIN HCL 1 G
1000 VIAL (EA) INTRAVENOUS ONCE
Refills: 0 | Status: COMPLETED | OUTPATIENT
Start: 2021-09-05 | End: 2021-09-05

## 2021-09-05 RX ORDER — ATORVASTATIN CALCIUM 80 MG/1
20 TABLET, FILM COATED ORAL AT BEDTIME
Refills: 0 | Status: DISCONTINUED | OUTPATIENT
Start: 2021-09-05 | End: 2021-09-16

## 2021-09-05 RX ORDER — REMDESIVIR 5 MG/ML
200 INJECTION INTRAVENOUS EVERY 24 HOURS
Refills: 0 | Status: COMPLETED | OUTPATIENT
Start: 2021-09-05 | End: 2021-09-05

## 2021-09-05 RX ORDER — POLYETHYLENE GLYCOL 3350 17 G/17G
17 POWDER, FOR SOLUTION ORAL DAILY
Refills: 0 | Status: DISCONTINUED | OUTPATIENT
Start: 2021-09-05 | End: 2021-09-16

## 2021-09-05 RX ORDER — ACETAMINOPHEN 500 MG
650 TABLET ORAL EVERY 6 HOURS
Refills: 0 | Status: DISCONTINUED | OUTPATIENT
Start: 2021-09-05 | End: 2021-09-16

## 2021-09-05 RX ORDER — ACETAMINOPHEN 500 MG
1000 TABLET ORAL ONCE
Refills: 0 | Status: COMPLETED | OUTPATIENT
Start: 2021-09-05 | End: 2021-09-05

## 2021-09-05 RX ORDER — INSULIN GLARGINE 100 [IU]/ML
15 INJECTION, SOLUTION SUBCUTANEOUS AT BEDTIME
Refills: 0 | Status: DISCONTINUED | OUTPATIENT
Start: 2021-09-05 | End: 2021-09-06

## 2021-09-05 RX ORDER — CEFTRIAXONE 500 MG/1
1000 INJECTION, POWDER, FOR SOLUTION INTRAMUSCULAR; INTRAVENOUS EVERY 24 HOURS
Refills: 0 | Status: DISCONTINUED | OUTPATIENT
Start: 2021-09-05 | End: 2021-09-08

## 2021-09-05 RX ORDER — GLUCAGON INJECTION, SOLUTION 0.5 MG/.1ML
1 INJECTION, SOLUTION SUBCUTANEOUS ONCE
Refills: 0 | Status: DISCONTINUED | OUTPATIENT
Start: 2021-09-05 | End: 2021-09-16

## 2021-09-05 RX ORDER — DEXAMETHASONE 0.5 MG/5ML
6 ELIXIR ORAL ONCE
Refills: 0 | Status: COMPLETED | OUTPATIENT
Start: 2021-09-05 | End: 2021-09-05

## 2021-09-05 RX ADMIN — Medication 250 MILLIGRAM(S): at 14:23

## 2021-09-05 RX ADMIN — Medication 6 MILLIGRAM(S): at 18:19

## 2021-09-05 RX ADMIN — Medication 1000 MILLIGRAM(S): at 20:27

## 2021-09-05 RX ADMIN — INSULIN GLARGINE 15 UNIT(S): 100 INJECTION, SOLUTION SUBCUTANEOUS at 23:53

## 2021-09-05 RX ADMIN — ATORVASTATIN CALCIUM 20 MILLIGRAM(S): 80 TABLET, FILM COATED ORAL at 23:53

## 2021-09-05 RX ADMIN — CEFEPIME 1000 MILLIGRAM(S): 1 INJECTION, POWDER, FOR SOLUTION INTRAMUSCULAR; INTRAVENOUS at 14:14

## 2021-09-05 RX ADMIN — CEFTRIAXONE 1000 MILLIGRAM(S): 500 INJECTION, POWDER, FOR SOLUTION INTRAMUSCULAR; INTRAVENOUS at 18:19

## 2021-09-05 RX ADMIN — Medication 400 MILLIGRAM(S): at 14:03

## 2021-09-05 RX ADMIN — REMDESIVIR 580 MILLIGRAM(S): 5 INJECTION INTRAVENOUS at 18:42

## 2021-09-05 RX ADMIN — SODIUM CHLORIDE 2200 MILLILITER(S): 9 INJECTION, SOLUTION INTRAVENOUS at 14:05

## 2021-09-05 NOTE — ED PROVIDER NOTE - OBJECTIVE STATEMENT
77 yo pt PMHx of dementia, CVA, breast CA, DM, LBBB presents with daughter for AMS, and fever.  Pt lives with  (primary care giver) who was admitted today in the hospital.  pt here with daughter.  Daughter noticed tonight that pt was unsteady and almost feel twice.  Pt also had episode of being very agitated and aggressive.  Daughter checked blood sugar and was over 400. Pt is not compliant with medication and not prescribed any meds.  Patient's  is currently admitted for +COVID. ~Yannick Hinson PA-C PA: Patient is a 75 yo female with PMHx of dementia, CVA, breast CA, DM, LBBB who presents to ED with c/o AMS, cough and fever. Pt lives with her  who is actually being admitted at  today for COVID+. Patient is unable to provide a good history due to dementia hx. ~Yannick Hinson PA-C

## 2021-09-05 NOTE — ED PROVIDER NOTE - CLINICAL SUMMARY MEDICAL DECISION MAKING FREE TEXT BOX
PA: Patient presented with fever, cough. +UTI, +COVID. No evidence of buttock cellulitis. Admit to hospitalist Dr. Flynn. ~Yannick Hinson PA-C

## 2021-09-05 NOTE — ED PROVIDER NOTE - CARE PLAN
1 Principal Discharge DX:	Sepsis  Secondary Diagnosis:	Acute UTI  Secondary Diagnosis:	Pneumonia due to COVID-19 virus  Secondary Diagnosis:	COPD with hypoxia

## 2021-09-05 NOTE — ED ADULT TRIAGE NOTE - CHIEF COMPLAINT QUOTE
Pt. to the ED BIBA C/O Worsening of Mental Status since last night- Hx. of DM -- Pt. currently Febrile

## 2021-09-05 NOTE — ED PROVIDER NOTE - RESPIRATORY [+], MLM
Patient ID: Alan Hardin  MRN: [id number]  : 1936    Chief Complaint   Patient presents with   â¢ Follow-up     Patient here for 2 month check up   â¢ Back Pain       HPI  Patient is 80years old [place] speaking female who is here today for follow-up appointment  She continues to complain of a lot of pain in the shoulders, pain due to fibromyalgia  Everything remains the same but she decided to take gabapentin. Currently on 300 mg at night    No past medical history on file. No family history on file. No past surgical history on file. Social History     Socioeconomic History   â¢ Marital status:      Spouse name: Not on file   â¢ Number of children: Not on file   â¢ Years of education: Not on file   â¢ Highest education level: Not on file   Social Needs   â¢ Financial resource strain: Not on file   â¢ Food insecurity - worry: Not on file   â¢ Food insecurity - inability: Not on file   â¢ Transportation needs - medical: Not on file   â¢ Transportation needs - non-medical: Not on file   Occupational History   â¢ Not on file   Tobacco Use   â¢ Smoking status: Never Smoker   â¢ Smokeless tobacco: Never Used   Substance and Sexual Activity   â¢ Alcohol use: No     Frequency: Never   â¢ Drug use: No   â¢ Sexual activity: Not on file   Other Topics Concern   â¢ Not on file   Social History Narrative   â¢ Not on file       Current Outpatient Medications   Medication Sig Dispense Refill   â¢ levothyroxine (SYNTHROID, LEVOTHROID) 88 MCG tablet Take 88 mcg by mouth daily. â¢ gabapentin (NEURONTIN) 100 MG capsule Take 100 mg by mouth 3 times daily. â¢ losartan-hydrochlorothiazide (HYZAAR) 50-12.5 MG per tablet Take 1 tablet by mouth daily.  TAKE 1 TABLET BY MOUTH DAILY 30 tablet 3   â¢ levothyroxine (SYNTHROID) 25 MCG tablet TAKE 1 TABLET ON SATURDAY AND  WITH 88 MCG PILLS      â¢ metoPROLOL tartrate (LOPRESSOR) 25 MG tablet TAKE 1/2 TABLET BY MOUTH EVERY DAY     â¢ aspirin (ASPIR-81) 81 MG EC tablet TAKE ONE TABLET "ONCE DAILY WITH MEALS     â¢ diclofenac (VOLTAREN) 50 MG EC tablet TAKE 1 TABLET EVERY 12 HOURS     â¢ meclizine HCl (ANTIVERT) 25 MG tablet TAKE 1 TABLET EVERY 8 TO 12 HOURS AS NEEDED     â¢ Psyllium (METAMUCIL) 48.57 % Powder USE AS DIRECTED     â¢ diclofenac (VOLTAREN) 1 % gel APPLY TO LOWER EXTREMITIES, 4 GM OF GEL TO AFFECTED AREA 4 TIMES DAILY. DO NOT APPLY MORE THAN 16 GM DAILY TO ANY ONE AFFECTED JOINT       No current facility-administered medications for this visit. ALLERGIES:  No Known Allergies      ROS  Review of Systems   Constitutional: Negative. HENT: Negative. Respiratory: Negative. Cardiovascular: Negative. Musculoskeletal: Positive for arthralgias, back pain and myalgias. Physical:  Visit Vitals  /60 ([place] [facility] [place]itting, Cuff Size: Regular)   Pulse 62   Temp 98.4 Â°F (36.9 Â°C) (Temporal)   Resp 16   Ht 4' 9.09"" (1.45 m)   Wt 70 kg (154 lb 5.2 oz)   SpO2 98%   BMI 33.29 kg/mÂ²       Physical Exam   Constitutional: She appears well-developed and well-nourished. Eyes: Pupils are equal, round, and reactive to light. Cardiovascular: Normal rate, regular rhythm and normal heart sounds. Pulmonary/Chest: Effort normal and breath sounds normal.   Abdominal: Soft. Bowel sounds are normal.   Musculoskeletal:   Diminished range of motion of the shoulders, positive fibromyalgia tender points         ASSESSMENT AND PLAN:  1. Fibromyalgia  Management discussed  She will continue gabapentin, will titrate to achieve therapeutic benefit    2. HTN (hypertension), benign  Controlled no change in regimen  - losartan-hydrochlorothiazide (HYZAAR) 50-12.5 MG per tablet; Take 1 tablet by mouth daily. TAKE 1 TABLET BY MOUTH DAILY  Dispense: 30 tablet; Refill: 3    3. Hypothyroidism, unspecified type  Recent blood work in October, normal TSH    4.  Mixed hyperlipidemia  Recent blood work in October, elevated LDL but unable to tolerate statins because of " fibromyalgia    5. Complete tear of right rotator cuff  We will continue with current management with topical    6.  Hemorrhoids, unspecified hemorrhoid type  Management of constipation discussed  - SERVICE TO SURGERY COLORECTAL      Follow-up 2 months        Paula Paulson MD COUGH

## 2021-09-05 NOTE — H&P ADULT - NSHPPHYSICALEXAM_GEN_ALL_CORE
Vital Signs Last 24 Hrs  T(C): 37.4 (05 Sep 2021 20:43), Max: 39.8 (05 Sep 2021 13:37)  T(F): 99.3 (05 Sep 2021 20:43), Max: 103.6 (05 Sep 2021 13:37)  HR: 96 (05 Sep 2021 20:43) (93 - 108)  BP: 129/93 (05 Sep 2021 20:43) (111/77 - 136/72)  BP(mean): 104 (05 Sep 2021 20:43) (78 - 104)  RR: 20 (05 Sep 2021 20:43) (18 - 29)  SpO2: 95% (05 Sep 2021 20:43) (88% - 95%)

## 2021-09-05 NOTE — ED ADULT NURSE REASSESSMENT NOTE - NS ED NURSE REASSESS COMMENT FT1
pt is a&0, on 4L NC spo2 at 98% is covid +, v/s at this time are stable, pt is asleep on purwick in place and working, pt put out 900 ml of urine, remdesivir infusion is complete. pt pending admission to the floor

## 2021-09-05 NOTE — ED ADULT NURSE NOTE - OBJECTIVE STATEMENT
Patient BIBA from home complaining of AMS. Patient febrile on arrival, O2 87% on RA, Patient placed on NC. hx COPD, dementia. Patient unable to answer assessment questions due to medical status. IAD with excoration, reddish purple with inflammation to sacrum, B/L buttocks, patient incontinent at baseline

## 2021-09-05 NOTE — ED PROVIDER NOTE - SKIN, MLM
+Skin excoriations with erythema noted over buttocks, gluteal folds. Mild swelling noted over external vaginal area. Skin temp warm. exam chaperoned by alonzo lane RN.

## 2021-09-05 NOTE — ED PROVIDER NOTE - NSICDXPASTMEDICALHX_GEN_ALL_CORE_FT
PAST MEDICAL HISTORY:  Breast cancer     Cerebrovascular accident (CVA)     Dementia     DM (diabetes mellitus) type 2, not on meds    LBBB (left bundle branch block)

## 2021-09-05 NOTE — H&P ADULT - HISTORY OF PRESENT ILLNESS
76 year old female patient with pertinent history of Dementia presented to the ED with mental status changes that were witnessed by her daughter. Per collateral information patient with fever and mental status changes, also noted to be unsteady on her feet. Patient's primary caregiver is her  who is currently admitted. At the time of evaluation patient is alert and conversant- able to specify that her location and who she is. endorses occasional confusion. Of note, patient is not vaccinated        In the ED patient with UA consistent with a UTI. Patient positive for Covid and initially hypoxic on ED presentation.

## 2021-09-06 LAB
A1C WITH ESTIMATED AVERAGE GLUCOSE RESULT: 11.5 % — HIGH (ref 4–5.6)
ALBUMIN SERPL ELPH-MCNC: 2.5 G/DL — LOW (ref 3.3–5)
ALP SERPL-CCNC: 70 U/L — SIGNIFICANT CHANGE UP (ref 40–120)
ALT FLD-CCNC: 20 U/L — SIGNIFICANT CHANGE UP (ref 12–78)
ANION GAP SERPL CALC-SCNC: 5 MMOL/L — SIGNIFICANT CHANGE UP (ref 5–17)
AST SERPL-CCNC: 20 U/L — SIGNIFICANT CHANGE UP (ref 15–37)
BASOPHILS # BLD AUTO: 0 K/UL — SIGNIFICANT CHANGE UP (ref 0–0.2)
BASOPHILS NFR BLD AUTO: 0 % — SIGNIFICANT CHANGE UP (ref 0–2)
BILIRUB SERPL-MCNC: 0.4 MG/DL — SIGNIFICANT CHANGE UP (ref 0.2–1.2)
BUN SERPL-MCNC: 14 MG/DL — SIGNIFICANT CHANGE UP (ref 7–23)
CALCIUM SERPL-MCNC: 8.9 MG/DL — SIGNIFICANT CHANGE UP (ref 8.5–10.1)
CHLORIDE SERPL-SCNC: 105 MMOL/L — SIGNIFICANT CHANGE UP (ref 96–108)
CO2 SERPL-SCNC: 27 MMOL/L — SIGNIFICANT CHANGE UP (ref 22–31)
COVID-19 SPIKE DOMAIN AB INTERP: NEGATIVE — SIGNIFICANT CHANGE UP
COVID-19 SPIKE DOMAIN ANTIBODY RESULT: 0.4 U/ML — SIGNIFICANT CHANGE UP
CREAT SERPL-MCNC: 0.69 MG/DL — SIGNIFICANT CHANGE UP (ref 0.5–1.3)
CULTURE RESULTS: SIGNIFICANT CHANGE UP
EOSINOPHIL # BLD AUTO: 0 K/UL — SIGNIFICANT CHANGE UP (ref 0–0.5)
EOSINOPHIL NFR BLD AUTO: 0 % — SIGNIFICANT CHANGE UP (ref 0–6)
ESTIMATED AVERAGE GLUCOSE: 283 MG/DL — HIGH (ref 68–114)
GLUCOSE BLDC GLUCOMTR-MCNC: 244 MG/DL — HIGH (ref 70–99)
GLUCOSE BLDC GLUCOMTR-MCNC: 285 MG/DL — HIGH (ref 70–99)
GLUCOSE SERPL-MCNC: 253 MG/DL — HIGH (ref 70–99)
HCT VFR BLD CALC: 43.3 % — SIGNIFICANT CHANGE UP (ref 34.5–45)
HGB BLD-MCNC: 14.5 G/DL — SIGNIFICANT CHANGE UP (ref 11.5–15.5)
IMM GRANULOCYTES NFR BLD AUTO: 0.6 % — SIGNIFICANT CHANGE UP (ref 0–1.5)
INR BLD: 2.2 RATIO — HIGH (ref 0.88–1.16)
LYMPHOCYTES # BLD AUTO: 0.37 K/UL — LOW (ref 1–3.3)
LYMPHOCYTES # BLD AUTO: 10.8 % — LOW (ref 13–44)
MCHC RBC-ENTMCNC: 30.5 PG — SIGNIFICANT CHANGE UP (ref 27–34)
MCHC RBC-ENTMCNC: 33.5 GM/DL — SIGNIFICANT CHANGE UP (ref 32–36)
MCV RBC AUTO: 91 FL — SIGNIFICANT CHANGE UP (ref 80–100)
MONOCYTES # BLD AUTO: 0.23 K/UL — SIGNIFICANT CHANGE UP (ref 0–0.9)
MONOCYTES NFR BLD AUTO: 6.7 % — SIGNIFICANT CHANGE UP (ref 2–14)
NEUTROPHILS # BLD AUTO: 2.81 K/UL — SIGNIFICANT CHANGE UP (ref 1.8–7.4)
NEUTROPHILS NFR BLD AUTO: 81.9 % — HIGH (ref 43–77)
PLATELET # BLD AUTO: 143 K/UL — LOW (ref 150–400)
POTASSIUM SERPL-MCNC: 4.2 MMOL/L — SIGNIFICANT CHANGE UP (ref 3.5–5.3)
POTASSIUM SERPL-SCNC: 4.2 MMOL/L — SIGNIFICANT CHANGE UP (ref 3.5–5.3)
PROT SERPL-MCNC: 6.5 GM/DL — SIGNIFICANT CHANGE UP (ref 6–8.3)
PROTHROM AB SERPL-ACNC: 24.8 SEC — HIGH (ref 10.6–13.6)
RBC # BLD: 4.76 M/UL — SIGNIFICANT CHANGE UP (ref 3.8–5.2)
RBC # FLD: 12.6 % — SIGNIFICANT CHANGE UP (ref 10.3–14.5)
SARS-COV-2 IGG+IGM SERPL QL IA: 0.4 U/ML — SIGNIFICANT CHANGE UP
SARS-COV-2 IGG+IGM SERPL QL IA: NEGATIVE — SIGNIFICANT CHANGE UP
SODIUM SERPL-SCNC: 137 MMOL/L — SIGNIFICANT CHANGE UP (ref 135–145)
SPECIMEN SOURCE: SIGNIFICANT CHANGE UP
WBC # BLD: 3.43 K/UL — LOW (ref 3.8–10.5)
WBC # FLD AUTO: 3.43 K/UL — LOW (ref 3.8–10.5)

## 2021-09-06 PROCEDURE — 99232 SBSQ HOSP IP/OBS MODERATE 35: CPT

## 2021-09-06 RX ORDER — INSULIN GLARGINE 100 [IU]/ML
10 INJECTION, SOLUTION SUBCUTANEOUS AT BEDTIME
Refills: 0 | Status: DISCONTINUED | OUTPATIENT
Start: 2021-09-06 | End: 2021-09-07

## 2021-09-06 RX ORDER — ENOXAPARIN SODIUM 100 MG/ML
40 INJECTION SUBCUTANEOUS DAILY
Refills: 0 | Status: DISCONTINUED | OUTPATIENT
Start: 2021-09-06 | End: 2021-09-16

## 2021-09-06 RX ADMIN — Medication 2: at 12:02

## 2021-09-06 RX ADMIN — Medication 81 MILLIGRAM(S): at 09:29

## 2021-09-06 RX ADMIN — Medication 2: at 17:09

## 2021-09-06 RX ADMIN — CEFTRIAXONE 1000 MILLIGRAM(S): 500 INJECTION, POWDER, FOR SOLUTION INTRAMUSCULAR; INTRAVENOUS at 17:11

## 2021-09-06 RX ADMIN — ATORVASTATIN CALCIUM 20 MILLIGRAM(S): 80 TABLET, FILM COATED ORAL at 22:25

## 2021-09-06 RX ADMIN — Medication 3: at 08:13

## 2021-09-06 RX ADMIN — Medication 6 MILLIGRAM(S): at 09:25

## 2021-09-06 RX ADMIN — INSULIN GLARGINE 10 UNIT(S): 100 INJECTION, SOLUTION SUBCUTANEOUS at 22:29

## 2021-09-06 RX ADMIN — REMDESIVIR 540 MILLIGRAM(S): 5 INJECTION INTRAVENOUS at 17:10

## 2021-09-06 RX ADMIN — POLYETHYLENE GLYCOL 3350 17 GRAM(S): 17 POWDER, FOR SOLUTION ORAL at 09:25

## 2021-09-06 RX ADMIN — Medication 10 MILLIGRAM(S): at 11:10

## 2021-09-07 LAB
ALBUMIN SERPL ELPH-MCNC: 2.2 G/DL — LOW (ref 3.3–5)
ALP SERPL-CCNC: 64 U/L — SIGNIFICANT CHANGE UP (ref 40–120)
ALT FLD-CCNC: 17 U/L — SIGNIFICANT CHANGE UP (ref 12–78)
ANION GAP SERPL CALC-SCNC: 4 MMOL/L — LOW (ref 5–17)
AST SERPL-CCNC: 19 U/L — SIGNIFICANT CHANGE UP (ref 15–37)
BASOPHILS # BLD AUTO: 0.01 K/UL — SIGNIFICANT CHANGE UP (ref 0–0.2)
BASOPHILS NFR BLD AUTO: 0.2 % — SIGNIFICANT CHANGE UP (ref 0–2)
BILIRUB SERPL-MCNC: 0.5 MG/DL — SIGNIFICANT CHANGE UP (ref 0.2–1.2)
BUN SERPL-MCNC: 21 MG/DL — SIGNIFICANT CHANGE UP (ref 7–23)
CALCIUM SERPL-MCNC: 8.2 MG/DL — LOW (ref 8.5–10.1)
CHLORIDE SERPL-SCNC: 103 MMOL/L — SIGNIFICANT CHANGE UP (ref 96–108)
CO2 SERPL-SCNC: 28 MMOL/L — SIGNIFICANT CHANGE UP (ref 22–31)
CREAT SERPL-MCNC: 0.8 MG/DL — SIGNIFICANT CHANGE UP (ref 0.5–1.3)
EOSINOPHIL # BLD AUTO: 0 K/UL — SIGNIFICANT CHANGE UP (ref 0–0.5)
EOSINOPHIL NFR BLD AUTO: 0 % — SIGNIFICANT CHANGE UP (ref 0–6)
GLUCOSE BLDC GLUCOMTR-MCNC: 280 MG/DL — HIGH (ref 70–99)
GLUCOSE BLDC GLUCOMTR-MCNC: 302 MG/DL — HIGH (ref 70–99)
GLUCOSE BLDC GLUCOMTR-MCNC: 341 MG/DL — HIGH (ref 70–99)
GLUCOSE BLDC GLUCOMTR-MCNC: 406 MG/DL — HIGH (ref 70–99)
GLUCOSE BLDC GLUCOMTR-MCNC: 430 MG/DL — HIGH (ref 70–99)
GLUCOSE BLDC GLUCOMTR-MCNC: 444 MG/DL — HIGH (ref 70–99)
GLUCOSE SERPL-MCNC: 376 MG/DL — HIGH (ref 70–99)
HCT VFR BLD CALC: 41.7 % — SIGNIFICANT CHANGE UP (ref 34.5–45)
HGB BLD-MCNC: 13.5 G/DL — SIGNIFICANT CHANGE UP (ref 11.5–15.5)
IMM GRANULOCYTES NFR BLD AUTO: 0.3 % — SIGNIFICANT CHANGE UP (ref 0–1.5)
INR BLD: 1.5 RATIO — HIGH (ref 0.88–1.16)
LYMPHOCYTES # BLD AUTO: 0.3 K/UL — LOW (ref 1–3.3)
LYMPHOCYTES # BLD AUTO: 5.2 % — LOW (ref 13–44)
MAGNESIUM SERPL-MCNC: 2 MG/DL — SIGNIFICANT CHANGE UP (ref 1.6–2.6)
MCHC RBC-ENTMCNC: 29.3 PG — SIGNIFICANT CHANGE UP (ref 27–34)
MCHC RBC-ENTMCNC: 32.4 GM/DL — SIGNIFICANT CHANGE UP (ref 32–36)
MCV RBC AUTO: 90.7 FL — SIGNIFICANT CHANGE UP (ref 80–100)
MONOCYTES # BLD AUTO: 0.31 K/UL — SIGNIFICANT CHANGE UP (ref 0–0.9)
MONOCYTES NFR BLD AUTO: 5.4 % — SIGNIFICANT CHANGE UP (ref 2–14)
NEUTROPHILS # BLD AUTO: 5.13 K/UL — SIGNIFICANT CHANGE UP (ref 1.8–7.4)
NEUTROPHILS NFR BLD AUTO: 88.9 % — HIGH (ref 43–77)
PHOSPHATE SERPL-MCNC: 2.3 MG/DL — LOW (ref 2.5–4.5)
PLATELET # BLD AUTO: 167 K/UL — SIGNIFICANT CHANGE UP (ref 150–400)
POTASSIUM SERPL-MCNC: 4.3 MMOL/L — SIGNIFICANT CHANGE UP (ref 3.5–5.3)
POTASSIUM SERPL-SCNC: 4.3 MMOL/L — SIGNIFICANT CHANGE UP (ref 3.5–5.3)
PROT SERPL-MCNC: 6.2 GM/DL — SIGNIFICANT CHANGE UP (ref 6–8.3)
PROTHROM AB SERPL-ACNC: 17.1 SEC — HIGH (ref 10.6–13.6)
RBC # BLD: 4.6 M/UL — SIGNIFICANT CHANGE UP (ref 3.8–5.2)
RBC # FLD: 12.9 % — SIGNIFICANT CHANGE UP (ref 10.3–14.5)
SODIUM SERPL-SCNC: 135 MMOL/L — SIGNIFICANT CHANGE UP (ref 135–145)
WBC # BLD: 5.77 K/UL — SIGNIFICANT CHANGE UP (ref 3.8–10.5)
WBC # FLD AUTO: 5.77 K/UL — SIGNIFICANT CHANGE UP (ref 3.8–10.5)

## 2021-09-07 PROCEDURE — 99232 SBSQ HOSP IP/OBS MODERATE 35: CPT

## 2021-09-07 RX ORDER — INSULIN GLARGINE 100 [IU]/ML
15 INJECTION, SOLUTION SUBCUTANEOUS
Refills: 0 | Status: DISCONTINUED | OUTPATIENT
Start: 2021-09-07 | End: 2021-09-07

## 2021-09-07 RX ORDER — INSULIN GLARGINE 100 [IU]/ML
25 INJECTION, SOLUTION SUBCUTANEOUS
Refills: 0 | Status: DISCONTINUED | OUTPATIENT
Start: 2021-09-07 | End: 2021-09-08

## 2021-09-07 RX ORDER — INSULIN LISPRO 100/ML
6 VIAL (ML) SUBCUTANEOUS ONCE
Refills: 0 | Status: COMPLETED | OUTPATIENT
Start: 2021-09-07 | End: 2021-09-07

## 2021-09-07 RX ADMIN — Medication 10 MILLIGRAM(S): at 09:08

## 2021-09-07 RX ADMIN — INSULIN GLARGINE 25 UNIT(S): 100 INJECTION, SOLUTION SUBCUTANEOUS at 22:51

## 2021-09-07 RX ADMIN — Medication 6 UNIT(S): at 18:56

## 2021-09-07 RX ADMIN — REMDESIVIR 540 MILLIGRAM(S): 5 INJECTION INTRAVENOUS at 17:50

## 2021-09-07 RX ADMIN — Medication 81 MILLIGRAM(S): at 13:04

## 2021-09-07 RX ADMIN — Medication 6 MILLIGRAM(S): at 09:08

## 2021-09-07 RX ADMIN — INSULIN GLARGINE 15 UNIT(S): 100 INJECTION, SOLUTION SUBCUTANEOUS at 13:04

## 2021-09-07 RX ADMIN — CEFTRIAXONE 1000 MILLIGRAM(S): 500 INJECTION, POWDER, FOR SOLUTION INTRAMUSCULAR; INTRAVENOUS at 17:50

## 2021-09-07 RX ADMIN — ENOXAPARIN SODIUM 40 MILLIGRAM(S): 100 INJECTION SUBCUTANEOUS at 09:10

## 2021-09-07 RX ADMIN — Medication 4: at 09:07

## 2021-09-07 RX ADMIN — ATORVASTATIN CALCIUM 20 MILLIGRAM(S): 80 TABLET, FILM COATED ORAL at 22:47

## 2021-09-07 RX ADMIN — Medication 6: at 17:48

## 2021-09-07 RX ADMIN — Medication 4: at 13:02

## 2021-09-07 RX ADMIN — POLYETHYLENE GLYCOL 3350 17 GRAM(S): 17 POWDER, FOR SOLUTION ORAL at 09:10

## 2021-09-07 NOTE — PHYSICAL THERAPY INITIAL EVALUATION ADULT - FUNCTIONAL LIMITATIONS, PT EVAL
Staff message forwarded to [facility] for scheduling.   self-care/home management/community/leisure

## 2021-09-07 NOTE — PHYSICAL THERAPY INITIAL EVALUATION ADULT - PERTINENT HX OF CURRENT PROBLEM, REHAB EVAL
pt relaxing ,dozing intermittently in bed,+Primafit external urinary catheter in place but bed pad soaked with urine (?malpositioned) pt appears comfortable at rest ,on nasal O2 2L/min ,fed self 2 jellos and drank apple juice using straw ,A & Ox1

## 2021-09-07 NOTE — PHYSICAL THERAPY INITIAL EVALUATION ADULT - DISCHARGE DISPOSITION, PT EVAL
recommend [facility] but may need LTC given cognitive impairment /husbands /caretakers debility/acute illness/extended care facility/rehabilitation facility

## 2021-09-07 NOTE — PHYSICAL THERAPY INITIAL EVALUATION ADULT - ASSISTIVE DEVICE FOR TRANSFER: SIT/STAND, REHAB EVAL
per  uses a standard walker in the home ,concern about posterior imbalance when lifting walker off floor to advance it/rolling walker

## 2021-09-07 NOTE — PHYSICAL THERAPY INITIAL EVALUATION ADULT - DIAGNOSIS, PT EVAL
AMS/encephalopathy ,acute hypoxic respiratory failure,+ Covid 19 ,dementia AMS/encephalopathy ,acute hypoxic respiratory failure,+ Covid 19 ,dementia,remote TIA/CVA

## 2021-09-07 NOTE — CDI QUERY NOTE - NSCDIOTHERTXTBX_GEN_ALL_CORE_HH
The Physician's or Provider's documentation of sepsis is clinically supported by the patient's presentation, evaluation and medical management, as indicated below.  There is a need to further clarify the status of sepsis.  Please indicate status of the patient's sepsis diagnosis in your progress notes and discharge summary as : Sepsis treatment continues, or Sepsis resolving, or Sepsis resolved, or Sepsis ruled-out.       STATUS:  Sepsis POA ruled in  Sepsis POA is resolving  Sepsis ruled out  Early Sepsis POA, resolved?  Unable to determine  Not clinically significant  Other ( Please specify)    CHART DOCUMENTATION:    WBC Count: 5.77 K/uL (09.07.21 @ 10:42)   WBC Count: 3.43 K/uL (09.06.21 @ 07:54)   WBC Count: 4.15 K/uL (09.05.21 @ 13:34)     Lactate, Blood: 1.3 mmol/L (09.05.21 @ 13:34)     Emergency room provider documentation:  Care Plan - Instructions:  Principal Discharge DX:	Sepsis  Secondary Diagnosis:	Acute UTI  Secondary Diagnosis:	Pneumonia due to COVID-19 virus  Secondary Diagnosis:	COPD with hypoxia.    Principal Discharge Dx Sepsis.         History and physical documentation on 9/5/2021:  T(C): 37.4 (05 Sep 2021 20:43), Max: 39.8 (05 Sep 2021 13:37)  T(F): 99.3 (05 Sep 2021 20:43), Max: 103.6 (05 Sep 2021 13:37)  HR: 96 (05 Sep 2021 20:43) (93 - 108)  BP: 129/93 (05 Sep 2021 20:43) (111/77 - 136/72)  BP(mean): 104 (05 Sep 2021 20:43) (78 - 104)  RR: 20 (05 Sep 2021 20:43) (18 - 29)    # Acute Respiratory Failure  -pt covid positive and unvaccinated  -on supplemental oxygen  -give decadron and remdesivir  -tylenol prn  SpO2: 95% (05 Sep 2021 20:43) (88%

## 2021-09-07 NOTE — PHYSICAL THERAPY INITIAL EVALUATION ADULT - MARITAL STATUS
resides with  kaye in [place] ; he is also currently hospitalized due to Covid+/ resides with  Rhys in [place] ; he is also currently hospitalized due to Covid+/

## 2021-09-07 NOTE — PHYSICAL THERAPY INITIAL EVALUATION ADULT - SITTING BALANCE: STATIC
listing posterior and to R side ,required external support,cues for correction ; fearful of falling ,likes to have hand-holds on SRs or RW or PTs arm/fair minus

## 2021-09-07 NOTE — PHYSICAL THERAPY INITIAL EVALUATION ADULT - PATIENT/FAMILY/SIGNIFICANT OTHER GOALS STATEMENT, PT EVAL
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

## 2021-09-07 NOTE — PHYSICAL THERAPY INITIAL EVALUATION ADULT - GAIT DISTANCE, PT EVAL
takes side-steps at bedside to LEFT in 2 bouts to approach head of bed ,then completed bed to chair transfer with RW and minAx1 ,continuous O2 via NC 2L/min/bed to chair

## 2021-09-07 NOTE — PHYSICAL THERAPY INITIAL EVALUATION ADULT - SKIN MOISTURE
+wet with urine despite Primafit soaking bed pad,and c/o buttocks discomfort associated with positioning in bed/moist

## 2021-09-07 NOTE — PHYSICAL THERAPY INITIAL EVALUATION ADULT - PRECAUTIONS/LIMITATIONS, REHAB EVAL
h/o gait instability/imbalance,+ Airborne /Contact precautions due to +Covid PCR 9/5/21/fall precautions/isolation precautions/oxygen therapy device and L/min

## 2021-09-07 NOTE — PHYSICAL THERAPY INITIAL EVALUATION ADULT - LEVEL OF INDEPENDENCE: STAND/SIT, REHAB EVAL
returned to sitting on bed first 4 trials then is out of bed to chair at bedside with alarm enabled ; NA Ruddy aware,request video moniter if available & HEPA filter for room/contact guard/minimum assist (75% patients effort)

## 2021-09-07 NOTE — PHYSICAL THERAPY INITIAL EVALUATION ADULT - IMPAIRMENTS FOUND, PT EVAL
arousal, attention, and cognition/cognitive impairment/decreased midline orientation/gait, locomotion, and balance/muscle strength/poor safety awareness

## 2021-09-07 NOTE — PHYSICAL THERAPY INITIAL EVALUATION ADULT - LEVEL OF INDEPENDENCE: SUPINE/SIT, REHAB EVAL
+ seated imbalamce,+fear of falling ,listing posteriorly and to R side/moderate assist (50% patients effort)

## 2021-09-07 NOTE — PHYSICAL THERAPY INITIAL EVALUATION ADULT - LEVEL OF INDEPENDENCE: SIT/STAND, REHAB EVAL
pt is in habit of counting 1-2-3 before coming to stand at  walker/moderate assist (50% patients effort)

## 2021-09-08 LAB
ALBUMIN SERPL ELPH-MCNC: 2.1 G/DL — LOW (ref 3.3–5)
ALBUMIN SERPL ELPH-MCNC: 2.1 G/DL — LOW (ref 3.3–5)
ALP SERPL-CCNC: 58 U/L — SIGNIFICANT CHANGE UP (ref 40–120)
ALP SERPL-CCNC: 59 U/L — SIGNIFICANT CHANGE UP (ref 40–120)
ALT FLD-CCNC: 18 U/L — SIGNIFICANT CHANGE UP (ref 12–78)
ALT FLD-CCNC: 19 U/L — SIGNIFICANT CHANGE UP (ref 12–78)
ANION GAP SERPL CALC-SCNC: 4 MMOL/L — LOW (ref 5–17)
AST SERPL-CCNC: 21 U/L — SIGNIFICANT CHANGE UP (ref 15–37)
AST SERPL-CCNC: 21 U/L — SIGNIFICANT CHANGE UP (ref 15–37)
BASOPHILS # BLD AUTO: 0 K/UL — SIGNIFICANT CHANGE UP (ref 0–0.2)
BASOPHILS NFR BLD AUTO: 0 % — SIGNIFICANT CHANGE UP (ref 0–2)
BILIRUB DIRECT SERPL-MCNC: 0.2 MG/DL — SIGNIFICANT CHANGE UP (ref 0–0.2)
BILIRUB INDIRECT FLD-MCNC: 0.2 MG/DL — SIGNIFICANT CHANGE UP (ref 0.2–1)
BILIRUB SERPL-MCNC: 0.4 MG/DL — SIGNIFICANT CHANGE UP (ref 0.2–1.2)
BILIRUB SERPL-MCNC: 0.4 MG/DL — SIGNIFICANT CHANGE UP (ref 0.2–1.2)
BUN SERPL-MCNC: 21 MG/DL — SIGNIFICANT CHANGE UP (ref 7–23)
CALCIUM SERPL-MCNC: 8.6 MG/DL — SIGNIFICANT CHANGE UP (ref 8.5–10.1)
CHLORIDE SERPL-SCNC: 104 MMOL/L — SIGNIFICANT CHANGE UP (ref 96–108)
CO2 SERPL-SCNC: 29 MMOL/L — SIGNIFICANT CHANGE UP (ref 22–31)
CREAT SERPL-MCNC: 0.66 MG/DL — SIGNIFICANT CHANGE UP (ref 0.5–1.3)
EOSINOPHIL # BLD AUTO: 0 K/UL — SIGNIFICANT CHANGE UP (ref 0–0.5)
EOSINOPHIL NFR BLD AUTO: 0 % — SIGNIFICANT CHANGE UP (ref 0–6)
GLUCOSE BLDC GLUCOMTR-MCNC: 226 MG/DL — HIGH (ref 70–99)
GLUCOSE BLDC GLUCOMTR-MCNC: 300 MG/DL — HIGH (ref 70–99)
GLUCOSE BLDC GLUCOMTR-MCNC: 352 MG/DL — HIGH (ref 70–99)
GLUCOSE BLDC GLUCOMTR-MCNC: 394 MG/DL — HIGH (ref 70–99)
GLUCOSE SERPL-MCNC: 306 MG/DL — HIGH (ref 70–99)
HCT VFR BLD CALC: 39.2 % — SIGNIFICANT CHANGE UP (ref 34.5–45)
HGB BLD-MCNC: 12.9 G/DL — SIGNIFICANT CHANGE UP (ref 11.5–15.5)
IMM GRANULOCYTES NFR BLD AUTO: 0.5 % — SIGNIFICANT CHANGE UP (ref 0–1.5)
INR BLD: 1.43 RATIO — HIGH (ref 0.88–1.16)
LYMPHOCYTES # BLD AUTO: 0.41 K/UL — LOW (ref 1–3.3)
LYMPHOCYTES # BLD AUTO: 9.5 % — LOW (ref 13–44)
MAGNESIUM SERPL-MCNC: 2.1 MG/DL — SIGNIFICANT CHANGE UP (ref 1.6–2.6)
MCHC RBC-ENTMCNC: 29.9 PG — SIGNIFICANT CHANGE UP (ref 27–34)
MCHC RBC-ENTMCNC: 32.9 GM/DL — SIGNIFICANT CHANGE UP (ref 32–36)
MCV RBC AUTO: 90.7 FL — SIGNIFICANT CHANGE UP (ref 80–100)
MONOCYTES # BLD AUTO: 0.52 K/UL — SIGNIFICANT CHANGE UP (ref 0–0.9)
MONOCYTES NFR BLD AUTO: 12.1 % — SIGNIFICANT CHANGE UP (ref 2–14)
NEUTROPHILS # BLD AUTO: 3.36 K/UL — SIGNIFICANT CHANGE UP (ref 1.8–7.4)
NEUTROPHILS NFR BLD AUTO: 77.9 % — HIGH (ref 43–77)
PHOSPHATE SERPL-MCNC: 2.1 MG/DL — LOW (ref 2.5–4.5)
PLATELET # BLD AUTO: 165 K/UL — SIGNIFICANT CHANGE UP (ref 150–400)
POTASSIUM SERPL-MCNC: 4.5 MMOL/L — SIGNIFICANT CHANGE UP (ref 3.5–5.3)
POTASSIUM SERPL-SCNC: 4.5 MMOL/L — SIGNIFICANT CHANGE UP (ref 3.5–5.3)
PROT SERPL-MCNC: 5.7 GM/DL — LOW (ref 6–8.3)
PROT SERPL-MCNC: 5.8 GM/DL — LOW (ref 6–8.3)
PROTHROM AB SERPL-ACNC: 16.5 SEC — HIGH (ref 10.6–13.6)
RBC # BLD: 4.32 M/UL — SIGNIFICANT CHANGE UP (ref 3.8–5.2)
RBC # FLD: 12.7 % — SIGNIFICANT CHANGE UP (ref 10.3–14.5)
SODIUM SERPL-SCNC: 137 MMOL/L — SIGNIFICANT CHANGE UP (ref 135–145)
WBC # BLD: 4.31 K/UL — SIGNIFICANT CHANGE UP (ref 3.8–10.5)
WBC # FLD AUTO: 4.31 K/UL — SIGNIFICANT CHANGE UP (ref 3.8–10.5)

## 2021-09-08 PROCEDURE — 99232 SBSQ HOSP IP/OBS MODERATE 35: CPT

## 2021-09-08 RX ORDER — SODIUM CHLORIDE 9 MG/ML
500 INJECTION INTRAMUSCULAR; INTRAVENOUS; SUBCUTANEOUS
Refills: 0 | Status: DISCONTINUED | OUTPATIENT
Start: 2021-09-08 | End: 2021-09-08

## 2021-09-08 RX ORDER — SODIUM,POTASSIUM PHOSPHATES 278-250MG
1 POWDER IN PACKET (EA) ORAL
Refills: 0 | Status: COMPLETED | OUTPATIENT
Start: 2021-09-08 | End: 2021-09-10

## 2021-09-08 RX ORDER — INSULIN GLARGINE 100 [IU]/ML
30 INJECTION, SOLUTION SUBCUTANEOUS
Refills: 0 | Status: DISCONTINUED | OUTPATIENT
Start: 2021-09-08 | End: 2021-09-09

## 2021-09-08 RX ADMIN — Medication 5: at 17:48

## 2021-09-08 RX ADMIN — Medication 81 MILLIGRAM(S): at 10:07

## 2021-09-08 RX ADMIN — REMDESIVIR 540 MILLIGRAM(S): 5 INJECTION INTRAVENOUS at 17:48

## 2021-09-08 RX ADMIN — Medication 6 MILLIGRAM(S): at 10:06

## 2021-09-08 RX ADMIN — SODIUM CHLORIDE 500 MILLILITER(S): 9 INJECTION INTRAMUSCULAR; INTRAVENOUS; SUBCUTANEOUS at 16:20

## 2021-09-08 RX ADMIN — POLYETHYLENE GLYCOL 3350 17 GRAM(S): 17 POWDER, FOR SOLUTION ORAL at 10:06

## 2021-09-08 RX ADMIN — Medication 1 PACKET(S): at 15:37

## 2021-09-08 RX ADMIN — INSULIN GLARGINE 25 UNIT(S): 100 INJECTION, SOLUTION SUBCUTANEOUS at 08:23

## 2021-09-08 RX ADMIN — Medication 3 MILLIGRAM(S): at 21:57

## 2021-09-08 RX ADMIN — INSULIN GLARGINE 25 UNIT(S): 100 INJECTION, SOLUTION SUBCUTANEOUS at 21:57

## 2021-09-08 RX ADMIN — Medication 3: at 11:51

## 2021-09-08 RX ADMIN — ENOXAPARIN SODIUM 40 MILLIGRAM(S): 100 INJECTION SUBCUTANEOUS at 10:06

## 2021-09-08 RX ADMIN — ATORVASTATIN CALCIUM 20 MILLIGRAM(S): 80 TABLET, FILM COATED ORAL at 21:57

## 2021-09-08 RX ADMIN — Medication 10 MILLIGRAM(S): at 10:06

## 2021-09-08 RX ADMIN — Medication 2: at 08:24

## 2021-09-08 NOTE — PHARMACOTHERAPY INTERVENTION NOTE - COMMENTS
Patient with uncomplicated cystitis has been afebrile with normal WBC for 2 days now. The urine culture from 9/5/2021 showed a contamination. The patient completed 3 days of Ceftriaxone. Prescriber was contacted to consider discontinuing Ceftriaxone. Prescriber agreed given that the patient has completed a course of 3-days.

## 2021-09-09 LAB
ALBUMIN SERPL ELPH-MCNC: 2.1 G/DL — LOW (ref 3.3–5)
ALBUMIN SERPL ELPH-MCNC: 2.2 G/DL — LOW (ref 3.3–5)
ALP SERPL-CCNC: 66 U/L — SIGNIFICANT CHANGE UP (ref 40–120)
ALP SERPL-CCNC: 68 U/L — SIGNIFICANT CHANGE UP (ref 40–120)
ALT FLD-CCNC: 26 U/L — SIGNIFICANT CHANGE UP (ref 12–78)
ALT FLD-CCNC: 27 U/L — SIGNIFICANT CHANGE UP (ref 12–78)
ANION GAP SERPL CALC-SCNC: 6 MMOL/L — SIGNIFICANT CHANGE UP (ref 5–17)
AST SERPL-CCNC: 25 U/L — SIGNIFICANT CHANGE UP (ref 15–37)
AST SERPL-CCNC: 26 U/L — SIGNIFICANT CHANGE UP (ref 15–37)
BASOPHILS # BLD AUTO: 0 K/UL — SIGNIFICANT CHANGE UP (ref 0–0.2)
BASOPHILS NFR BLD AUTO: 0 % — SIGNIFICANT CHANGE UP (ref 0–2)
BILIRUB DIRECT SERPL-MCNC: 0.1 MG/DL — SIGNIFICANT CHANGE UP (ref 0–0.2)
BILIRUB INDIRECT FLD-MCNC: 0.3 MG/DL — SIGNIFICANT CHANGE UP (ref 0.2–1)
BILIRUB SERPL-MCNC: 0.4 MG/DL — SIGNIFICANT CHANGE UP (ref 0.2–1.2)
BILIRUB SERPL-MCNC: 0.4 MG/DL — SIGNIFICANT CHANGE UP (ref 0.2–1.2)
BUN SERPL-MCNC: 18 MG/DL — SIGNIFICANT CHANGE UP (ref 7–23)
CALCIUM SERPL-MCNC: 8.1 MG/DL — LOW (ref 8.5–10.1)
CHLORIDE SERPL-SCNC: 103 MMOL/L — SIGNIFICANT CHANGE UP (ref 96–108)
CO2 SERPL-SCNC: 27 MMOL/L — SIGNIFICANT CHANGE UP (ref 22–31)
CREAT SERPL-MCNC: 0.59 MG/DL — SIGNIFICANT CHANGE UP (ref 0.5–1.3)
EOSINOPHIL # BLD AUTO: 0 K/UL — SIGNIFICANT CHANGE UP (ref 0–0.5)
EOSINOPHIL NFR BLD AUTO: 0 % — SIGNIFICANT CHANGE UP (ref 0–6)
GLUCOSE BLDC GLUCOMTR-MCNC: 235 MG/DL — HIGH (ref 70–99)
GLUCOSE BLDC GLUCOMTR-MCNC: 238 MG/DL — HIGH (ref 70–99)
GLUCOSE BLDC GLUCOMTR-MCNC: 287 MG/DL — HIGH (ref 70–99)
GLUCOSE BLDC GLUCOMTR-MCNC: 336 MG/DL — HIGH (ref 70–99)
GLUCOSE SERPL-MCNC: 277 MG/DL — HIGH (ref 70–99)
HCT VFR BLD CALC: 40.1 % — SIGNIFICANT CHANGE UP (ref 34.5–45)
HGB BLD-MCNC: 13.3 G/DL — SIGNIFICANT CHANGE UP (ref 11.5–15.5)
IMM GRANULOCYTES NFR BLD AUTO: 0.4 % — SIGNIFICANT CHANGE UP (ref 0–1.5)
LYMPHOCYTES # BLD AUTO: 0.69 K/UL — LOW (ref 1–3.3)
LYMPHOCYTES # BLD AUTO: 14.2 % — SIGNIFICANT CHANGE UP (ref 13–44)
MAGNESIUM SERPL-MCNC: 2 MG/DL — SIGNIFICANT CHANGE UP (ref 1.6–2.6)
MCHC RBC-ENTMCNC: 30.3 PG — SIGNIFICANT CHANGE UP (ref 27–34)
MCHC RBC-ENTMCNC: 33.2 GM/DL — SIGNIFICANT CHANGE UP (ref 32–36)
MCV RBC AUTO: 91.3 FL — SIGNIFICANT CHANGE UP (ref 80–100)
MONOCYTES # BLD AUTO: 0.47 K/UL — SIGNIFICANT CHANGE UP (ref 0–0.9)
MONOCYTES NFR BLD AUTO: 9.7 % — SIGNIFICANT CHANGE UP (ref 2–14)
NEUTROPHILS # BLD AUTO: 3.68 K/UL — SIGNIFICANT CHANGE UP (ref 1.8–7.4)
NEUTROPHILS NFR BLD AUTO: 75.7 % — SIGNIFICANT CHANGE UP (ref 43–77)
PHOSPHATE SERPL-MCNC: 2.7 MG/DL — SIGNIFICANT CHANGE UP (ref 2.5–4.5)
PLATELET # BLD AUTO: 189 K/UL — SIGNIFICANT CHANGE UP (ref 150–400)
POTASSIUM SERPL-MCNC: 4.2 MMOL/L — SIGNIFICANT CHANGE UP (ref 3.5–5.3)
POTASSIUM SERPL-SCNC: 4.2 MMOL/L — SIGNIFICANT CHANGE UP (ref 3.5–5.3)
PROT SERPL-MCNC: 5.8 GM/DL — LOW (ref 6–8.3)
PROT SERPL-MCNC: 5.9 GM/DL — LOW (ref 6–8.3)
RBC # BLD: 4.39 M/UL — SIGNIFICANT CHANGE UP (ref 3.8–5.2)
RBC # FLD: 12.8 % — SIGNIFICANT CHANGE UP (ref 10.3–14.5)
SODIUM SERPL-SCNC: 136 MMOL/L — SIGNIFICANT CHANGE UP (ref 135–145)
WBC # BLD: 4.86 K/UL — SIGNIFICANT CHANGE UP (ref 3.8–10.5)
WBC # FLD AUTO: 4.86 K/UL — SIGNIFICANT CHANGE UP (ref 3.8–10.5)

## 2021-09-09 PROCEDURE — 99232 SBSQ HOSP IP/OBS MODERATE 35: CPT

## 2021-09-09 RX ORDER — INSULIN GLARGINE 100 [IU]/ML
35 INJECTION, SOLUTION SUBCUTANEOUS
Refills: 0 | Status: DISCONTINUED | OUTPATIENT
Start: 2021-09-09 | End: 2021-09-10

## 2021-09-09 RX ADMIN — Medication 1 PACKET(S): at 15:50

## 2021-09-09 RX ADMIN — REMDESIVIR 540 MILLIGRAM(S): 5 INJECTION INTRAVENOUS at 17:06

## 2021-09-09 RX ADMIN — ENOXAPARIN SODIUM 40 MILLIGRAM(S): 100 INJECTION SUBCUTANEOUS at 09:48

## 2021-09-09 RX ADMIN — INSULIN GLARGINE 30 UNIT(S): 100 INJECTION, SOLUTION SUBCUTANEOUS at 08:18

## 2021-09-09 RX ADMIN — ATORVASTATIN CALCIUM 20 MILLIGRAM(S): 80 TABLET, FILM COATED ORAL at 22:56

## 2021-09-09 RX ADMIN — Medication 2: at 08:18

## 2021-09-09 RX ADMIN — Medication 81 MILLIGRAM(S): at 12:13

## 2021-09-09 RX ADMIN — Medication 6 MILLIGRAM(S): at 09:48

## 2021-09-09 RX ADMIN — INSULIN GLARGINE 35 UNIT(S): 100 INJECTION, SOLUTION SUBCUTANEOUS at 22:56

## 2021-09-09 RX ADMIN — Medication 10 MILLIGRAM(S): at 09:49

## 2021-09-09 RX ADMIN — Medication 3 MILLIGRAM(S): at 22:56

## 2021-09-09 RX ADMIN — Medication 3: at 12:13

## 2021-09-09 RX ADMIN — Medication 1 PACKET(S): at 12:13

## 2021-09-09 RX ADMIN — Medication 1 PACKET(S): at 06:46

## 2021-09-09 RX ADMIN — Medication 4: at 17:04

## 2021-09-09 RX ADMIN — POLYETHYLENE GLYCOL 3350 17 GRAM(S): 17 POWDER, FOR SOLUTION ORAL at 09:48

## 2021-09-10 ENCOUNTER — TRANSCRIPTION ENCOUNTER (OUTPATIENT)
Age: 76
End: 2021-09-10

## 2021-09-10 LAB
ANION GAP SERPL CALC-SCNC: 3 MMOL/L — LOW (ref 5–17)
BUN SERPL-MCNC: 16 MG/DL — SIGNIFICANT CHANGE UP (ref 7–23)
CALCIUM SERPL-MCNC: 8.6 MG/DL — SIGNIFICANT CHANGE UP (ref 8.5–10.1)
CHLORIDE SERPL-SCNC: 103 MMOL/L — SIGNIFICANT CHANGE UP (ref 96–108)
CO2 SERPL-SCNC: 30 MMOL/L — SIGNIFICANT CHANGE UP (ref 22–31)
CREAT SERPL-MCNC: 0.48 MG/DL — LOW (ref 0.5–1.3)
CULTURE RESULTS: SIGNIFICANT CHANGE UP
CULTURE RESULTS: SIGNIFICANT CHANGE UP
GLUCOSE BLDC GLUCOMTR-MCNC: 195 MG/DL — HIGH (ref 70–99)
GLUCOSE BLDC GLUCOMTR-MCNC: 299 MG/DL — HIGH (ref 70–99)
GLUCOSE BLDC GLUCOMTR-MCNC: 301 MG/DL — HIGH (ref 70–99)
GLUCOSE BLDC GLUCOMTR-MCNC: 366 MG/DL — HIGH (ref 70–99)
GLUCOSE SERPL-MCNC: 207 MG/DL — HIGH (ref 70–99)
INR BLD: 1.27 RATIO — HIGH (ref 0.88–1.16)
POTASSIUM SERPL-MCNC: 5.1 MMOL/L — SIGNIFICANT CHANGE UP (ref 3.5–5.3)
POTASSIUM SERPL-SCNC: 5.1 MMOL/L — SIGNIFICANT CHANGE UP (ref 3.5–5.3)
PROTHROM AB SERPL-ACNC: 14.7 SEC — HIGH (ref 10.6–13.6)
SODIUM SERPL-SCNC: 136 MMOL/L — SIGNIFICANT CHANGE UP (ref 135–145)
SPECIMEN SOURCE: SIGNIFICANT CHANGE UP
SPECIMEN SOURCE: SIGNIFICANT CHANGE UP

## 2021-09-10 PROCEDURE — 99232 SBSQ HOSP IP/OBS MODERATE 35: CPT

## 2021-09-10 RX ORDER — INSULIN GLARGINE 100 [IU]/ML
40 INJECTION, SOLUTION SUBCUTANEOUS
Refills: 0 | Status: DISCONTINUED | OUTPATIENT
Start: 2021-09-10 | End: 2021-09-12

## 2021-09-10 RX ADMIN — INSULIN GLARGINE 40 UNIT(S): 100 INJECTION, SOLUTION SUBCUTANEOUS at 21:28

## 2021-09-10 RX ADMIN — Medication 1 PACKET(S): at 06:25

## 2021-09-10 RX ADMIN — Medication 1 PACKET(S): at 11:37

## 2021-09-10 RX ADMIN — POLYETHYLENE GLYCOL 3350 17 GRAM(S): 17 POWDER, FOR SOLUTION ORAL at 11:36

## 2021-09-10 RX ADMIN — Medication 81 MILLIGRAM(S): at 11:36

## 2021-09-10 RX ADMIN — INSULIN GLARGINE 35 UNIT(S): 100 INJECTION, SOLUTION SUBCUTANEOUS at 08:17

## 2021-09-10 RX ADMIN — Medication 4: at 17:37

## 2021-09-10 RX ADMIN — Medication 5: at 11:37

## 2021-09-10 RX ADMIN — Medication 1: at 08:18

## 2021-09-10 RX ADMIN — ATORVASTATIN CALCIUM 20 MILLIGRAM(S): 80 TABLET, FILM COATED ORAL at 21:28

## 2021-09-10 RX ADMIN — Medication 6 MILLIGRAM(S): at 11:35

## 2021-09-10 RX ADMIN — ENOXAPARIN SODIUM 40 MILLIGRAM(S): 100 INJECTION SUBCUTANEOUS at 11:35

## 2021-09-10 RX ADMIN — Medication 10 MILLIGRAM(S): at 11:36

## 2021-09-10 NOTE — DISCHARGE NOTE NURSING/CASE MANAGEMENT/SOCIAL WORK - PATIENT PORTAL LINK FT
You can access the FollowMyHealth Patient Portal offered by [facility] by registering at the following website: [email or web address]. By joining [facility]’s FollowMyHealth portal, you will also be able to view your health information using other applications (apps) compatible with our system.

## 2021-09-11 LAB
ALBUMIN SERPL ELPH-MCNC: 2.2 G/DL — LOW (ref 3.3–5)
ALP SERPL-CCNC: 72 U/L — SIGNIFICANT CHANGE UP (ref 40–120)
ALT FLD-CCNC: 23 U/L — SIGNIFICANT CHANGE UP (ref 12–78)
AST SERPL-CCNC: 17 U/L — SIGNIFICANT CHANGE UP (ref 15–37)
BILIRUB DIRECT SERPL-MCNC: 0.2 MG/DL — SIGNIFICANT CHANGE UP (ref 0–0.2)
BILIRUB INDIRECT FLD-MCNC: 0.4 MG/DL — SIGNIFICANT CHANGE UP (ref 0.2–1)
BILIRUB SERPL-MCNC: 0.6 MG/DL — SIGNIFICANT CHANGE UP (ref 0.2–1.2)
CREAT SERPL-MCNC: 0.54 MG/DL — SIGNIFICANT CHANGE UP (ref 0.5–1.3)
GLUCOSE BLDC GLUCOMTR-MCNC: 172 MG/DL — HIGH (ref 70–99)
INR BLD: 1.28 RATIO — HIGH (ref 0.88–1.16)
PROT SERPL-MCNC: 5.8 GM/DL — LOW (ref 6–8.3)
PROTHROM AB SERPL-ACNC: 14.7 SEC — HIGH (ref 10.6–13.6)

## 2021-09-11 PROCEDURE — 99232 SBSQ HOSP IP/OBS MODERATE 35: CPT

## 2021-09-11 RX ADMIN — POLYETHYLENE GLYCOL 3350 17 GRAM(S): 17 POWDER, FOR SOLUTION ORAL at 09:30

## 2021-09-11 RX ADMIN — ENOXAPARIN SODIUM 40 MILLIGRAM(S): 100 INJECTION SUBCUTANEOUS at 09:30

## 2021-09-11 RX ADMIN — Medication 1: at 08:12

## 2021-09-11 RX ADMIN — INSULIN GLARGINE 40 UNIT(S): 100 INJECTION, SOLUTION SUBCUTANEOUS at 08:13

## 2021-09-11 RX ADMIN — ATORVASTATIN CALCIUM 20 MILLIGRAM(S): 80 TABLET, FILM COATED ORAL at 21:47

## 2021-09-11 RX ADMIN — Medication 1: at 12:14

## 2021-09-11 RX ADMIN — Medication 81 MILLIGRAM(S): at 09:30

## 2021-09-11 RX ADMIN — Medication 3 MILLIGRAM(S): at 21:48

## 2021-09-11 RX ADMIN — Medication 1: at 17:20

## 2021-09-12 LAB
ALBUMIN SERPL ELPH-MCNC: 2.3 G/DL — LOW (ref 3.3–5)
ALP SERPL-CCNC: 77 U/L — SIGNIFICANT CHANGE UP (ref 40–120)
ALT FLD-CCNC: 23 U/L — SIGNIFICANT CHANGE UP (ref 12–78)
AST SERPL-CCNC: 24 U/L — SIGNIFICANT CHANGE UP (ref 15–37)
BILIRUB DIRECT SERPL-MCNC: 0.2 MG/DL — SIGNIFICANT CHANGE UP (ref 0–0.2)
BILIRUB INDIRECT FLD-MCNC: 0.5 MG/DL — SIGNIFICANT CHANGE UP (ref 0.2–1)
BILIRUB SERPL-MCNC: 0.7 MG/DL — SIGNIFICANT CHANGE UP (ref 0.2–1.2)
CREAT SERPL-MCNC: 0.6 MG/DL — SIGNIFICANT CHANGE UP (ref 0.5–1.3)
INR BLD: 1.17 RATIO — HIGH (ref 0.88–1.16)
PROT SERPL-MCNC: 6.2 GM/DL — SIGNIFICANT CHANGE UP (ref 6–8.3)
PROTHROM AB SERPL-ACNC: 13.6 SEC — SIGNIFICANT CHANGE UP (ref 10.6–13.6)

## 2021-09-12 PROCEDURE — 99232 SBSQ HOSP IP/OBS MODERATE 35: CPT

## 2021-09-12 RX ORDER — DEXTROSE 50 % IN WATER 50 %
15 SYRINGE (ML) INTRAVENOUS ONCE
Refills: 0 | Status: COMPLETED | OUTPATIENT
Start: 2021-09-12 | End: 2021-09-12

## 2021-09-12 RX ORDER — INSULIN GLARGINE 100 [IU]/ML
20 INJECTION, SOLUTION SUBCUTANEOUS
Refills: 0 | Status: DISCONTINUED | OUTPATIENT
Start: 2021-09-12 | End: 2021-09-14

## 2021-09-12 RX ADMIN — Medication 15 GRAM(S): at 08:47

## 2021-09-12 RX ADMIN — ATORVASTATIN CALCIUM 20 MILLIGRAM(S): 80 TABLET, FILM COATED ORAL at 21:21

## 2021-09-12 RX ADMIN — Medication 1: at 12:01

## 2021-09-12 RX ADMIN — ENOXAPARIN SODIUM 40 MILLIGRAM(S): 100 INJECTION SUBCUTANEOUS at 09:37

## 2021-09-12 RX ADMIN — Medication 81 MILLIGRAM(S): at 09:37

## 2021-09-12 RX ADMIN — INSULIN GLARGINE 20 UNIT(S): 100 INJECTION, SOLUTION SUBCUTANEOUS at 21:21

## 2021-09-13 ENCOUNTER — TRANSCRIPTION ENCOUNTER (OUTPATIENT)
Age: 76
End: 2021-09-13

## 2021-09-13 LAB
ALBUMIN SERPL ELPH-MCNC: 2.4 G/DL — LOW (ref 3.3–5)
ALP SERPL-CCNC: 86 U/L — SIGNIFICANT CHANGE UP (ref 40–120)
ALT FLD-CCNC: 21 U/L — SIGNIFICANT CHANGE UP (ref 12–78)
AST SERPL-CCNC: 23 U/L — SIGNIFICANT CHANGE UP (ref 15–37)
BILIRUB DIRECT SERPL-MCNC: 0.2 MG/DL — SIGNIFICANT CHANGE UP (ref 0–0.2)
BILIRUB INDIRECT FLD-MCNC: 0.5 MG/DL — SIGNIFICANT CHANGE UP (ref 0.2–1)
BILIRUB SERPL-MCNC: 0.7 MG/DL — SIGNIFICANT CHANGE UP (ref 0.2–1.2)
CREAT SERPL-MCNC: 0.53 MG/DL — SIGNIFICANT CHANGE UP (ref 0.5–1.3)
INR BLD: 1.21 RATIO — HIGH (ref 0.88–1.16)
PROT SERPL-MCNC: 6.2 GM/DL — SIGNIFICANT CHANGE UP (ref 6–8.3)
PROTHROM AB SERPL-ACNC: 13.9 SEC — HIGH (ref 10.6–13.6)

## 2021-09-13 PROCEDURE — 99232 SBSQ HOSP IP/OBS MODERATE 35: CPT

## 2021-09-13 RX ORDER — ENOXAPARIN SODIUM 100 MG/ML
10 INJECTION SUBCUTANEOUS
Qty: 1 | Refills: 1
Start: 2021-09-13 | End: 2021-11-11

## 2021-09-13 RX ORDER — INSULIN DETEMIR 100/ML (3)
15 INSULIN PEN (ML) SUBCUTANEOUS
Qty: 0 | Refills: 0 | DISCHARGE

## 2021-09-13 RX ORDER — INSULIN HUMAN 100 [IU]/ML
0 INJECTION, SOLUTION SUBCUTANEOUS
Qty: 0 | Refills: 0 | DISCHARGE

## 2021-09-13 RX ADMIN — Medication 1: at 12:34

## 2021-09-13 RX ADMIN — INSULIN GLARGINE 20 UNIT(S): 100 INJECTION, SOLUTION SUBCUTANEOUS at 21:41

## 2021-09-13 RX ADMIN — POLYETHYLENE GLYCOL 3350 17 GRAM(S): 17 POWDER, FOR SOLUTION ORAL at 08:46

## 2021-09-13 RX ADMIN — INSULIN GLARGINE 20 UNIT(S): 100 INJECTION, SOLUTION SUBCUTANEOUS at 08:45

## 2021-09-13 RX ADMIN — ATORVASTATIN CALCIUM 20 MILLIGRAM(S): 80 TABLET, FILM COATED ORAL at 21:41

## 2021-09-13 RX ADMIN — Medication 81 MILLIGRAM(S): at 12:34

## 2021-09-13 RX ADMIN — Medication 10 MILLIGRAM(S): at 08:46

## 2021-09-13 RX ADMIN — ENOXAPARIN SODIUM 40 MILLIGRAM(S): 100 INJECTION SUBCUTANEOUS at 08:46

## 2021-09-13 NOTE — PROVIDER CONTACT NOTE (OTHER) - SITUATION
Dr. Garcia will make appointment with pt family when pt is home
Patient had an initial blood sugar of 58, rechecked a different finger and blood sugar was 91.

## 2021-09-13 NOTE — DISCHARGE NOTE PROVIDER - NSDCMRMEDTOKEN_GEN_ALL_CORE_FT
acetaminophen 325 mg oral tablet: 2 tab(s) orally every 6 hours, As needed, Mild Pain (1 - 3)  aspirin 81 mg oral tablet, chewable: 1 tab(s) orally once a day  atorvastatin 20 mg oral tablet: 1 tab(s) orally once a day (at bedtime)  bisacodyl 10 mg rectal suppository: 1 suppository(ies) rectal once a day  Insulin Pen Needles, 4mm: 1 application subcutaneously 2 times a day . ** Use with insulin pen **   lancets: 1 application subcutaneously 2 times a day   Lantus Solostar Pen 100 units/mL subcutaneous solution: 10 unit(s) subcutaneous 2 times a day   Sodium Chloride 1 g oral tablet: 1 tab(s) orally 2 times a day  test strips (per patient&#x27;s insurance): 1 application subcutaneously 2 times a day . ** Compatible with patient&#x27;s glucometer **    acetaminophen 325 mg oral tablet: 2 tab(s) orally every 6 hours, As needed, Mild Pain (1 - 3)  aspirin 81 mg oral tablet, chewable: 1 tab(s) orally once a day  atorvastatin 20 mg oral tablet: 1 tab(s) orally once a day (at bedtime)  bisacodyl 10 mg rectal suppository: 1 suppository(ies) rectal once a day  glucometer (per patient&#x27;s insurance): Test blood sugars four times a day. Dispense #1 glucometer. ICD code E11.9  lancets: 1 application subcutaneously 3 times a day  ICD code E11.9  Lantus Solostar Pen 100 units/mL subcutaneous solution: 10 unit(s) subcutaneous once a day (at bedtime)   test strips (per patient&#x27;s insurance): 1 application subcutaneously 3 times a day . ** Compatible with patient&#x27;s glucometer **  ICD code E11.9   acetaminophen 325 mg oral tablet: 2 tab(s) orally every 6 hours, As needed, Mild Pain (1 - 3)  aspirin 81 mg oral tablet, chewable: 1 tab(s) orally once a day  atorvastatin 20 mg oral tablet: 1 tab(s) orally once a day (at bedtime)  bisacodyl 10 mg rectal suppository: 1 suppository(ies) rectal once a day  glucometer (per patient&#x27;s insurance): Test blood sugars four times a day. Dispense #1 glucometer. ICD code E11.9  lancets: 1 application subcutaneously 3 times a day  ICD code E11.9  Levemir 100 units/mL subcutaneous solution: 10 unit(s) subcutaneous once a day (at bedtime)

## 2021-09-13 NOTE — DISCHARGE NOTE PROVIDER - NSDCCPCAREPLAN_GEN_ALL_CORE_FT
PRINCIPAL DISCHARGE DIAGNOSIS  Diagnosis: Pneumonia due to COVID-19 virus  Assessment and Plan of Treatment:   *completed treatment with Remdesivir and decadron  *Outpatient follow up with primary care doctor in one week.      SECONDARY DISCHARGE DIAGNOSES  Diagnosis: Acute UTI  Assessment and Plan of Treatment:     Diagnosis: COPD with hypoxia  Assessment and Plan of Treatment:     Diagnosis: Pneumonia due to COVID-19 virus  Assessment and Plan of Treatment:      PRINCIPAL DISCHARGE DIAGNOSIS  Diagnosis: Pneumonia due to COVID-19 virus  Assessment and Plan of Treatment:   *completed treatment with Remdesivir and decadron  *Outpatient follow up with primary care doctor in one week.      SECONDARY DISCHARGE DIAGNOSES  Diagnosis: DM (diabetes mellitus), type 2, uncontrolled  Assessment and Plan of Treatment:   *Start Lantus Pen  10 units twice a day, morning and night.   *check your blood sugar before injecting , if it is over 110  you can inject your self.  always eat 3 meals a day with insulin to prevent hypoglycemia.   *if you have low blood sugar, drink orange juice     PRINCIPAL DISCHARGE DIAGNOSIS  Diagnosis: Pneumonia due to COVID-19 virus  Assessment and Plan of Treatment: *completed treatment with Remdesivir and decadron  *Outpatient follow up with primary care doctor in one week.  -IF worsening dyspnea or chest pain advise to return to the ER      SECONDARY DISCHARGE DIAGNOSES  Diagnosis: DM (diabetes mellitus), type 2, uncontrolled  Assessment and Plan of Treatment: *Start Lantus Pen  10 units at night.   *check your blood sugar before injecting , if it is over 110  you can inject your self.  always eat 3 meals a day with insulin to prevent hypoglycemia.   *if you have low blood sugar, drink orange juice     PRINCIPAL DISCHARGE DIAGNOSIS  Diagnosis: Pneumonia due to COVID-19 virus  Assessment and Plan of Treatment: *completed treatment with Remdesivir and decadron  *Outpatient follow up with primary care doctor in one week.  -IF worsening dyspnea or chest pain advise to return to the ER      SECONDARY DISCHARGE DIAGNOSES  Diagnosis: DM (diabetes mellitus), type 2, uncontrolled  Assessment and Plan of Treatment: *Start Levemir Pen  10 units at night.   *check your blood sugar before injecting , if it is over 110  you can inject your self.  always eat 3 meals a day with insulin to prevent hypoglycemia.   *if you have low blood sugar, drink orange juice

## 2021-09-13 NOTE — DISCHARGE NOTE PROVIDER - HOSPITAL COURSE
Vital Signs Last 24 Hrs  T(C): 36.6 (13 Sep 2021 07:36), Max: 36.9 (13 Sep 2021 00:09)  T(F): 97.9 (13 Sep 2021 07:36), Max: 98.5 (13 Sep 2021 00:09)  HR: 71 (13 Sep 2021 07:36) (68 - 71)  BP: 127/64 (13 Sep 2021 07:36) (115/57 - 140/64)  BP(mean): --  RR: 18 (13 Sep 2021 11:57) (18 - 18)  SpO2: 98% (13 Sep 2021 11:57) (95% - 98%)    HEENT:   pupils equal and reactive, EOMI, no oropharyngeal lesions, erythema, exudates, oral thrush    NECK:   supple, no carotid bruits, no palpable lymph nodes, no thyromegaly    CV:  +S1, +S2, regular, no murmurs or rubs    RESP:   lungs clear to auscultation bilaterally, no wheezing, rales, rhonchi, good air entry bilaterally    BREAST:  not examined    GI:  abdomen soft, non-tender, non-distended, normal BS, no bruits, no abdominal masses, no palpable masses    RECTAL:  not examined    :  not examined    MSK:   normal muscle tone, no atrophy, no rigidity, no contractions    EXT:   no clubbing, no cyanosis, no edema, no calf pain, swelling or erythema    VASCULAR:  pulses equal and symmetric in the upper and lower extremities    NEURO:  AAOX3, no focal neurological deficits, follows all commands, able to move extremities spontaneously    SKIN:  no ulcers, lesions or rashes    13 Sep 2021 08:38    x      |  x      |  x      ----------------------------<  x      x       |  x      |  0.53       TPro  6.2    /  Alb  2.4    /  TBili  0.7    /  DBili  0.2    /  AST  23     /  ALT  21     /  AlkPhos  86     13 Sep 2021 08:38  LIVER FUNCTIONS - ( 13 Sep 2021 08:38 )  Alb: 2.4 g/dL / Pro: 6.2 gm/dL / ALK PHOS: 86 U/L / ALT: 21 U/L / AST: 23 U/L / GGT: x         PT/INR - ( 13 Sep 2021 08:38 )   PT: 13.9 sec;   INR: 1.21 ratio           Hospital Course:      76 year old female patient with pertinent history of Dementia presented to the ED with mental status changes that were witnessed by her daughter.  Found to have covid19 PNA  Of note, patient is not vaccinated      #Acute Metabolic Encephalopathy Superimposed on Reported Progressive Dementia and Functional Decline  -possibly due to COVID-19 infection but unable to clinically determine.   -mentation improving  -f/u blood and urine cx. Cultures negative. Completed course of rocephin      # Sepsis with Acute Respiratory Failure due to COVID-19 Pneumonia. Reported Hypoxia in the ED prior to admission.   -pt covid positive and unvaccinated on admission  -on supplemental oxygen. Doing better on RA at rest. Home O2 evaluation ordered  -s/p decadron and remdesivir. ID consult.       # DM2 with Hyperglycemia  -Hyperglycemia augmented by steroid adminsitration for COVID-19 induced hypoxia.   -Non-compliance reported at home  -A1c 11.2%  -ISS  -on lantus. Uptitrate accordingly          Disp: patient medically cleared for discharge, completed treatment for covid 19.  no home o2 needed.   patient with dementia, debility, waiting for hospital bed  and nanda lift to be delivered.   anticipated DC monday.          HOSPITALIST PROGRESS NOTE:  SUBJECTIVE:  PCP:  Chief Complaint: Patient is a 76y old  Female who presents with a chief complaint of Encephalopathy (13 Sep 2021 13:59)      HPI:  76 year old female patient with pertinent history of Dementia presented to the ED with mental status changes that were witnessed by her daughter. Per collateral information patient with fever and mental status changes, also noted to be unsteady on her feet. Patient's primary caregiver is her  who is currently admitted. At the time of evaluation patient is alert and conversant- able to specify that her location and who she is. endorses occasional confusion. Of note, patient is not vaccinated  In the ED patient with UA consistent with a UTI. Patient positive for Covid and initially hypoxic on ED presentation.   (05 Sep 2021 20:41)    9/14:  Patient confused; has no complaints; awating for bed at home   9/15: No overnight events; seen by palliative   9/16:  Patient confused; No overnight events;       76 year old female patient with pertinent history of Dementia presented to the ED with mental status changes that were witnessed by her daughter.  Found to have covid19 PNA  Of note, patient is not vaccinated    #Acute Metabolic Encephalopathy Superimposed on Reported Progressive Dementia and Functional Decline  -possibly due to COVID-19 infection but unable to clinically determine.   -mentation improving  -f/u blood and urine cx. Cultures negative. Completed course of rocephin    # Sepsis with Acute Respiratory Failure due to COVID-19 Pneumonia. Reported Hypoxia in the ED prior to admission.   -pt covid positive and unvaccinated on admission  -on supplemental oxygen. Doing better on RA at rest. Home O2 evaluation ordered  -give decadron and remdesivir. ID consult. Ended of treatment. O2 saturations improved on RA at rest. Discontinue decadron  -tylenol prn  -Isolation precautions as per hospital protocol    # Hypovolemic Hyponatremia. Resolved  -IVF as needed for fluid balance  -Continue to monitor Na and other electrolytes    # DM2 with Hyperglycemia  -Hyperglycemia augmented by steroid adminsitration for COVID-19 induced hypoxia.   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     Patient with generalized muscle weakness with progressive deconditioning due to progressive advanced dementia and after undergoin hospitalization for COVID-19 pneumonia ,with extensive medical complications and prolonged hospitalization-pt will need hospital bed to keep bed elevated 30 degrees to assist pain and shortness of breath. Patient will also need frequent repositioning not feasible with ordinary bed In addition , Patient  unable to safely ambulate with walker, cane/crutches and will require wheelchair to access the bathroom for toileting and dining facilities. Patient is agreeable to the wheelchair and has a 24 hour assist with the wheelchair. Patient will need elevated leg rest to prevent edema.      # DVT ppx    total time 80 minutes

## 2021-09-13 NOTE — DISCHARGE NOTE PROVIDER - CARE PROVIDER_API CALL
ALDA COTTRELL  Internal Medicine  [address] [place] [address]  Phone: [phone]  Fax: [phone]  Follow Up Time:

## 2021-09-14 PROCEDURE — 99232 SBSQ HOSP IP/OBS MODERATE 35: CPT

## 2021-09-14 RX ORDER — INSULIN LISPRO 100/ML
VIAL (ML) SUBCUTANEOUS
Refills: 0 | Status: DISCONTINUED | OUTPATIENT
Start: 2021-09-14 | End: 2021-09-16

## 2021-09-14 RX ORDER — INSULIN GLARGINE 100 [IU]/ML
10 INJECTION, SOLUTION SUBCUTANEOUS
Refills: 0 | Status: DISCONTINUED | OUTPATIENT
Start: 2021-09-14 | End: 2021-09-16

## 2021-09-14 RX ADMIN — INSULIN GLARGINE 10 UNIT(S): 100 INJECTION, SOLUTION SUBCUTANEOUS at 21:34

## 2021-09-14 RX ADMIN — Medication 81 MILLIGRAM(S): at 12:31

## 2021-09-14 RX ADMIN — ENOXAPARIN SODIUM 40 MILLIGRAM(S): 100 INJECTION SUBCUTANEOUS at 09:40

## 2021-09-14 RX ADMIN — Medication 1: at 12:31

## 2021-09-14 RX ADMIN — INSULIN GLARGINE 20 UNIT(S): 100 INJECTION, SOLUTION SUBCUTANEOUS at 09:40

## 2021-09-14 RX ADMIN — Medication 3 MILLIGRAM(S): at 21:34

## 2021-09-14 RX ADMIN — ATORVASTATIN CALCIUM 20 MILLIGRAM(S): 80 TABLET, FILM COATED ORAL at 21:34

## 2021-09-14 NOTE — CHART NOTE - NSCHARTNOTEFT_GEN_A_CORE
Pt requires Hoyerlift for transfers, with out the lift , the patient will be bedbound.
Pt unable to ambulate without assistance. O2 Sat room air 97%.
HOME O2 EVALUATION    Pulse Ox Room Air Rest:97%    Pulse Ox Room Air Ambulating:    Pulse Ox on O2          L Ambulating:    Pulse Ox Post Ambulation:   Pt.does not ambulate without PT ;moving arms and legs in bed briefly before stating she is tired-SPO2 96% entire time

## 2021-09-15 PROCEDURE — 99223 1ST HOSP IP/OBS HIGH 75: CPT

## 2021-09-15 PROCEDURE — 99497 ADVNCD CARE PLAN 30 MIN: CPT | Mod: 25

## 2021-09-15 PROCEDURE — 99498 ADVNCD CARE PLAN ADDL 30 MIN: CPT

## 2021-09-15 PROCEDURE — 99232 SBSQ HOSP IP/OBS MODERATE 35: CPT

## 2021-09-15 RX ORDER — ENOXAPARIN SODIUM 100 MG/ML
10 INJECTION SUBCUTANEOUS
Qty: 1 | Refills: 0
Start: 2021-09-15 | End: 2021-10-14

## 2021-09-15 RX ADMIN — ATORVASTATIN CALCIUM 20 MILLIGRAM(S): 80 TABLET, FILM COATED ORAL at 22:18

## 2021-09-15 RX ADMIN — Medication 10 MILLIGRAM(S): at 09:21

## 2021-09-15 RX ADMIN — Medication 2: at 11:54

## 2021-09-15 RX ADMIN — Medication 81 MILLIGRAM(S): at 11:55

## 2021-09-15 RX ADMIN — INSULIN GLARGINE 10 UNIT(S): 100 INJECTION, SOLUTION SUBCUTANEOUS at 08:12

## 2021-09-15 RX ADMIN — Medication 3 MILLIGRAM(S): at 22:24

## 2021-09-15 RX ADMIN — ENOXAPARIN SODIUM 40 MILLIGRAM(S): 100 INJECTION SUBCUTANEOUS at 09:21

## 2021-09-15 RX ADMIN — POLYETHYLENE GLYCOL 3350 17 GRAM(S): 17 POWDER, FOR SOLUTION ORAL at 09:21

## 2021-09-15 NOTE — CONSULT NOTE ADULT - ASSESSMENT
76y old Female with hx of progressive dementia, Breast ca, CVA, DM2, LBBB, recently admitted 6/25-7/2 for UTI. Now admitted on 9/5 due to ams witnessed by daughter and unsteady gait. Found here to have positive UA, COVID positive status (unvaccinated), initial hypoxia, and CTCAP showing multifocal PNA. Of note, patient's primary caregiver is her  who is currently admitted. Palliative Care consulted to assist with establishing GOC.     1) AMS/Dementia  - likely hypoactive delirium atop dementia 2/2 to acute infection  - notes also indicating progressive dementia  - FAST 7a currently, incontinent, needs assistance with ambulation  - fall and aspiration precautions  - reorient as needed    2) Acute hypoxic respiratory failure 2/2 to COVID-19  - COVID-19 positive  - imaging appreciated showing multifocal pna  - s/p Remdisivir and decadron  - breathing well on room air  - supplemental O2 as needed    3) UTI  - positive UA  - UCx showing likely contaminant    4) Debility  - PPS<40%  - needs assistance with ADLs at baseline  - care coordinator notes appreciated- arranging assistance and equipment for home  - PT- recommended YULY vs LTC, family opted for home  - evidence of malnutrition on exam    5) Prognosis  - poor  - in setting of advancing dementia FAST 7a, needs assistance with all ADLs, recent hospitalization, live-in aids, PPS<40%, evidence of malnutrition on exam, albumin 2.4, pt would likely be hospice eligible    6) GOC/Advanced Directives  - pt does not have capacity for decision making 2/2 to dementia  - HCP on file naming pt's  who is currently confused and hospitalized- thus sons serving as surrogate decision makers: Rhys [phone]  - [facility] 9/5- CPR, send to hospital  - GOC     Thank you for including us in Mrs. Qureshi's care. Will continue to follow with you.    Ramos Sheehan MD  Palliative Care Attending  76y old Female with hx of progressive dementia, Breast ca, CVA, DM2, LBBB, recently admitted 6/25-7/2 for UTI. Now admitted on 9/5 due to ams witnessed by daughter and unsteady gait. Found here to have positive UA, COVID positive status (unvaccinated), initial hypoxia, and CTCAP showing multifocal PNA. Of note, patient's primary caregiver is her  who is currently admitted. Palliative Care consulted to assist with establishing GOC.     1) AMS/Dementia  - likely hypoactive delirium atop dementia 2/2 to acute infection  - notes also indicating progressive dementia  - FAST 7a currently, incontinent, needs assistance with ambulation  - fall and aspiration precautions  - reorient as needed    2) Acute hypoxic respiratory failure 2/2 to COVID-19  - COVID-19 positive  - imaging appreciated showing multifocal pna  - s/p Remdisivir and decadron  - breathing well on room air  - supplemental O2 as needed    3) UTI  - positive UA  - UCx showing likely contaminant    4) Debility  - PPS<40%  - needs assistance with ADLs at baseline  - care coordinator notes appreciated- arranging assistance and equipment for home  - PT- recommended YULY vs LTC, family opted for home  - evidence of malnutrition on exam    5) Prognosis  - poor  - in setting of advancing dementia FAST 7a, needs assistance with all ADLs, recent hospitalization, live-in aids, PPS<40%, evidence of malnutrition on exam, albumin 2.4, pt would likely be hospice eligible    6) GOC/Advanced Directives  - pt does not have capacity for decision making 2/2 to dementia  - HCP on file naming pt's  who is currently confused and hospitalized- thus sons serving as surrogate decision makers: Rhys [phone]  - [facility] 9/5- CPR, send to hospital- reviewed  - GOC held today- no change in plan, not ready for hospice or limits, plan to dc when medically stable. Otherwise see above GOC section    *Given sx managed, GOC clear, and dispo underway, will sign off as d/w Dr. Michel*    Thank you for including us in Mrs. Qureshi's care. Will continue to follow with you.    Ramos Sheehan MD  Palliative Care Attending

## 2021-09-15 NOTE — CONSULT NOTE ADULT - SUBJECTIVE AND OBJECTIVE BOX
HPI: Pt is a 76y old Female with hx of progressive dementia, Breast ca, CVA, DM2, LBBB, admitted on 9/5 due to ams witnessed by daughter and unsteady gait. Found here to have positive UA, COVID positive status (unvaccinated), initial hypoxia, and CTCAP showing multifocal PNA. Of note, patient's primary caregiver is her  who is currently admitted. Palliative Care consulted to assist with establishing GOC.     Met Mrs. Qureshi this am. Pt sleepy with food over body, but awakens to voice. Pt denies pain or dyspneat, not wanting to be bothered with more questions though and confused. No issues as per RN, awaiting dc today.       PAIN: ( )Yes   ( x)No    DYSPNEA: ( ) Yes  (x ) No    PAST MEDICAL & SURGICAL HISTORY:  DM (diabetes mellitus)  type 2, not on meds    Breast cancer    Cerebrovascular accident (CVA)    Dementia    LBBB (left bundle branch block)    Carotid stent occlusion        SOCIAL HX: Lives with     Hx opiate tolerance ( )YES  ( x)NO    Baseline ADLs  (Prior to Admission)  ( ) Independent   (x )Dependent    FAMILY HISTORY:  unable to gather 2/2 to dementia        Review of Systems:  Unable to gather 2/2 to dementia    PHYSICAL EXAM:    Vital Signs Last 24 Hrs  T(C): 36.8 (15 Sep 2021 07:53), Max: 36.9 (14 Sep 2021 15:06)  T(F): 98.3 (15 Sep 2021 07:53), Max: 98.4 (14 Sep 2021 15:06)  HR: 75 (15 Sep 2021 07:53) (67 - 87)  BP: 112/62 (15 Sep 2021 07:53) (101/72 - 117/90)  BP(mean): --  RR: 17 (15 Sep 2021 07:53) (17 - 18)  SpO2: 95% (15 Sep 2021 07:53) (94% - 95%)  Daily     Daily     PPSV2: 20-30 %  FAST: 7a    General: Elderly female lying in bed, lethargic, but awakens to voice, mild agitation easily quelled  Mental Status: AOx 1 (self only)  HEENT: mmm, + temporal wasting  Lungs: clear  Cardiac: + s1 s2 rrr  GI: soft nt nd +bs  : incontinent  MSK/skin- moves all extremities, muscle and fat wasting throughout  Neuro: no focal deficits      LABS:  Complete Blood Count + Automated Diff (09.09.21 @ 09:02)    WBC Count: 4.86 K/uL    RBC Count: 4.39 M/uL    Hemoglobin: 13.3 g/dL    Hematocrit: 40.1 %    Mean Cell Volume: 91.3 fl    Mean Cell Hemoglobin: 30.3 pg    Mean Cell Hemoglobin Conc: 33.2 gm/dL    Red Cell Distrib Width: 12.8 %    Platelet Count - Automated: 189 K/uL    Basic Metabolic Panel (09.10.21 @ 08:08)    Sodium, Serum: 136 mmol/L    Potassium, Serum: 5.1 mmol/L    Chloride, Serum: 103 mmol/L    Carbon Dioxide, Serum: 30 mmol/L    Anion Gap, Serum: 3 mmol/L    Blood Urea Nitrogen, Serum: 16 mg/dL    Creatinine, Serum: 0.48 mg/dL    Glucose, Serum: 207 mg/dL    Calcium, Total Serum: 8.6 mg/dL        Albumin: Albumin, Serum: 2.4 g/dL (09-13 @ 08:38)      Allergies    No Known Allergies    Intolerances      MEDICATIONS  (STANDING):  aspirin  chewable 81 milliGRAM(s) Oral daily  atorvastatin 20 milliGRAM(s) Oral at bedtime  bisacodyl Suppository 10 milliGRAM(s) Rectal daily  dextrose 40% Gel 15 Gram(s) Oral once  dextrose 5%. 1000 milliLiter(s) (50 mL/Hr) IV Continuous <Continuous>  dextrose 5%. 1000 milliLiter(s) (100 mL/Hr) IV Continuous <Continuous>  dextrose 50% Injectable 25 Gram(s) IV Push once  dextrose 50% Injectable 12.5 Gram(s) IV Push once  dextrose 50% Injectable 25 Gram(s) IV Push once  enoxaparin Injectable 40 milliGRAM(s) SubCutaneous daily  glucagon  Injectable 1 milliGRAM(s) IntraMuscular once  insulin glargine Injectable (LANTUS) 10 Unit(s) SubCutaneous two times a day  insulin lispro (ADMELOG) corrective regimen sliding scale   SubCutaneous three times a day before meals  polyethylene glycol 3350 17 Gram(s) Oral daily    MEDICATIONS  (PRN):  acetaminophen   Tablet .. 650 milliGRAM(s) Oral every 6 hours PRN Temp greater or equal to 38.5C (101.3F), Mild Pain (1 - 3)  aluminum hydroxide/magnesium hydroxide/simethicone Suspension 30 milliLiter(s) Oral every 4 hours PRN Dyspepsia  melatonin 3 milliGRAM(s) Oral at bedtime PRN Insomnia  ondansetron Injectable 4 milliGRAM(s) IV Push every 8 hours PRN Nausea and/or Vomiting      RADIOLOGY/ADDITIONAL STUDIES:    EXAM:  CT ABDOMEN AND PELVIS IC                        PROCEDURE DATE:  09/05/2021      Other findings: No gross perirectal abscess identified. Limited evaluation of the perineum on CT scan. No gross abnormality is appreciated.  IMPRESSION:  1. Patchy airspace disease the lung bases with trace pleural fluid which can be seen with multifocal pneumonia including atypical viral agents in the appropriate clinical setting.  2. Limited evaluation the perineum on CT scan. No visualized CT abnormality of the buttocks or perineum. Please correlate with clinical examination.  3. prominent soft tissue anterior to the pubic symphysis. This may be chronic. Please correlate clinically.  Additional findings as above.    DAMIEN DENNIS MD; Attending Radiologist  This document has been electronically signed. Sep  6 2021  8:30AM    EXAM:  XR CHEST PORTABLE URGENT 1V                        PROCEDURE DATE:  09/05/2021      Impression: Heart size unremarkable. Right upper lobe pneumonia.    JENNIFER CORBETT MD; Attending Interventional Radiologist  This document has been electronically signed. Sep  5 2021  8:41PM

## 2021-09-15 NOTE — CONSULT NOTE ADULT - TREATMENT GUIDELINE COMMENT
no limits, c/w current measures    *Due to the patient’s health status and restrictions on visitation during the Public Health Emergency, the Advance Care Planning service was performed via phone with patient’s sons Rhys and Johnson for 40 minutes.

## 2021-09-15 NOTE — CONSULT NOTE ADULT - CONVERSATION DETAILS
son had a nurse, falling and spine fracture, fell again fractured again and ribs, 1 week in hospital, then went to rehab [facility], they did not like the care, brought him home and hired a nurse. Feeding, cleaning, and caring for them at home, then they got the virus, they both. No good eating for dad for the past 1 year, balloon procedure helped some, basically ensure, more than a year. Losing weight along that time. Mom uses a walker, dad uses cane then to walker, left knee and hip bone on bone, thus the nurse helps him go to the bathroom, but both wearing diapers. All went downhill after he fell, taking care of wife until he fell. Confused at times, often tired, sometimes more vibrant and interactive, sometimes avoided talking about stressful. Never evaluated for dementia, think COVID affected his thinking. Visited parents before they went into the hospital and father was just sleeping not eating/drinking- spoke to him in hospital, past 2 nights, unable to get a hold of him.     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 Understands the risks.     Malnutrition- at home the nurse was getting him to eat more. Discussed two options for either GI consult   good days and bad days up and down. Denied desire for feeding tube or GI consult for now because aid at home has actually been able to get pt to eat much more and would prefer to bring him home for this instead.     Reviewed two plans home palliative: Dr. Garcia only see her at home Called Mrs. Qureshi's sons to review her medical issues and their wishes for her care. They explained that the pt lives with her  and has had dementia that is progressive. Her  took care of her until he fell this year and had spinal issues that now makes him debilitated and in need of assistance as well. Since that injury they hired a RN to be at home with  and Mrs. Qureshi. The pt needs assistance with all ADLs. They understand that she will likely continue to decline, but were glad about her doing ok despite COVID currently. They were open to review of plan and options moving forward.     We discussed their understanding of the pt's acute and chronic medial issues. They were aware of COVID pneumonia and that pt is fairing well following treatment. Noted that she is breathing well without issue an seems medically stable to consider dc as well. They also understand that she has dementia. We reviewed pt's recent hospitalization with UTI and that some issues- like being at increased risk of infection- are expected to recur in the setting of advancing dementia. As a result, teams try to review concerns about this and consideration of ways to avoid suffering or repeated hospitalizations in the future. Discussed approach to planning in terms of two pathways: 1) Palliative path- acceptance of chronic progressive nature of dementia, with expectation of further complications, but still wanting hospitalizations and interventions, not ready to focus on comfort/sx only; and 2) Hospice path- acceptance of chronic, progressive nature of dementia and preferring to avoid further hospitalizations by signing up for plan to focuses on sx of expected decline. Both sons were thoughtful about this, ultimately feeling that the pt is fairing ok at home with current services in place, and not ready to focus on comfort just yet. Thus, they opted for plan 1, though expressed gratefulness for knowing that Plan 2 will remain available. They also accepted suggestion to talk more about this with their visiting clinician Dr. Garcia.     Considering above also took time to review advanced directives. We spent time discussing risks and benefits of aggressive interventions like CPR and intubation. Suggested reconsideration of current [facility] choices (CPR, send to hospital)- reviewing poor likelihood of good outcome in setting of progressive dementia and due to COVID. Again, they were understanding, but sure that if there was any chance to survive and return home, that they would want these interventions tried. They agreed to discuss this further with Dr. Garcia as well- knowing that he would have more perspective since he has taken care of the pt for some time. They know MOLST form will follow them home and it is their right to make changes if they see the pt is continuing to decline/suffering in the future. For now, no changes and no limits.     Ultimately, the family understands all issues, all risks and clincian concerns for rehospitalization and expected decline. For now, they would like to maintain wishes for no limits, and all MOLST decisions. They are arranging home services with floor SW and case management who were notified and will follow up. Likewise, when they noted not being able to speak with or see pts for a few days, shared this with RN who will connect them and also inquire about virtual visit program. Given sx managed, GOC clear/unchanged, and  dispo under way shared intent to sign off, which family agreed with. They also were glad to know that if either pt returns to the hospital and they would like support to make changes to wishes/plan that they can always have our service called. Shared with MAXIME Sy,  Yolanda, and Dr. Michel.

## 2021-09-16 VITALS
HEART RATE: 75 BPM | OXYGEN SATURATION: 95 % | RESPIRATION RATE: 18 BRPM | DIASTOLIC BLOOD PRESSURE: 58 MMHG | TEMPERATURE: 99 F | SYSTOLIC BLOOD PRESSURE: 123 MMHG

## 2021-09-16 PROCEDURE — 99239 HOSP IP/OBS DSCHRG MGMT >30: CPT

## 2021-09-16 RX ORDER — ENOXAPARIN SODIUM 100 MG/ML
10 INJECTION SUBCUTANEOUS
Qty: 1 | Refills: 0
Start: 2021-09-16 | End: 2021-10-15

## 2021-09-16 RX ORDER — SODIUM CHLORIDE 9 MG/ML
1 INJECTION INTRAMUSCULAR; INTRAVENOUS; SUBCUTANEOUS
Qty: 0 | Refills: 0 | DISCHARGE

## 2021-09-16 RX ORDER — INSULIN LISPRO 100/ML
VIAL (ML) SUBCUTANEOUS
Refills: 0 | Status: DISCONTINUED | OUTPATIENT
Start: 2021-09-16 | End: 2021-09-16

## 2021-09-16 RX ORDER — INSULIN DETEMIR 100/ML (3)
10 INSULIN PEN (ML) SUBCUTANEOUS
Qty: 1 | Refills: 0
Start: 2021-09-16 | End: 2021-10-15

## 2021-09-16 RX ADMIN — Medication 10 MILLIGRAM(S): at 09:11

## 2021-09-16 RX ADMIN — ENOXAPARIN SODIUM 40 MILLIGRAM(S): 100 INJECTION SUBCUTANEOUS at 09:11

## 2021-09-16 RX ADMIN — POLYETHYLENE GLYCOL 3350 17 GRAM(S): 17 POWDER, FOR SOLUTION ORAL at 09:12

## 2021-09-16 RX ADMIN — Medication 81 MILLIGRAM(S): at 12:30

## 2021-09-16 NOTE — PROGRESS NOTE ADULT - REASON FOR ADMISSION
Encephalopathy

## 2021-09-16 NOTE — PROGRESS NOTE ADULT - ASSESSMENT
76 year old female patient with pertinent history of Dementia presented to the ED with mental status changes that were witnessed by her daughter.  Found to have covid19 PNA  Of note, patient is not vaccinated    #Acute Metabolic Encephalopathy Superimposed on Reported Progressive Dementia and Functional Decline  -possibly due to COVID-19 infection but unable to clinically determine.   -mentation improving  -f/u blood and urine cx. Cultures negative. Completed course of rocephin    # Sepsis with Acute Respiratory Failure due to COVID-19 Pneumonia. Reported Hypoxia in the ED prior to admission.   -pt covid positive and unvaccinated on admission  -on supplemental oxygen. Doing better on RA at rest. Home O2 evaluation ordered  -give decadron and remdesivir. ID consult. Ended of treatment. O2 saturations improved on RA at rest. Discontinue decadron  -tylenol prn  -Isolation precautions as per hospital protocol    # Hypovolemic Hyponatremia. Resolved  -IVF as needed for fluid balance  -Continue to monitor Na and other electrolytes    # DM2 with Hyperglycemia  -Hyperglycemia augmented by steroid adminsitration for COVID-19 induced hypoxia.   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     Patient with generalized muscle weakness with progressive deconditioning due to progressive advanced dementia and after undergoin hospitalization for COVID-19 pneumonia ,with extensive medical complications and prolonged hospitalization-pt will need hospital bed to keep bed elevated 30 degrees to assist pain and shortness of breath. Patient will also need frequent repositioning not feasible with ordinary bed In addition , Patient  unable to safely ambulate with walker, cane/crutches and will require wheelchair to access the bathroom for toileting and dining facilities. Patient is agreeable to the wheelchair and has a 24 hour assist with the wheelchair. Patient will need elevated leg rest to prevent edema.      # DVT ppx  -Lovenox subq      Disp: patient medically cleared for discharge, completed treatment for covid 19.  patient with dementia, debility, waiting for hospital bed to be delivered.   anticipated DC today  
76 year old female patient with mental status changes          -Admit to [facility]        Acute Metabolic Encephalopathy Superimposed on Reported Progressive Dementia and Functional Decline  -Possible augmented by COVID-19 infection but unable to clincially determine.   -mentation improving  -f/u blood and urine cx  -on rocephin    # Acute Respiratory Failure due to COVID-19 Pneumonia. Reported Hypoxia in the ED prior to admission.   -pt covid positive and unvaccinated  -on supplemental oxygen  -give decadron and remdesivir  -tylenol prn  -Isolation precautions as per hospital protocol    # Hypovolemic Hyponatremia  -IVF as needed for fluid balance  -Continue to monitor Na and other electrolytes    # DM2 with Hyperglycemia  -Hyperglycemia augmented by steroid adminsitration for COVID-19 induced hypoxia.   -Non-compliance reported at home  -A1c 11.2%  -ISS  -on lantus. 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
  76 year old female patient with pertinent history of Dementia presented to the ED with mental status changes that were witnessed by her daughter.  Found to have covid19 PNA  Of note, patient is not vaccinated    #Acute Metabolic Encephalopathy Superimposed on Reported Progressive Dementia and Functional Decline  -possibly due to COVID-19 infection but unable to clinically determine.   -mentation improving  -f/u blood and urine cx. Cultures negative. Completed course of rocephin    # Sepsis with Acute Respiratory Failure due to COVID-19 Pneumonia. Reported Hypoxia in the ED prior to admission.   -pt covid positive and unvaccinated on admission  -on supplemental oxygen. Doing better on RA at rest. Home O2 evaluation ordered  -give decadron and remdesivir. ID consult. Ended of treatment. O2 saturations improved on RA at rest. Discontinue decadron  -tylenol prn  -Isolation precautions as per hospital protocol    # Hypovolemic Hyponatremia. Resolved  -IVF as needed for fluid balance  -Continue to monitor Na and other electrolytes    # DM2 with Hyperglycemia  -Hyperglycemia augmented by steroid adminsitration for COVID-19 induced hypoxia.   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     Patient with generalized muscle weakness with progressive deconditioning due to progressive advanced dementia and after undergoin hospitalization for COVID-19 pneumonia ,with extensive medical complications and prolonged hospitalization-pt will need hospital bed to keep bed elevated 30 degrees to assist pain and shortness of breath. Patient will also need frequent repositioning not feasible with ordinary bed In addition , Patient  unable to safely ambulate with walker, cane/crutches and will require wheelchair to access the bathroom for toileting and dining facilities. Patient is agreeable to the wheelchair and has a 24 hour assist with the wheelchair. Patient will need elevated leg rest to prevent edema.      # DVT ppx  -Lovenox subq      Disp: patient medically cleared for discharge, completed treatment for covid 19.  patient with dementia, debility, waiting for hospital bed to be delivered.   anticipated DC today  
  76 year old female patient with pertinent history of Dementia presented to the ED with mental status changes that were witnessed by her daughter.  Found to have covid19 PNA  Of note, patient is not vaccinated    #cute Metabolic Encephalopathy Superimposed on Reported Progressive Dementia and Functional Decline  -possibly due to COVID-19 infection but unable to clinically determine.   -mentation improving  -f/u blood and urine cx. Cultures negative. Completed course of rocephin    # Sepsis with Acute Respiratory Failure due to COVID-19 Pneumonia. Reported Hypoxia in the ED prior to admission.   -pt covid positive and unvaccinated on admission  -on supplemental oxygen. Doing better on RA at rest. Home O2 evaluation ordered  -give decadron and remdesivir. ID consult. Ended of treatment. O2 saturations improved on RA at rest. Discontinue decadron  -tylenol prn  -Isolation precautions as per hospital protocol    # Hypovolemic Hyponatremia. Resolved  -IVF as needed for fluid balance  -Continue to monitor Na and other electrolytes    # DM2 with Hyperglycemia  -Hyperglycemia augmented by steroid adminsitration for COVID-19 induced hypoxia.   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 Patient will also need frequent repositioning not feasible with ordinary bed In addition , Patient  unable to safely ambulate with walker, cane/crutches and will require wheelchair to access the bathroom for toileting and dining facilities. Patient is agreeable to the wheelchair and has a 24 hour assist with the wheelchair. Patient will need elevated leg rest to prevent edema.      # DVT ppx  -Lovenox subq      Disp: patient medically cleared for discharge, completed treatment for covid 19.  patient with dementia, debility, waiting for hospital bed to be delivered.   anticipated DC knag  
MEDICATIONS  (STANDING):  aspirin  chewable 81 milliGRAM(s) Oral daily  atorvastatin 20 milliGRAM(s) Oral at bedtime  bisacodyl Suppository 10 milliGRAM(s) Rectal daily  cefTRIAXone Injectable. 1000 milliGRAM(s) IV Push every 24 hours  dexAMETHasone  Injectable 6 milliGRAM(s) IV Push daily  dextrose 40% Gel 15 Gram(s) Oral once  dextrose 5%. 1000 milliLiter(s) (50 mL/Hr) IV Continuous <Continuous>  dextrose 5%. 1000 milliLiter(s) (100 mL/Hr) IV Continuous <Continuous>  dextrose 50% Injectable 25 Gram(s) IV Push once  dextrose 50% Injectable 12.5 Gram(s) IV Push once  dextrose 50% Injectable 25 Gram(s) IV Push once  enoxaparin Injectable 40 milliGRAM(s) SubCutaneous daily  glucagon  Injectable 1 milliGRAM(s) IntraMuscular once  insulin glargine Injectable (LANTUS) 15 Unit(s) SubCutaneous two times a day  insulin lispro (ADMELOG) corrective regimen sliding scale   SubCutaneous three times a day before meals  polyethylene glycol 3350 17 Gram(s) Oral daily  remdesivir  IVPB 100 milliGRAM(s) IV Intermittent every 24 hours  remdesivir  IVPB   IV Intermittent     MEDICATIONS  (PRN):  acetaminophen   Tablet .. 650 milliGRAM(s) Oral every 6 hours PRN Temp greater or equal to 38.5C (101.3F), Mild Pain (1 - 3)  aluminum hydroxide/magnesium hydroxide/simethicone Suspension 30 milliLiter(s) Oral every 4 hours PRN Dyspepsia  melatonin 3 milliGRAM(s) Oral at bedtime PRN Insomnia  ondansetron Injectable 4 milliGRAM(s) IV Push every 8 hours PRN Nausea and/or Vomiting      76 year old female patient with mental status changes          -Admit to [facility]        Acute Metabolic Encephalopathy Superimposed on Reported Progressive Dementia and Functional Decline  -Possible augmented by COVID-19 infection but unable to clincially determine.   -mentation improving  -f/u blood and urine cx  -on rocephin    # Sepsis with Acute Respiratory Failure due to COVID-19 Pneumonia. Reported Hypoxia in the ED prior to admission.   -pt covid positive and unvaccinated  -on supplemental oxygen  -give decadron and remdesivir. ID consult  -tylenol prn  -Isolation precautions as per hospital protocol    # Hypovolemic Hyponatremia  -IVF as needed for fluid balance  -Continue to monitor Na and other electrolytes    # DM2 with Hyperglycemia  -Hyperglycemia augmented by steroid adminsitration for COVID-19 induced hypoxia.   -Non-compliance reported at home  -A1c 11.2%  -ISS  -on lantus. 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
MEDICATIONS  (STANDING):  aspirin  chewable 81 milliGRAM(s) Oral daily  atorvastatin 20 milliGRAM(s) Oral at bedtime  bisacodyl Suppository 10 milliGRAM(s) Rectal daily  dexAMETHasone  Injectable 6 milliGRAM(s) IV Push daily  dextrose 40% Gel 15 Gram(s) Oral once  dextrose 5%. 1000 milliLiter(s) (50 mL/Hr) IV Continuous <Continuous>  dextrose 5%. 1000 milliLiter(s) (100 mL/Hr) IV Continuous <Continuous>  dextrose 50% Injectable 25 Gram(s) IV Push once  dextrose 50% Injectable 12.5 Gram(s) IV Push once  dextrose 50% Injectable 25 Gram(s) IV Push once  enoxaparin Injectable 40 milliGRAM(s) SubCutaneous daily  glucagon  Injectable 1 milliGRAM(s) IntraMuscular once  insulin glargine Injectable (LANTUS) 30 Unit(s) SubCutaneous two times a day  insulin lispro (ADMELOG) corrective regimen sliding scale   SubCutaneous three times a day before meals  polyethylene glycol 3350 17 Gram(s) Oral daily  potassium phosphate / sodium phosphate Powder (PHOS-NaK) 1 Packet(s) Oral three times a day before meals  remdesivir  IVPB 100 milliGRAM(s) IV Intermittent every 24 hours  remdesivir  IVPB   IV Intermittent     MEDICATIONS  (PRN):  acetaminophen   Tablet .. 650 milliGRAM(s) Oral every 6 hours PRN Temp greater or equal to 38.5C (101.3F), Mild Pain (1 - 3)  aluminum hydroxide/magnesium hydroxide/simethicone Suspension 30 milliLiter(s) Oral every 4 hours PRN Dyspepsia  melatonin 3 milliGRAM(s) Oral at bedtime PRN Insomnia  ondansetron Injectable 4 milliGRAM(s) IV Push every 8 hours PRN Nausea and/or Vomiting        76 year old female patient with mental status changes          -Admit to [facility]        Acute Metabolic Encephalopathy Superimposed on Reported Progressive Dementia and Functional Decline  -possibly due to COVID-19 infection but unable to clinically determine.   -mentation improving  -f/u blood and urine cx. Cultures negative. Completed course of rocephin  -on rocephin    # Sepsis with Acute Respiratory Failure due to COVID-19 Pneumonia. Reported Hypoxia in the ED prior to admission.   -pt covid positive and unvaccinated on admission  -on supplemental oxygen. Doing better on RA at rest. Home O2 evaluation ordered  -give decadron and remdesivir. ID consult. Ended of treatment. O2 saturations improved on RA at rest. Discontinue decadron  -tylenol prn  -Isolation precautions as per hospital protocol    # Hypovolemic Hyponatremia. Resolved  -IVF as needed for fluid balance  -Continue to monitor Na and other electrolytes    # DM2 with Hyperglycemia  -Hyperglycemia augmented by steroid adminsitration for COVID-19 induced hypoxia.   -Non-compliance reported at home  -A1c 11.2%  -ISS  -on lantus. 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 Patient will also need frequent repositioning not feasible with ordinary bed In addition , Patient  unable to safely ambulate with walker, cane/crutches and will require wheelchair to access the bathroom for toileting and dining facilities. Patient is agreeable to the wheelchair and has a 24 hour assist with the wheelchair. Patient will need elevated leg rest to prevent edema.        # DVT ppx  -Lovenox subq  Disposition: o2 evaluation with ambulation. PT evaluation->YULY. YULY deferred by family. If clinical picture continues to improve, tentative discharge home with 24 hour aide  in 24-48 hours. 
MEDICATIONS  (STANDING):  aspirin  chewable 81 milliGRAM(s) Oral daily  atorvastatin 20 milliGRAM(s) Oral at bedtime  bisacodyl Suppository 10 milliGRAM(s) Rectal daily  dexAMETHasone  Injectable 6 milliGRAM(s) IV Push daily  dextrose 40% Gel 15 Gram(s) Oral once  dextrose 5%. 1000 milliLiter(s) (50 mL/Hr) IV Continuous <Continuous>  dextrose 5%. 1000 milliLiter(s) (100 mL/Hr) IV Continuous <Continuous>  dextrose 50% Injectable 25 Gram(s) IV Push once  dextrose 50% Injectable 12.5 Gram(s) IV Push once  dextrose 50% Injectable 25 Gram(s) IV Push once  enoxaparin Injectable 40 milliGRAM(s) SubCutaneous daily  glucagon  Injectable 1 milliGRAM(s) IntraMuscular once  insulin glargine Injectable (LANTUS) 30 Unit(s) SubCutaneous two times a day  insulin lispro (ADMELOG) corrective regimen sliding scale   SubCutaneous three times a day before meals  polyethylene glycol 3350 17 Gram(s) Oral daily  potassium phosphate / sodium phosphate Powder (PHOS-NaK) 1 Packet(s) Oral three times a day before meals  remdesivir  IVPB 100 milliGRAM(s) IV Intermittent every 24 hours  remdesivir  IVPB   IV Intermittent     MEDICATIONS  (PRN):  acetaminophen   Tablet .. 650 milliGRAM(s) Oral every 6 hours PRN Temp greater or equal to 38.5C (101.3F), Mild Pain (1 - 3)  aluminum hydroxide/magnesium hydroxide/simethicone Suspension 30 milliLiter(s) Oral every 4 hours PRN Dyspepsia  melatonin 3 milliGRAM(s) Oral at bedtime PRN Insomnia  ondansetron Injectable 4 milliGRAM(s) IV Push every 8 hours PRN Nausea and/or Vomiting        76 year old female patient with mental status changes          -Admit to [facility]        Acute Metabolic Encephalopathy Superimposed on Reported Progressive Dementia and Functional Decline  -Possible augmented by COVID-19 infection but unable to clincially determine.   -mentation improving  -f/u blood and urine cx  -on rocephin    # Sepsis with Acute Respiratory Failure due to COVID-19 Pneumonia. Reported Hypoxia in the ED prior to admission.   -pt covid positive and unvaccinated on admission  -on supplemental oxygen  -give decadron and remdesivir. ID consult. End of treatment. Home o2 evaluation  -tylenol prn  -Isolation precautions as per hospital protocol    # Hypovolemic Hyponatremia. Resolved  -IVF as needed for fluid balance  -Continue to monitor Na and other electrolytes    # DM2 with Hyperglycemia  -Hyperglycemia augmented by steroid adminsitration for COVID-19 induced hypoxia.   -Non-compliance reported at home  -A1c 11.2%  -ISS  -on lantus. 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 PT evaluation->YULY. Son in agreement. Will discuss with CM
MEDICATIONS  (STANDING):  aspirin  chewable 81 milliGRAM(s) Oral daily  atorvastatin 20 milliGRAM(s) Oral at bedtime  bisacodyl Suppository 10 milliGRAM(s) Rectal daily  dexAMETHasone  Injectable 6 milliGRAM(s) IV Push daily  dextrose 40% Gel 15 Gram(s) Oral once  dextrose 5%. 1000 milliLiter(s) (50 mL/Hr) IV Continuous <Continuous>  dextrose 5%. 1000 milliLiter(s) (100 mL/Hr) IV Continuous <Continuous>  dextrose 50% Injectable 25 Gram(s) IV Push once  dextrose 50% Injectable 12.5 Gram(s) IV Push once  dextrose 50% Injectable 25 Gram(s) IV Push once  enoxaparin Injectable 40 milliGRAM(s) SubCutaneous daily  glucagon  Injectable 1 milliGRAM(s) IntraMuscular once  insulin glargine Injectable (LANTUS) 30 Unit(s) SubCutaneous two times a day  insulin lispro (ADMELOG) corrective regimen sliding scale   SubCutaneous three times a day before meals  polyethylene glycol 3350 17 Gram(s) Oral daily  potassium phosphate / sodium phosphate Powder (PHOS-NaK) 1 Packet(s) Oral three times a day before meals  remdesivir  IVPB 100 milliGRAM(s) IV Intermittent every 24 hours  remdesivir  IVPB   IV Intermittent     MEDICATIONS  (PRN):  acetaminophen   Tablet .. 650 milliGRAM(s) Oral every 6 hours PRN Temp greater or equal to 38.5C (101.3F), Mild Pain (1 - 3)  aluminum hydroxide/magnesium hydroxide/simethicone Suspension 30 milliLiter(s) Oral every 4 hours PRN Dyspepsia  melatonin 3 milliGRAM(s) Oral at bedtime PRN Insomnia  ondansetron Injectable 4 milliGRAM(s) IV Push every 8 hours PRN Nausea and/or Vomiting        76 year old female patient with mental status changes          -Admit to [facility]        Acute Metabolic Encephalopathy Superimposed on Reported Progressive Dementia and Functional Decline  -Possible augmented by COVID-19 infection but unable to clincially determine.   -mentation improving  -f/u blood and urine cx  -on rocephin    # Sepsis with Acute Respiratory Failure due to COVID-19 Pneumonia. Reported Hypoxia in the ED prior to admission.   -pt covid positive and unvaccinated  -on supplemental oxygen  -give decadron and remdesivir. ID consult  -tylenol prn  -Isolation precautions as per hospital protocol    # Hypovolemic Hyponatremia. Resolved  -IVF as needed for fluid balance  -Continue to monitor Na and other electrolytes    # DM2 with Hyperglycemia  -Hyperglycemia augmented by steroid adminsitration for COVID-19 induced hypoxia.   -Non-compliance reported at home  -A1c 11.2%  -ISS  -on lantus. 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

## 2021-09-16 NOTE — PROGRESS NOTE ADULT - SUBJECTIVE AND OBJECTIVE BOX
Cheif complaints and Diagnosis: covid 19/ dementia/ debility    Subjective: no complaints      REVIEW OF SYSTEMS:    CONSTITUTIONAL: No weakness, fevers or chills  EYES/ENT: No visual changes;  No vertigo or throat pain   NECK: No pain or stiffness  RESPIRATORY: No cough, wheezing, hemoptysis; No shortness of breath  CARDIOVASCULAR: No chest pain or palpitations  GASTROINTESTINAL: No abdominal or epigastric pain. No nausea, vomiting, or hematemesis; No diarrhea or constipation. No melena or hematochezia.  GENITOURINARY: No dysuria, frequency or hematuria  NEUROLOGICAL: No numbness or weakness  SKIN: No itching, burning, rashes, or lesions   All other review of systems is negative unless indicated above      Vital Signs Last 24 Hrs  T(C): 36.4 (12 Sep 2021 08:31), Max: 36.7 (11 Sep 2021 15:45)  T(F): 97.6 (12 Sep 2021 08:31), Max: 98.1 (11 Sep 2021 15:45)  HR: 54 (12 Sep 2021 08:31) (54 - 60)  BP: 105/62 (12 Sep 2021 08:31) (100/56 - 122/80)  BP(mean): --  RR: 18 (12 Sep 2021 08:31) (16 - 18)  SpO2: 94% (12 Sep 2021 08:31) (94% - 97%)    HEENT:   pupils equal and reactive, EOMI, no oropharyngeal lesions, erythema, exudates, oral thrush    NECK:   supple, no carotid bruits, no palpable lymph nodes, no thyromegaly    CV:  +S1, +S2, regular, no murmurs or rubs    RESP:   lungs clear to auscultation bilaterally, no wheezing, rales, rhonchi, good air entry bilaterally    BREAST:  not examined    GI:  abdomen soft, non-tender, non-distended, normal BS, no bruits, no abdominal masses, no palpable masses    RECTAL:  not examined    :  not examined    MSK:   normal muscle tone, no atrophy, no rigidity, no contractions    EXT:   no clubbing, no cyanosis, no edema, no calf pain, swelling or erythema    VASCULAR:  pulses equal and symmetric in the upper and lower extremities    NEURO:  AAOX3, no focal neurological deficits, follows all commands, able to move extremities spontaneously    SKIN:  no ulcers, lesions or rashes    MEDICATIONS  (STANDING):  aspirin  chewable 81 milliGRAM(s) Oral daily  atorvastatin 20 milliGRAM(s) Oral at bedtime  bisacodyl Suppository 10 milliGRAM(s) Rectal daily  dextrose 40% Gel 15 Gram(s) Oral once  dextrose 5%. 1000 milliLiter(s) (50 mL/Hr) IV Continuous <Continuous>  dextrose 5%. 1000 milliLiter(s) (100 mL/Hr) IV Continuous <Continuous>  dextrose 50% Injectable 25 Gram(s) IV Push once  dextrose 50% Injectable 12.5 Gram(s) IV Push once  dextrose 50% Injectable 25 Gram(s) IV Push once  enoxaparin Injectable 40 milliGRAM(s) SubCutaneous daily  glucagon  Injectable 1 milliGRAM(s) IntraMuscular once  insulin glargine Injectable (LANTUS) 20 Unit(s) SubCutaneous two times a day  insulin lispro (ADMELOG) corrective regimen sliding scale   SubCutaneous three times a day before meals  polyethylene glycol 3350 17 Gram(s) Oral daily    MEDICATIONS  (PRN):  acetaminophen   Tablet .. 650 milliGRAM(s) Oral every 6 hours PRN Temp greater or equal to 38.5C (101.3F), Mild Pain (1 - 3)  aluminum hydroxide/magnesium hydroxide/simethicone Suspension 30 milliLiter(s) Oral every 4 hours PRN Dyspepsia  melatonin 3 milliGRAM(s) Oral at bedtime PRN Insomnia  ondansetron Injectable 4 milliGRAM(s) IV Push every 8 hours PRN Nausea and/or Vomiting      12 Sep 2021 09:09    x      |  x      |  x      ----------------------------<  x      x       |  x      |  0.60       TPro  6.2    /  Alb  2.3    /  TBili  0.7    /  DBili  0.2    /  AST  24     /  ALT  23     /  AlkPhos  77     12 Sep 2021 09:09  LIVER FUNCTIONS - ( 12 Sep 2021 09:09 )  Alb: 2.3 g/dL / Pro: 6.2 gm/dL / ALK PHOS: 77 U/L / ALT: 23 U/L / AST: 24 U/L / GGT: x         PT/INR - ( 12 Sep 2021 09:09 )   PT: 13.6 sec;   INR: 1.17 ratio             PT/INR - ( 12 Sep 2021 09:09 )   PT: 13.6 sec;   INR: 1.17 ratio           Assessment and Plan:      76 year old female patient with pertinent history of Dementia presented to the ED with mental status changes that were witnessed by her daughter.  Found to have covid19 PNA  Of note, patient is not vaccinated      Acute Metabolic Encephalopathy Superimposed on Reported Progressive Dementia and Functional Decline  -possibly due to COVID-19 infection but unable to clinically determine.   -mentation improving  -f/u blood and urine cx. Cultures negative. Completed course of rocephin  -on rocephin    # Sepsis with Acute Respiratory Failure due to COVID-19 Pneumonia. Reported Hypoxia in the ED prior to admission.   -pt covid positive and unvaccinated on admission  -on supplemental oxygen. Doing better on RA at rest. Home O2 evaluation ordered  -give decadron and remdesivir. ID consult. Ended of treatment. O2 saturations improved on RA at rest. Discontinue decadron  -tylenol prn  -Isolation precautions as per hospital protocol    # Hypovolemic Hyponatremia. Resolved  -IVF as needed for fluid balance  -Continue to monitor Na and other electrolytes    # DM2 with Hyperglycemia  -Hyperglycemia augmented by steroid adminsitration for COVID-19 induced hypoxia.   -Non-compliance reported at home  -A1c 11.2%  -ISS  -on lantus. 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 Patient will also need frequent repositioning not feasible with ordinary bed In addition , Patient  unable to safely ambulate with walker, cane/crutches and will require wheelchair to access the bathroom for toileting and dining facilities. Patient is agreeable to the wheelchair and has a 24 hour assist with the wheelchair. Patient will need elevated leg rest to prevent edema.      # DVT ppx  -Lovenox subq      Disp: patient medically cleared for discharge, completed treatment for covid 19.  no home o2 needed.   patient with dementia, debility, waiting for hospital bed to be delivered.   anticipated DC monday.     
CC: Reports no complaints (Unaware of circumstances, age or time); Limited information  Subjective: Reports no complaints. Denies fever, chills, chest pain, SOB, abdominal pain/discomfort, nausea, vomiting  ROS: Limited in the setting of Dementia/AMS    FROM H&P:    "76 year old female patient with pertinent history of Dementia presented to the ED with mental status changes that were witnessed by her daughter. Per collateral information patient with fever and mental status changes, also noted to be unsteady on her feet. Patient's primary caregiver is her  who is currently admitted. At the time of evaluation patient is alert and conversant- able to specify that her location and who she is. endorses occasional confusion. Of note, patient is not vaccinated        In the ED patient with UA consistent with a UTI. Patient positive for Covid and initially hypoxic on ED presentation."    PHYSICAL EXAM:    T(C): 36.9 (09-08-21 @ 15:56), Max: 36.9 (09-08-21 @ 15:56)  HR: 66 (09-08-21 @ 18:05) (66 - 78)  BP: 102/44 (09-08-21 @ 18:05) (88/60 - 124/66)  RR: 18 (09-08-21 @ 15:56) (18 - 18)  SpO2: 97% (09-08-21 @ 15:56) (96% - 97%)    General: AAOx1 (Place only); NAD  Head: AT/NC  ENT: Moist Mucous Membranes; No Injury  Neck: Non-tender; No JVD  CVS: RRR, S1&S2, Systolic Murmur, Trace edema  Respiratory: Decreased breath sound bilaterally; Normal Respiratory Effort; Respiratory support with NC  Abdomen/GI: Soft, non-tender, non-distended, no guarding, no rebound, normal bowel sounds  : No bladder distention, No Jain  Extremites: No cyanosis, No clubbing, Trace LE Edema  MSK: No CVA tenderness, Normal ROM, No injury  Neuro: AAOx1, CNII-XII grossly intact, non-focal  Psych: Flat Affect; Unable to evaluate  Skin: Clean, Dry and Intact                          12.9   4.31  )-----------( 165      ( 08 Sep 2021 11:38 )             39.2     09-08    137  |  104  |  21  ----------------------------<  306<H>  4.5   |  29  |  0.66    Ca    8.6      08 Sep 2021 11:38  Phos  2.1     09-08  Mg     2.1     09-08    TPro  5.7<L>  /  Alb  2.1<L>  /  TBili  0.4  /  DBili  0.2  /  AST  21  /  ALT  19  /  AlkPhos  58  09-08    SARS-CoV-2: Detected (05 Sep 2021 13:34)  COVID-19 PCR: NotDetec (01 Jul 2021 12:00)  COVID-19 PCR: NotDetec (25 Jun 2021 04:00)    CAPILLARY BLOOD GLUCOSE      POCT Blood Glucose.: 352 mg/dL (08 Sep 2021 21:53)  POCT Blood Glucose.: 394 mg/dL (08 Sep 2021 17:42)  POCT Blood Glucose.: 300 mg/dL (08 Sep 2021 11:49)  POCT Blood Glucose.: 226 mg/dL (08 Sep 2021 08:08)                                       13.5   5.77  )-----------( 167      ( 07 Sep 2021 10:42 )             41.7     09-07    135  |  103  |  21  ----------------------------<  376<H>  4.3   |  28  |  0.80    Ca    8.2<L>      07 Sep 2021 10:42  Phos  2.3     09-07  Mg     2.0     09-07    TPro  6.2  /  Alb  2.2<L>  /  TBili  0.5  /  DBili  0.2  /  AST  19  /  ALT  17  /  AlkPhos  64  09-07    SARS-CoV-2: Detected (05 Sep 2021 13:34)  COVID-19 PCR: NotDetec (01 Jul 2021 12:00)  COVID-19 PCR: NotDetec (25 Jun 2021 04:00)    CAPILLARY BLOOD GLUCOSE      POCT Blood Glucose.: 341 mg/dL (07 Sep 2021 12:59)  POCT Blood Glucose.: 302 mg/dL (07 Sep 2021 09:01)  POCT Blood Glucose.: 285 mg/dL (06 Sep 2021 22:26)      Culture - Urine (collected 05 Sep 2021 13:34)  Source: Clean Catch Clean Catch (Midstream)  Final Report (06 Sep 2021 15:22):    >=3 organisms. Probable collection contamination.    Culture - Blood (collected 05 Sep 2021 13:34)  Source: .Blood Blood-Peripheral  Preliminary Report (06 Sep 2021 18:02):    No growth to date.    Culture - Blood (collected 05 Sep 2021 13:34)  Source: .Blood Blood-Peripheral  Preliminary Report (06 Sep 2021 18:02):    No growth to date.                   14.5   3.43  )-----------( 143      ( 06 Sep 2021 07:54 )             43.3     09-06    137  |  105  |  14  ----------------------------<  253<H>  4.2   |  27  |  0.69    Ca    8.9      06 Sep 2021 07:54    TPro  6.5  /  Alb  2.5<L>  /  TBili  0.4  /  DBili  0.1  /  AST  20  /  ALT  20  /  AlkPhos  70  09-06    SARS-CoV-2: Detected (05 Sep 2021 13:34)  COVID-19 PCR: NotDetec (01 Jul 2021 12:00)  COVID-19 PCR: NotDetec (25 Jun 2021 04:00)    CAPILLARY BLOOD GLUCOSE      POCT Blood Glucose.: 244 mg/dL (06 Sep 2021 17:04)  POCT Blood Glucose.: 239 mg/dL (06 Sep 2021 11:59)  POCT Blood Glucose.: 265 mg/dL (06 Sep 2021 08:11)  POCT Blood Glucose.: 263 mg/dL (05 Sep 2021 23:49)      Culture - Urine (collected 05 Sep 2021 13:34)  Source: Clean Catch Clean Catch (Midstream)  Final Report (06 Sep 2021 15:22):    >=3 organisms. Probable collection contamination.    Culture - Blood (collected 05 Sep 2021 13:34)  Source: .Blood Blood-Peripheral  Preliminary Report (06 Sep 2021 18:02):    No growth to date.    Culture - Blood (collected 05 Sep 2021 13:34)  Source: .Blood Blood-Peripheral  Preliminary Report (06 Sep 2021 18:02):    No growth to date.    D-Dimer Assay, Quantitative (09.06.21 @ 07:54)   D-Dimer Assay, Quantitative: 252 ng/mL DDU A1C with Estimated Average Glucose (09.06.21 @ 07:54)   A1C with Estimated Average Glucose Result: 11.5:Lactate, Blood: 1.3 mmol/L< from: CT Abdomen and Pelvis w/ IV Cont (09.05.21 @ 19:13) >  Other findings: No gross perirectal abscess identified. Limited evaluation of the perineum on CT scan. No gross abnormality is appreciated.  IMPRESSION:  1. Patchy airspace disease the lung bases with trace pleural fluid which can be seen with multifocal pneumonia including atypical viral agents in the appropriate clinical setting.  2. Limited evaluation the perineum on CT scan. No visualized CT abnormality of the buttocks or perineum. Please correlate with clinical examination.  3. prominent soft tissue anterior tothe pubic symphysis. This may be chronic. Please correlate clinically.  Additional findings as above.    < end of copied text >  < from: Xray Chest 1 View- PORTABLE-Urgent (09.05.21 @ 15:44) >  EXAM:  XR CHEST PORTABLE URGENT 1V                            PROCEDURE DATE:  09/05/2021          INTERPRETATION:  Clinical Information: Sepsis    Technique: AP chest x-ray(s).    Comparison: 06/25/2021    Findings/  Impression: Heart size unremarkable. Right upper lobe pneumonia.    < end of copied text >  I reviewed labs, imaging, orders and vitals. 
CC: Reports no complaints (Unaware of circumstances, age or time); Limited information  Subjective: Reports no complaints. Denies fever, chills, chest pain, SOB, abdominal pain/discomfort, nausea, vomiting  ROS: Limited in the setting of Dementia/AMS    FROM H&P:    "76 year old female patient with pertinent history of Dementia presented to the ED with mental status changes that were witnessed by her daughter. Per collateral information patient with fever and mental status changes, also noted to be unsteady on her feet. Patient's primary caregiver is her  who is currently admitted. At the time of evaluation patient is alert and conversant- able to specify that her location and who she is. endorses occasional confusion. Of note, patient is not vaccinated        In the ED patient with UA consistent with a UTI. Patient positive for Covid and initially hypoxic on ED presentation."    PHYSICAL EXAM:    T(C): 37.2 (09-07-21 @ 15:18), Max: 37.2 (09-07-21 @ 15:18)  HR: 84 (09-07-21 @ 15:18) (84 - 91)  BP: 110/61 (09-07-21 @ 15:18) (110/61 - 128/68)  RR: 18 (09-07-21 @ 15:18) (18 - 19)  SpO2: 96% (09-07-21 @ 15:18) (91% - 96%)    General: AAOx1 (Place only); NAD  Head: AT/NC  ENT: Moist Mucous Membranes; No Injury  Neck: Non-tender; No JVD  CVS: RRR, S1&S2, Systolic Murmur, Trace edema  Respiratory: Decreased breath sound bilaterally; Normal Respiratory Effort; Respiratory support with NC  Abdomen/GI: Soft, non-tender, non-distended, no guarding, no rebound, normal bowel sounds  : No bladder distention, No Jain  Extremites: No cyanosis, No clubbing, Trace LE Edema  MSK: No CVA tenderness, Normal ROM, No injury  Neuro: AAOx1, CNII-XII grossly intact, non-focal  Psych: Flat Affect; Unable to evaluate  Skin: Clean, Dry and Intact                                       13.5   5.77  )-----------( 167      ( 07 Sep 2021 10:42 )             41.7     09-07    135  |  103  |  21  ----------------------------<  376<H>  4.3   |  28  |  0.80    Ca    8.2<L>      07 Sep 2021 10:42  Phos  2.3     09-07  Mg     2.0     09-07    TPro  6.2  /  Alb  2.2<L>  /  TBili  0.5  /  DBili  0.2  /  AST  19  /  ALT  17  /  AlkPhos  64  09-07    SARS-CoV-2: Detected (05 Sep 2021 13:34)  COVID-19 PCR: NotDetec (01 Jul 2021 12:00)  COVID-19 PCR: NotDetec (25 Jun 2021 04:00)    CAPILLARY BLOOD GLUCOSE      POCT Blood Glucose.: 341 mg/dL (07 Sep 2021 12:59)  POCT Blood Glucose.: 302 mg/dL (07 Sep 2021 09:01)  POCT Blood Glucose.: 285 mg/dL (06 Sep 2021 22:26)      Culture - Urine (collected 05 Sep 2021 13:34)  Source: Clean Catch Clean Catch (Midstream)  Final Report (06 Sep 2021 15:22):    >=3 organisms. Probable collection contamination.    Culture - Blood (collected 05 Sep 2021 13:34)  Source: .Blood Blood-Peripheral  Preliminary Report (06 Sep 2021 18:02):    No growth to date.    Culture - Blood (collected 05 Sep 2021 13:34)  Source: .Blood Blood-Peripheral  Preliminary Report (06 Sep 2021 18:02):    No growth to date.                   14.5   3.43  )-----------( 143      ( 06 Sep 2021 07:54 )             43.3     09-06    137  |  105  |  14  ----------------------------<  253<H>  4.2   |  27  |  0.69    Ca    8.9      06 Sep 2021 07:54    TPro  6.5  /  Alb  2.5<L>  /  TBili  0.4  /  DBili  0.1  /  AST  20  /  ALT  20  /  AlkPhos  70  09-06    SARS-CoV-2: Detected (05 Sep 2021 13:34)  COVID-19 PCR: NotDetec (01 Jul 2021 12:00)  COVID-19 PCR: NotDetec (25 Jun 2021 04:00)    CAPILLARY BLOOD GLUCOSE      POCT Blood Glucose.: 244 mg/dL (06 Sep 2021 17:04)  POCT Blood Glucose.: 239 mg/dL (06 Sep 2021 11:59)  POCT Blood Glucose.: 265 mg/dL (06 Sep 2021 08:11)  POCT Blood Glucose.: 263 mg/dL (05 Sep 2021 23:49)      Culture - Urine (collected 05 Sep 2021 13:34)  Source: Clean Catch Clean Catch (Midstream)  Final Report (06 Sep 2021 15:22):    >=3 organisms. Probable collection contamination.    Culture - Blood (collected 05 Sep 2021 13:34)  Source: .Blood Blood-Peripheral  Preliminary Report (06 Sep 2021 18:02):    No growth to date.    Culture - Blood (collected 05 Sep 2021 13:34)  Source: .Blood Blood-Peripheral  Preliminary Report (06 Sep 2021 18:02):    No growth to date.    D-Dimer Assay, Quantitative (09.06.21 @ 07:54)   D-Dimer Assay, Quantitative: 252 ng/mL DDU A1C with Estimated Average Glucose (09.06.21 @ 07:54)   A1C with Estimated Average Glucose Result: 11.5:Lactate, Blood: 1.3 mmol/L< from: CT Abdomen and Pelvis w/ IV Cont (09.05.21 @ 19:13) >  Other findings: No gross perirectal abscess identified. Limited evaluation of the perineum on CT scan. No gross abnormality is appreciated.  IMPRESSION:  1. Patchy airspace disease the lung bases with trace pleural fluid which can be seen with multifocal pneumonia including atypical viral agents in the appropriate clinical setting.  2. Limited evaluation the perineum on CT scan. No visualized CT abnormality of the buttocks or perineum. Please correlate with clinical examination.  3. prominent soft tissue anterior tothe pubic symphysis. This may be chronic. Please correlate clinically.  Additional findings as above.    < end of copied text >  < from: Xray Chest 1 View- PORTABLE-Urgent (09.05.21 @ 15:44) >  EXAM:  XR CHEST PORTABLE URGENT 1V                            PROCEDURE DATE:  09/05/2021          INTERPRETATION:  Clinical Information: Sepsis    Technique: AP chest x-ray(s).    Comparison: 06/25/2021    Findings/  Impression: Heart size unremarkable. Right upper lobe pneumonia.    < end of copied text >  I reviewed labs, imaging, orders and vitals. 
CC: Reports no complaints (Unaware of circumstances, age or time); Limited information  Subjective: Reports no complaints. Denies fever, chills, chest pain, SOB, abdominal pain/discomfort, nausea, vomiting  ROS: Limited in the setting of Dementia/AMS    FROM H&P:    "76 year old female patient with pertinent history of Dementia presented to the ED with mental status changes that were witnessed by her daughter. Per collateral information patient with fever and mental status changes, also noted to be unsteady on her feet. Patient's primary caregiver is her  who is currently admitted. At the time of evaluation patient is alert and conversant- able to specify that her location and who she is. endorses occasional confusion. Of note, patient is not vaccinated        In the ED patient with UA consistent with a UTI. Patient positive for Covid and initially hypoxic on ED presentation."    PHYSICAL EXAM:    T(C): 36.4 (09-10-21 @ 08:20), Max: 36.4 (09-09-21 @ 23:23)  HR: 54 (09-10-21 @ 08:20) (50 - 58)  BP: 149/68 (09-10-21 @ 08:20) (99/79 - 149/68)  RR: 18 (09-10-21 @ 12:25) (18 - 18)  SpO2: 99% (09-10-21 @ 12:25) (94% - 99%)  General: AAOx1 (Place only); NAD  Head: AT/NC  ENT: Moist Mucous Membranes; No Injury  Neck: Non-tender; No JVD  CVS: RRR, S1&S2, Systolic Murmur, Trace edema  Respiratory: Decreased breath sound bilaterally; Normal Respiratory Effort; Respiratory support with NC  Abdomen/GI: Soft, non-tender, non-distended, no guarding, no rebound, normal bowel sounds  : No bladder distention, No Jain  Extremites: No cyanosis, No clubbing, Trace LE Edema  MSK: No CVA tenderness, Normal ROM, No injury  Neuro: AAOx1, CNII-XII grossly intact, non-focal  Psych: Flat Affect; Unable to evaluate  Skin: Clean, Dry and Intact                          13.3   4.86  )-----------( 189      ( 09 Sep 2021 09:02 )             40.1     09-10    136  |  103  |  16  ----------------------------<  207<H>  5.1   |  30  |  0.48<L>    Ca    8.6      10 Sep 2021 08:08  Phos  2.7     09-09  Mg     2.0     09-09    TPro  x   /  Alb  x   /  TBili  x   /  DBili  0.2  /  AST  x   /  ALT  x   /  AlkPhos  x   09-10    SARS-CoV-2: Detected (05 Sep 2021 13:34)  COVID-19 PCR: NotDetec (01 Jul 2021 12:00)  COVID-19 PCR: NotDetec (25 Jun 2021 04:00)    CAPILLARY BLOOD GLUCOSE      POCT Blood Glucose.: 366 mg/dL (10 Sep 2021 11:31)  POCT Blood Glucose.: 195 mg/dL (10 Sep 2021 08:15)  POCT Blood Glucose.: 238 mg/dL (09 Sep 2021 22:55)  POCT Blood Glucose.: 336 mg/dL (09 Sep 2021 17:03)                            13.3   4.86  )-----------( 189      ( 09 Sep 2021 09:02 )             40.1     09-09    136  |  103  |  18  ----------------------------<  277<H>  4.2   |  27  |  0.59    Ca    8.1<L>      09 Sep 2021 09:02  Phos  2.7     09-09  Mg     2.0     09-09    TPro  5.8<L>  /  Alb  2.2<L>  /  TBili  0.4  /  DBili  0.1  /  AST  26  /  ALT  27  /  AlkPhos  66  09-09    SARS-CoV-2: Detected (05 Sep 2021 13:34)  COVID-19 PCR: NotDetec (01 Jul 2021 12:00)  COVID-19 PCR: NotDetec (25 Jun 2021 04:00)    CAPILLARY BLOOD GLUCOSE      POCT Blood Glucose.: 336 mg/dL (09 Sep 2021 17:03)  POCT Blood Glucose.: 287 mg/dL (09 Sep 2021 12:09)  POCT Blood Glucose.: 235 mg/dL (09 Sep 2021 08:12)  POCT Blood Glucose.: 352 mg/dL (08 Sep 2021 21:53)  POCT Blood Glucose.: 394 mg/dL (08 Sep 2021 17:42)                            12.9   4.31  )-----------( 165      ( 08 Sep 2021 11:38 )             39.2     09-08    137  |  104  |  21  ----------------------------<  306<H>  4.5   |  29  |  0.66    Ca    8.6      08 Sep 2021 11:38  Phos  2.1     09-08  Mg     2.1     09-08    TPro  5.7<L>  /  Alb  2.1<L>  /  TBili  0.4  /  DBili  0.2  /  AST  21  /  ALT  19  /  AlkPhos  58  09-08    SARS-CoV-2: Detected (05 Sep 2021 13:34)  COVID-19 PCR: NotDetec (01 Jul 2021 12:00)  COVID-19 PCR: NotDetec (25 Jun 2021 04:00)    CAPILLARY BLOOD GLUCOSE      POCT Blood Glucose.: 352 mg/dL (08 Sep 2021 21:53)  POCT Blood Glucose.: 394 mg/dL (08 Sep 2021 17:42)  POCT Blood Glucose.: 300 mg/dL (08 Sep 2021 11:49)  POCT Blood Glucose.: 226 mg/dL (08 Sep 2021 08:08)                                       13.5   5.77  )-----------( 167      ( 07 Sep 2021 10:42 )             41.7     09-07    135  |  103  |  21  ----------------------------<  376<H>  4.3   |  28  |  0.80    Ca    8.2<L>      07 Sep 2021 10:42  Phos  2.3     09-07  Mg     2.0     09-07    TPro  6.2  /  Alb  2.2<L>  /  TBili  0.5  /  DBili  0.2  /  AST  19  /  ALT  17  /  AlkPhos  64  09-07    SARS-CoV-2: Detected (05 Sep 2021 13:34)  COVID-19 PCR: NotDetec (01 Jul 2021 12:00)  COVID-19 PCR: NotDetec (25 Jun 2021 04:00)    CAPILLARY BLOOD GLUCOSE      POCT Blood Glucose.: 341 mg/dL (07 Sep 2021 12:59)  POCT Blood Glucose.: 302 mg/dL (07 Sep 2021 09:01)  POCT Blood Glucose.: 285 mg/dL (06 Sep 2021 22:26)      Culture - Urine (collected 05 Sep 2021 13:34)  Source: Clean Catch Clean Catch (Midstream)  Final Report (06 Sep 2021 15:22):    >=3 organisms. Probable collection contamination.    Culture - Blood (collected 05 Sep 2021 13:34)  Source: .Blood Blood-Peripheral  Preliminary Report (06 Sep 2021 18:02):    No growth to date.    Culture - Blood (collected 05 Sep 2021 13:34)  Source: .Blood Blood-Peripheral  Preliminary Report (06 Sep 2021 18:02):    No growth to date.                   14.5   3.43  )-----------( 143      ( 06 Sep 2021 07:54 )             43.3     09-06    137  |  105  |  14  ----------------------------<  253<H>  4.2   |  27  |  0.69    Ca    8.9      06 Sep 2021 07:54    TPro  6.5  /  Alb  2.5<L>  /  TBili  0.4  /  DBili  0.1  /  AST  20  /  ALT  20  /  AlkPhos  70  09-06    SARS-CoV-2: Detected (05 Sep 2021 13:34)  COVID-19 PCR: NotDetec (01 Jul 2021 12:00)  COVID-19 PCR: NotDetec (25 Jun 2021 04:00)    CAPILLARY BLOOD GLUCOSE      POCT Blood Glucose.: 244 mg/dL (06 Sep 2021 17:04)  POCT Blood Glucose.: 239 mg/dL (06 Sep 2021 11:59)  POCT Blood Glucose.: 265 mg/dL (06 Sep 2021 08:11)  POCT Blood Glucose.: 263 mg/dL (05 Sep 2021 23:49)      Culture - Urine (collected 05 Sep 2021 13:34)  Source: Clean Catch Clean Catch (Midstream)  Final Report (06 Sep 2021 15:22):    >=3 organisms. Probable collection contamination.    Culture - Blood (collected 05 Sep 2021 13:34)  Source: .Blood Blood-Peripheral  Preliminary Report (06 Sep 2021 18:02):    No growth to date.    Culture - Blood (collected 05 Sep 2021 13:34)  Source: .Blood Blood-Peripheral  Preliminary Report (06 Sep 2021 18:02):    No growth to date.    D-Dimer Assay, Quantitative (09.06.21 @ 07:54)   D-Dimer Assay, Quantitative: 252 ng/mL DDU A1C with Estimated Average Glucose (09.06.21 @ 07:54)   A1C with Estimated Average Glucose Result: 11.5:Lactate, Blood: 1.3 mmol/L< from: CT Abdomen and Pelvis w/ IV Cont (09.05.21 @ 19:13) >  Other findings: No gross perirectal abscess identified. Limited evaluation of the perineum on CT scan. No gross abnormality is appreciated.  IMPRESSION:  1. Patchy airspace disease the lung bases with trace pleural fluid which can be seen with multifocal pneumonia including atypical viral agents in the appropriate clinical setting.  2. Limited evaluation the perineum on CT scan. No visualized CT abnormality of the buttocks or perineum. Please correlate with clinical examination.  3. prominent soft tissue anterior tothe pubic symphysis. This may be chronic. Please correlate clinically.  Additional findings as above.    < end of copied text >  < from: Xray Chest 1 View- PORTABLE-Urgent (09.05.21 @ 15:44) >  EXAM:  XR CHEST PORTABLE URGENT 1V                            PROCEDURE DATE:  09/05/2021          INTERPRETATION:  Clinical Information: Sepsis    Technique: AP chest x-ray(s).    Comparison: 06/25/2021    Findings/  Impression: Heart size unremarkable. Right upper lobe pneumonia.    < end of copied text >  I reviewed labs, imaging, orders and vitals. 
Cheif complaints and Diagnosis: covid 19/ respiratory failure    Subjective: no complaints      REVIEW OF SYSTEMS:    CONSTITUTIONAL: No weakness, fevers or chills  EYES/ENT: No visual changes;  No vertigo or throat pain   NECK: No pain or stiffness  RESPIRATORY: No cough, wheezing, hemoptysis; No shortness of breath  CARDIOVASCULAR: No chest pain or palpitations  GASTROINTESTINAL: No abdominal or epigastric pain. No nausea, vomiting, or hematemesis; No diarrhea or constipation. No melena or hematochezia.  GENITOURINARY: No dysuria, frequency or hematuria  NEUROLOGICAL: No numbness or weakness  SKIN: No itching, burning, rashes, or lesions   All other review of systems is negative unless indicated above      Vital Signs Last 24 Hrs  T(C): 36.6 (11 Sep 2021 08:30), Max: 36.6 (11 Sep 2021 08:30)  T(F): 97.9 (11 Sep 2021 08:30), Max: 97.9 (11 Sep 2021 08:30)  HR: 55 (11 Sep 2021 08:30) (55 - 62)  BP: 139/70 (11 Sep 2021 08:30) (136/87 - 142/87)  BP(mean): --  RR: 16 (11 Sep 2021 08:30) (16 - 17)  SpO2: 92% (11 Sep 2021 08:30) (92% - 97%)    HEENT:   pupils equal and reactive, EOMI, no oropharyngeal lesions, erythema, exudates, oral thrush    NECK:   supple, no carotid bruits, no palpable lymph nodes, no thyromegaly    CV:  +S1, +S2, regular, no murmurs or rubs    RESP:   lungs clear to auscultation bilaterally, no wheezing, rales, rhonchi, good air entry bilaterally    BREAST:  not examined    GI:  abdomen soft, non-tender, non-distended, normal BS, no bruits, no abdominal masses, no palpable masses    RECTAL:  not examined    :  not examined    MSK:   normal muscle tone, no atrophy, no rigidity, no contractions    EXT:   no clubbing, no cyanosis, no edema, no calf pain, swelling or erythema    VASCULAR:  pulses equal and symmetric in the upper and lower extremities    NEURO:  , no focal neurological deficits, follows all commands, able to move extremities spontaneously    SKIN:  no ulcers, lesions or rashes    MEDICATIONS  (STANDING):  aspirin  chewable 81 milliGRAM(s) Oral daily  atorvastatin 20 milliGRAM(s) Oral at bedtime  bisacodyl Suppository 10 milliGRAM(s) Rectal daily  dextrose 40% Gel 15 Gram(s) Oral once  dextrose 5%. 1000 milliLiter(s) (50 mL/Hr) IV Continuous <Continuous>  dextrose 5%. 1000 milliLiter(s) (100 mL/Hr) IV Continuous <Continuous>  dextrose 50% Injectable 25 Gram(s) IV Push once  dextrose 50% Injectable 12.5 Gram(s) IV Push once  dextrose 50% Injectable 25 Gram(s) IV Push once  enoxaparin Injectable 40 milliGRAM(s) SubCutaneous daily  glucagon  Injectable 1 milliGRAM(s) IntraMuscular once  insulin glargine Injectable (LANTUS) 40 Unit(s) SubCutaneous two times a day  insulin lispro (ADMELOG) corrective regimen sliding scale   SubCutaneous three times a day before meals  polyethylene glycol 3350 17 Gram(s) Oral daily    MEDICATIONS  (PRN):  acetaminophen   Tablet .. 650 milliGRAM(s) Oral every 6 hours PRN Temp greater or equal to 38.5C (101.3F), Mild Pain (1 - 3)  aluminum hydroxide/magnesium hydroxide/simethicone Suspension 30 milliLiter(s) Oral every 4 hours PRN Dyspepsia  melatonin 3 milliGRAM(s) Oral at bedtime PRN Insomnia  ondansetron Injectable 4 milliGRAM(s) IV Push every 8 hours PRN Nausea and/or Vomiting      11 Sep 2021 11:10    x      |  x      |  x      ----------------------------<  x      x       |  x      |  0.54     Ca    8.6        10 Sep 2021 08:08    TPro  5.8    /  Alb  2.2    /  TBili  0.6    /  DBili  0.2    /  AST  17     /  ALT  23     /  AlkPhos  72     11 Sep 2021 11:10  LIVER FUNCTIONS - ( 11 Sep 2021 11:10 )  Alb: 2.2 g/dL / Pro: 5.8 gm/dL / ALK PHOS: 72 U/L / ALT: 23 U/L / AST: 17 U/L / GGT: x         PT/INR - ( 11 Sep 2021 11:10 )   PT: 14.7 sec;   INR: 1.28 ratio                   PT/INR - ( 11 Sep 2021 11:10 )   PT: 14.7 sec;   INR: 1.28 ratio              Assessment and Plan:      76 year old female patient with pertinent history of Dementia presented to the ED with mental status changes that were witnessed by her daughter.  Found to have covid19 PNA  Of note, patient is not vaccinated      Acute Metabolic Encephalopathy Superimposed on Reported Progressive Dementia and Functional Decline  -possibly due to COVID-19 infection but unable to clinically determine.   -mentation improving  -f/u blood and urine cx. Cultures negative. Completed course of rocephin  -on rocephin    # Sepsis with Acute Respiratory Failure due to COVID-19 Pneumonia. Reported Hypoxia in the ED prior to admission.   -pt covid positive and unvaccinated on admission  -on supplemental oxygen. Doing better on RA at rest. Home O2 evaluation ordered  -give decadron and remdesivir. ID consult. Ended of treatment. O2 saturations improved on RA at rest. Discontinue decadron  -tylenol prn  -Isolation precautions as per hospital protocol    # Hypovolemic Hyponatremia. Resolved  -IVF as needed for fluid balance  -Continue to monitor Na and other electrolytes    # DM2 with Hyperglycemia  -Hyperglycemia augmented by steroid adminsitration for COVID-19 induced hypoxia.   -Non-compliance reported at home  -A1c 11.2%  -ISS  -on lantus. 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 Patient will also need frequent repositioning not feasible with ordinary bed In addition , Patient  unable to safely ambulate with walker, cane/crutches and will require wheelchair to access the bathroom for toileting and dining facilities. Patient is agreeable to the wheelchair and has a 24 hour assist with the wheelchair. Patient will need elevated leg rest to prevent edema.        # DVT ppx  -Lovenox subq    Disp: patient medically cleared for discharge, completed treatment for covid 19.  no home o2 needed.   patient with dementia, debility, waiting for hospital bed to be delivered.   anticipated DC monday. 
CC: Reports no complaints (Unaware of circumstances, age or time); Limited information  Subjective: Reports no complaints. Denies fever, chills, chest pain, SOB, abdominal pain/discomfort, nausea, vomiting  ROS: Limited in the setting of Dementia/AMS    FROM H&P:    "76 year old female patient with pertinent history of Dementia presented to the ED with mental status changes that were witnessed by her daughter. Per collateral information patient with fever and mental status changes, also noted to be unsteady on her feet. Patient's primary caregiver is her  who is currently admitted. At the time of evaluation patient is alert and conversant- able to specify that her location and who she is. endorses occasional confusion. Of note, patient is not vaccinated        In the ED patient with UA consistent with a UTI. Patient positive for Covid and initially hypoxic on ED presentation."    PHYSICAL EXAM:    T(C): 36.1 (09-09-21 @ 15:27), Max: 36.7 (09-09-21 @ 08:23)  HR: 58 (09-09-21 @ 15:27) (53 - 66)  BP: 99/79 (09-09-21 @ 15:27) (99/79 - 124/68)  RR: 18 (09-09-21 @ 15:27) (18 - 18)  SpO2: 95% (09-09-21 @ 15:27) (93% - 96%)  General: AAOx1 (Place only); NAD  Head: AT/NC  ENT: Moist Mucous Membranes; No Injury  Neck: Non-tender; No JVD  CVS: RRR, S1&S2, Systolic Murmur, Trace edema  Respiratory: Decreased breath sound bilaterally; Normal Respiratory Effort; Respiratory support with NC  Abdomen/GI: Soft, non-tender, non-distended, no guarding, no rebound, normal bowel sounds  : No bladder distention, No Jain  Extremites: No cyanosis, No clubbing, Trace LE Edema  MSK: No CVA tenderness, Normal ROM, No injury  Neuro: AAOx1, CNII-XII grossly intact, non-focal  Psych: Flat Affect; Unable to evaluate  Skin: Clean, Dry and Intact                          13.3   4.86  )-----------( 189      ( 09 Sep 2021 09:02 )             40.1     09-09    136  |  103  |  18  ----------------------------<  277<H>  4.2   |  27  |  0.59    Ca    8.1<L>      09 Sep 2021 09:02  Phos  2.7     09-09  Mg     2.0     09-09    TPro  5.8<L>  /  Alb  2.2<L>  /  TBili  0.4  /  DBili  0.1  /  AST  26  /  ALT  27  /  AlkPhos  66  09-09    SARS-CoV-2: Detected (05 Sep 2021 13:34)  COVID-19 PCR: NotDetec (01 Jul 2021 12:00)  COVID-19 PCR: NotDetec (25 Jun 2021 04:00)    CAPILLARY BLOOD GLUCOSE      POCT Blood Glucose.: 336 mg/dL (09 Sep 2021 17:03)  POCT Blood Glucose.: 287 mg/dL (09 Sep 2021 12:09)  POCT Blood Glucose.: 235 mg/dL (09 Sep 2021 08:12)  POCT Blood Glucose.: 352 mg/dL (08 Sep 2021 21:53)  POCT Blood Glucose.: 394 mg/dL (08 Sep 2021 17:42)                            12.9   4.31  )-----------( 165      ( 08 Sep 2021 11:38 )             39.2     09-08    137  |  104  |  21  ----------------------------<  306<H>  4.5   |  29  |  0.66    Ca    8.6      08 Sep 2021 11:38  Phos  2.1     09-08  Mg     2.1     09-08    TPro  5.7<L>  /  Alb  2.1<L>  /  TBili  0.4  /  DBili  0.2  /  AST  21  /  ALT  19  /  AlkPhos  58  09-08    SARS-CoV-2: Detected (05 Sep 2021 13:34)  COVID-19 PCR: NotDetec (01 Jul 2021 12:00)  COVID-19 PCR: NotDetec (25 Jun 2021 04:00)    CAPILLARY BLOOD GLUCOSE      POCT Blood Glucose.: 352 mg/dL (08 Sep 2021 21:53)  POCT Blood Glucose.: 394 mg/dL (08 Sep 2021 17:42)  POCT Blood Glucose.: 300 mg/dL (08 Sep 2021 11:49)  POCT Blood Glucose.: 226 mg/dL (08 Sep 2021 08:08)                                       13.5   5.77  )-----------( 167      ( 07 Sep 2021 10:42 )             41.7     09-07    135  |  103  |  21  ----------------------------<  376<H>  4.3   |  28  |  0.80    Ca    8.2<L>      07 Sep 2021 10:42  Phos  2.3     09-07  Mg     2.0     09-07    TPro  6.2  /  Alb  2.2<L>  /  TBili  0.5  /  DBili  0.2  /  AST  19  /  ALT  17  /  AlkPhos  64  09-07    SARS-CoV-2: Detected (05 Sep 2021 13:34)  COVID-19 PCR: NotDetec (01 Jul 2021 12:00)  COVID-19 PCR: NotDetec (25 Jun 2021 04:00)    CAPILLARY BLOOD GLUCOSE      POCT Blood Glucose.: 341 mg/dL (07 Sep 2021 12:59)  POCT Blood Glucose.: 302 mg/dL (07 Sep 2021 09:01)  POCT Blood Glucose.: 285 mg/dL (06 Sep 2021 22:26)      Culture - Urine (collected 05 Sep 2021 13:34)  Source: Clean Catch Clean Catch (Midstream)  Final Report (06 Sep 2021 15:22):    >=3 organisms. Probable collection contamination.    Culture - Blood (collected 05 Sep 2021 13:34)  Source: .Blood Blood-Peripheral  Preliminary Report (06 Sep 2021 18:02):    No growth to date.    Culture - Blood (collected 05 Sep 2021 13:34)  Source: .Blood Blood-Peripheral  Preliminary Report (06 Sep 2021 18:02):    No growth to date.                   14.5   3.43  )-----------( 143      ( 06 Sep 2021 07:54 )             43.3     09-06    137  |  105  |  14  ----------------------------<  253<H>  4.2   |  27  |  0.69    Ca    8.9      06 Sep 2021 07:54    TPro  6.5  /  Alb  2.5<L>  /  TBili  0.4  /  DBili  0.1  /  AST  20  /  ALT  20  /  AlkPhos  70  09-06    SARS-CoV-2: Detected (05 Sep 2021 13:34)  COVID-19 PCR: NotDetec (01 Jul 2021 12:00)  COVID-19 PCR: NotDetec (25 Jun 2021 04:00)    CAPILLARY BLOOD GLUCOSE      POCT Blood Glucose.: 244 mg/dL (06 Sep 2021 17:04)  POCT Blood Glucose.: 239 mg/dL (06 Sep 2021 11:59)  POCT Blood Glucose.: 265 mg/dL (06 Sep 2021 08:11)  POCT Blood Glucose.: 263 mg/dL (05 Sep 2021 23:49)      Culture - Urine (collected 05 Sep 2021 13:34)  Source: Clean Catch Clean Catch (Midstream)  Final Report (06 Sep 2021 15:22):    >=3 organisms. Probable collection contamination.    Culture - Blood (collected 05 Sep 2021 13:34)  Source: .Blood Blood-Peripheral  Preliminary Report (06 Sep 2021 18:02):    No growth to date.    Culture - Blood (collected 05 Sep 2021 13:34)  Source: .Blood Blood-Peripheral  Preliminary Report (06 Sep 2021 18:02):    No growth to date.    D-Dimer Assay, Quantitative (09.06.21 @ 07:54)   D-Dimer Assay, Quantitative: 252 ng/mL DDU A1C with Estimated Average Glucose (09.06.21 @ 07:54)   A1C with Estimated Average Glucose Result: 11.5:Lactate, Blood: 1.3 mmol/L< from: CT Abdomen and Pelvis w/ IV Cont (09.05.21 @ 19:13) >  Other findings: No gross perirectal abscess identified. Limited evaluation of the perineum on CT scan. No gross abnormality is appreciated.  IMPRESSION:  1. Patchy airspace disease the lung bases with trace pleural fluid which can be seen with multifocal pneumonia including atypical viral agents in the appropriate clinical setting.  2. Limited evaluation the perineum on CT scan. No visualized CT abnormality of the buttocks or perineum. Please correlate with clinical examination.  3. prominent soft tissue anterior tothe pubic symphysis. This may be chronic. Please correlate clinically.  Additional findings as above.    < end of copied text >  < from: Xray Chest 1 View- PORTABLE-Urgent (09.05.21 @ 15:44) >  EXAM:  XR CHEST PORTABLE URGENT 1V                            PROCEDURE DATE:  09/05/2021          INTERPRETATION:  Clinical Information: Sepsis    Technique: AP chest x-ray(s).    Comparison: 06/25/2021    Findings/  Impression: Heart size unremarkable. Right upper lobe pneumonia.    < end of copied text >  I reviewed labs, imaging, orders and vitals. 
CC: Reports no complaints (Unaware of circumstances, age or time); Limited information  Subjective: Reports no complaints. Denies fever, chills, chest pain, SOB, abdominal pain/discomfort, nausea, vomiting  ROS: Limited in the setting of Dementia/AMS    FROM H&P:    "76 year old female patient with pertinent history of Dementia presented to the ED with mental status changes that were witnessed by her daughter. Per collateral information patient with fever and mental status changes, also noted to be unsteady on her feet. Patient's primary caregiver is her  who is currently admitted. At the time of evaluation patient is alert and conversant- able to specify that her location and who she is. endorses occasional confusion. Of note, patient is not vaccinated        In the ED patient with UA consistent with a UTI. Patient positive for Covid and initially hypoxic on ED presentation."    PHYSICAL EXAM:    T(C): 37.1 (09-06-21 @ 15:44), Max: 37.4 (09-05-21 @ 20:43)  HR: 85 (09-06-21 @ 15:44) (85 - 96)  BP: 102/61 (09-06-21 @ 15:44) (102/61 - 129/93)  RR: 19 (09-06-21 @ 15:44) (19 - 20)  SpO2: 97% (09-06-21 @ 15:44) (95% - 99%)    General: AAOx1 (Place only); NAD  Head: AT/NC  ENT: Moist Mucous Membranes; No Injury  Neck: Non-tender; No JVD  CVS: RRR, S1&S2, Systolic Murmur, Trace edema  Respiratory: Decreased breath sound bilaterally; Normal Respiratory Effort; Respiratory support with NC  Abdomen/GI: Soft, non-tender, non-distended, no guarding, no rebound, normal bowel sounds  : No bladder distention, No Jain  Extremites: No cyanosis, No clubbing, Trace LE Edema  MSK: No CVA tenderness, Normal ROM, No injury  Neuro: AAOx1, CNII-XII grossly intact, non-focal  Psych: Flat Affect; Unable to evaluate  Skin: Clean, Dry and Intact                            14.5   3.43  )-----------( 143      ( 06 Sep 2021 07:54 )             43.3     09-06    137  |  105  |  14  ----------------------------<  253<H>  4.2   |  27  |  0.69    Ca    8.9      06 Sep 2021 07:54    TPro  6.5  /  Alb  2.5<L>  /  TBili  0.4  /  DBili  0.1  /  AST  20  /  ALT  20  /  AlkPhos  70  09-06    SARS-CoV-2: Detected (05 Sep 2021 13:34)  COVID-19 PCR: NotDetec (01 Jul 2021 12:00)  COVID-19 PCR: NotDetec (25 Jun 2021 04:00)    CAPILLARY BLOOD GLUCOSE      POCT Blood Glucose.: 244 mg/dL (06 Sep 2021 17:04)  POCT Blood Glucose.: 239 mg/dL (06 Sep 2021 11:59)  POCT Blood Glucose.: 265 mg/dL (06 Sep 2021 08:11)  POCT Blood Glucose.: 263 mg/dL (05 Sep 2021 23:49)      Culture - Urine (collected 05 Sep 2021 13:34)  Source: Clean Catch Clean Catch (Midstream)  Final Report (06 Sep 2021 15:22):    >=3 organisms. Probable collection contamination.    Culture - Blood (collected 05 Sep 2021 13:34)  Source: .Blood Blood-Peripheral  Preliminary Report (06 Sep 2021 18:02):    No growth to date.    Culture - Blood (collected 05 Sep 2021 13:34)  Source: .Blood Blood-Peripheral  Preliminary Report (06 Sep 2021 18:02):    No growth to date.    D-Dimer Assay, Quantitative (09.06.21 @ 07:54)   D-Dimer Assay, Quantitative: 252 ng/mL DDU A1C with Estimated Average Glucose (09.06.21 @ 07:54)   A1C with Estimated Average Glucose Result: 11.5:Lactate, Blood: 1.3 mmol/L< from: CT Abdomen and Pelvis w/ IV Cont (09.05.21 @ 19:13) >  Other findings: No gross perirectal abscess identified. Limited evaluation of the perineum on CT scan. No gross abnormality is appreciated.  IMPRESSION:  1. Patchy airspace disease the lung bases with trace pleural fluid which can be seen with multifocal pneumonia including atypical viral agents in the appropriate clinical setting.  2. Limited evaluation the perineum on CT scan. No visualized CT abnormality of the buttocks or perineum. Please correlate with clinical examination.  3. prominent soft tissue anterior tothe pubic symphysis. This may be chronic. Please correlate clinically.  Additional findings as above.    < end of copied text >  < from: Xray Chest 1 View- PORTABLE-Urgent (09.05.21 @ 15:44) >  EXAM:  XR CHEST PORTABLE URGENT 1V                            PROCEDURE DATE:  09/05/2021          INTERPRETATION:  Clinical Information: Sepsis    Technique: AP chest x-ray(s).    Comparison: 06/25/2021    Findings/  Impression: Heart size unremarkable. Right upper lobe pneumonia.    < end of copied text >  I reviewed labs, imaging, orders and vitals. 
HOSPITALIST PROGRESS NOTE:  SUBJECTIVE:  PCP:  Chief Complaint: Patient is a 76y old  Female who presents with a chief complaint of Encephalopathy (13 Sep 2021 13:59)      HPI:  76 year old female patient with pertinent history of Dementia presented to the ED with mental status changes that were witnessed by her daughter. Per collateral information patient with fever and mental status changes, also noted to be unsteady on her feet. Patient's primary caregiver is her  who is currently admitted. At the time of evaluation patient is alert and conversant- able to specify that her location and who she is. endorses occasional confusion. Of note, patient is not vaccinated  In the ED patient with UA consistent with a UTI. Patient positive for Covid and initially hypoxic on ED presentation.   (05 Sep 2021 20:41)    9/14:  Patient confused; has no complaints; awating for bed at home       Allergies:  No Known Allergies    REVIEW OF SYSTEMS:  See HPI. All other review of systems is negative unless indicated above.     OBJECTIVE  Physical Exam:  Vital Signs:    Vital Signs Last 24 Hrs  T(C): 36.9 (14 Sep 2021 15:06), Max: 36.9 (14 Sep 2021 15:06)  T(F): 98.4 (14 Sep 2021 15:06), Max: 98.4 (14 Sep 2021 15:06)  HR: 87 (14 Sep 2021 15:06) (71 - 87)  BP: 101/72 (14 Sep 2021 15:06) (101/72 - 135/62)  BP(mean): --  RR: 18 (14 Sep 2021 15:06) (18 - 18)  SpO2: 95% (14 Sep 2021 15:06) (95% - 97%)  I&O's Summary    13 Sep 2021 07:01  -  14 Sep 2021 07:00  --------------------------------------------------------  IN: 0 mL / OUT: 550 mL / NET: -550 mL      Constitutional: NAD, awake developed  Neurological: no focal deficits  HEENT: PERRLA, EOMI, MMM  Neck: Soft and supple, No LAD, No JVD  Respiratory: Breath sounds are clear bilaterally, No wheezing, rales or rhonchi  Cardiovascular: S1 and S2, regular rate and rhythm; no Murmurs, gallops or rubs  Gastrointestinal: Bowel Sounds present, soft, nontender, nondistended, no guarding, no rebound tenderness  Back: No CVA tenderness   Extremities: No peripheral edema  Vascular: 2+ peripheral pulses  Musculoskeletal: 5/5 strength b/l upper and lower extremities  Skin: No rashes  Breast: Deferred  Rectal: Deferred    MEDICATIONS  (STANDING):  aspirin  chewable 81 milliGRAM(s) Oral daily  atorvastatin 20 milliGRAM(s) Oral at bedtime  bisacodyl Suppository 10 milliGRAM(s) Rectal daily  dextrose 40% Gel 15 Gram(s) Oral once  dextrose 5%. 1000 milliLiter(s) (100 mL/Hr) IV Continuous <Continuous>  dextrose 5%. 1000 milliLiter(s) (50 mL/Hr) IV Continuous <Continuous>  dextrose 50% Injectable 25 Gram(s) IV Push once  dextrose 50% Injectable 12.5 Gram(s) IV Push once  dextrose 50% Injectable 25 Gram(s) IV Push once  enoxaparin Injectable 40 milliGRAM(s) SubCutaneous daily  glucagon  Injectable 1 milliGRAM(s) IntraMuscular once  insulin glargine Injectable (LANTUS) 20 Unit(s) SubCutaneous two times a day  insulin lispro (ADMELOG) corrective regimen sliding scale   SubCutaneous three times a day before meals  polyethylene glycol 3350 17 Gram(s) Oral daily      LABS: All Labs Reviewed:    09-13    x   |  x   |  x   ----------------------------<  x   x    |  x   |  0.53      TPro  6.2  /  Alb  2.4<L>  /  TBili  0.7  /  DBili  0.2  /  AST  23  /  ALT  21  /  AlkPhos  86  09-13    PT/INR - ( 13 Sep 2021 08:38 )   PT: 13.9 sec;   INR: 1.21 ratio          RADIOLOGY/EKG:    < from: Xray Chest 1 View- PORTABLE-Urgent (09.05.21 @ 15:44) >    Findings/  Impression: Heart size unremarkable. Right upper lobe pneumonia.      < end of copied text >  < from: CT Abdomen and Pelvis w/ IV Cont (09.05.21 @ 19:13) >    Other findings: No gross perirectal abscess identified. Limited evaluation of the perineum on CT scan. No gross abnormality is appreciated.  IMPRESSION:  1. Patchy airspace disease the lung bases with trace pleural fluid which can be seen with multifocal pneumonia including atypical viral agents in the appropriate clinical setting.  2. Limited evaluation the perineum on CT scan. No visualized CT abnormality of the buttocks or perineum. Please correlate with clinical examination.  3. prominent soft tissue anterior tothe pubic symphysis. This may be chronic. Please correlate clinically.  Additional findings as above.      < end of copied text >        
HOSPITALIST PROGRESS NOTE:  SUBJECTIVE:  PCP:  Chief Complaint: Patient is a 76y old  Female who presents with a chief complaint of Encephalopathy (13 Sep 2021 13:59)      HPI:  76 year old female patient with pertinent history of Dementia presented to the ED with mental status changes that were witnessed by her daughter. Per collateral information patient with fever and mental status changes, also noted to be unsteady on her feet. Patient's primary caregiver is her  who is currently admitted. At the time of evaluation patient is alert and conversant- able to specify that her location and who she is. endorses occasional confusion. Of note, patient is not vaccinated  In the ED patient with UA consistent with a UTI. Patient positive for Covid and initially hypoxic on ED presentation.   (05 Sep 2021 20:41)    9/14:  Patient confused; has no complaints; awating for bed at home   9/15: No overnight events; seen by palliative       Allergies:  No Known Allergies    REVIEW OF SYSTEMS:  See HPI. All other review of systems is negative unless indicated above.     OBJECTIVE  Physical Exam:  Vital Signs Last 24 Hrs  T(C): 36.8 (15 Sep 2021 07:53), Max: 36.9 (14 Sep 2021 15:06)  T(F): 98.3 (15 Sep 2021 07:53), Max: 98.4 (14 Sep 2021 15:06)  HR: 75 (15 Sep 2021 07:53) (67 - 87)  BP: 112/62 (15 Sep 2021 07:53) (101/72 - 117/90)  BP(mean): --  RR: 17 (15 Sep 2021 07:53) (17 - 18)  SpO2: 95% (15 Sep 2021 07:53) (94% - 95%)      Constitutional: NAD, awake developed  Neurological: confused; no focal deficits  HEENT: PERRLA, EOMI, MMM  Neck: Soft and supple, No LAD, No JVD  Respiratory: Breath sounds are clear bilaterally, No wheezing, rales or rhonchi  Cardiovascular: S1 and S2, regular rate and rhythm; no Murmurs, gallops or rubs  Gastrointestinal: Bowel Sounds present, soft, nontender, nondistended, no guarding, no rebound tenderness  Back: No CVA tenderness   Extremities: No peripheral edema  Vascular: 2+ peripheral pulses  Musculoskeletal: 5/5 strength b/l upper and lower extremities  Skin: No rashes  Breast: Deferred  Rectal: Deferred    MEDICATIONS  (STANDING):  aspirin  chewable 81 milliGRAM(s) Oral daily  atorvastatin 20 milliGRAM(s) Oral at bedtime  bisacodyl Suppository 10 milliGRAM(s) Rectal daily  dextrose 40% Gel 15 Gram(s) Oral once  dextrose 5%. 1000 milliLiter(s) (100 mL/Hr) IV Continuous <Continuous>  dextrose 5%. 1000 milliLiter(s) (50 mL/Hr) IV Continuous <Continuous>  dextrose 50% Injectable 25 Gram(s) IV Push once  dextrose 50% Injectable 12.5 Gram(s) IV Push once  dextrose 50% Injectable 25 Gram(s) IV Push once  enoxaparin Injectable 40 milliGRAM(s) SubCutaneous daily  glucagon  Injectable 1 milliGRAM(s) IntraMuscular once  insulin glargine Injectable (LANTUS) 20 Unit(s) SubCutaneous two times a day  insulin lispro (ADMELOG) corrective regimen sliding scale   SubCutaneous three times a day before meals  polyethylene glycol 3350 17 Gram(s) Oral daily      LABS: All Labs Reviewed:    09-13    x   |  x   |  x   ----------------------------<  x   x    |  x   |  0.53      TPro  6.2  /  Alb  2.4<L>  /  TBili  0.7  /  DBili  0.2  /  AST  23  /  ALT  21  /  AlkPhos  86  09-13    PT/INR - ( 13 Sep 2021 08:38 )   PT: 13.9 sec;   INR: 1.21 ratio          RADIOLOGY/EKG:    < from: Xray Chest 1 View- PORTABLE-Urgent (09.05.21 @ 15:44) >    Findings/  Impression: Heart size unremarkable. Right upper lobe pneumonia.      < end of copied text >  < from: CT Abdomen and Pelvis w/ IV Cont (09.05.21 @ 19:13) >    Other findings: No gross perirectal abscess identified. Limited evaluation of the perineum on CT scan. No gross abnormality is appreciated.  IMPRESSION:  1. Patchy airspace disease the lung bases with trace pleural fluid which can be seen with multifocal pneumonia including atypical viral agents in the appropriate clinical setting.  2. Limited evaluation the perineum on CT scan. No visualized CT abnormality of the buttocks or perineum. Please correlate with clinical examination.  3. prominent soft tissue anterior tothe pubic symphysis. This may be chronic. Please correlate clinically.  Additional findings as above.      < end of copied text >        
HOSPITALIST PROGRESS NOTE:  SUBJECTIVE:  PCP:  Chief Complaint: Patient is a 76y old  Female who presents with a chief complaint of Encephalopathy (13 Sep 2021 13:59)      HPI:  76 year old female patient with pertinent history of Dementia presented to the ED with mental status changes that were witnessed by her daughter. Per collateral information patient with fever and mental status changes, also noted to be unsteady on her feet. Patient's primary caregiver is her  who is currently admitted. At the time of evaluation patient is alert and conversant- able to specify that her location and who she is. endorses occasional confusion. Of note, patient is not vaccinated  In the ED patient with UA consistent with a UTI. Patient positive for Covid and initially hypoxic on ED presentation.   (05 Sep 2021 20:41)    9/14:  Patient confused; has no complaints; awating for bed at home   9/15: No overnight events; seen by palliative   9/16:  Patient confused; No overnight events;     Allergies:  No Known Allergies    REVIEW OF SYSTEMS:  See HPI. All other review of systems is negative unless indicated above.     OBJECTIVE  Physical Exam:  Vital Signs Last 24 Hrs  T(C): 37 (16 Sep 2021 09:14), Max: 37 (16 Sep 2021 09:14)  T(F): 98.6 (16 Sep 2021 09:14), Max: 98.6 (16 Sep 2021 09:14)  HR: 63 (16 Sep 2021 09:14) (63 - 71)  BP: 105/55 (16 Sep 2021 09:14) (105/55 - 119/67)  BP(mean): --  RR: 18 (16 Sep 2021 09:14) (18 - 18)  SpO2: 94% (16 Sep 2021 09:14) (93% - 97%)      Constitutional: NAD, awake developed  Neurological: confused; no focal deficits  HEENT: PERRLA, EOMI, MMM  Neck: Soft and supple, No LAD, No JVD  Respiratory: Breath sounds are clear bilaterally, No wheezing, rales or rhonchi  Cardiovascular: S1 and S2, regular rate and rhythm; no Murmurs, gallops or rubs  Gastrointestinal: Bowel Sounds present, soft, nontender, nondistended, no guarding, no rebound tenderness  Back: No CVA tenderness   Extremities: No peripheral edema  Vascular: 2+ peripheral pulses  Musculoskeletal: 5/5 strength b/l upper and lower extremities  Skin: No rashes  Breast: Deferred  Rectal: Deferred    MEDICATIONS  (STANDING):  aspirin  chewable 81 milliGRAM(s) Oral daily  atorvastatin 20 milliGRAM(s) Oral at bedtime  bisacodyl Suppository 10 milliGRAM(s) Rectal daily  dextrose 40% Gel 15 Gram(s) Oral once  dextrose 5%. 1000 milliLiter(s) (100 mL/Hr) IV Continuous <Continuous>  dextrose 5%. 1000 milliLiter(s) (50 mL/Hr) IV Continuous <Continuous>  dextrose 50% Injectable 25 Gram(s) IV Push once  dextrose 50% Injectable 12.5 Gram(s) IV Push once  dextrose 50% Injectable 25 Gram(s) IV Push once  enoxaparin Injectable 40 milliGRAM(s) SubCutaneous daily  glucagon  Injectable 1 milliGRAM(s) IntraMuscular once  insulin glargine Injectable (LANTUS) 20 Unit(s) SubCutaneous two times a day  insulin lispro (ADMELOG) corrective regimen sliding scale   SubCutaneous three times a day before meals  polyethylene glycol 3350 17 Gram(s) Oral daily      LABS: All Labs Reviewed:    09-13    x   |  x   |  x   ----------------------------<  x   x    |  x   |  0.53      TPro  6.2  /  Alb  2.4<L>  /  TBili  0.7  /  DBili  0.2  /  AST  23  /  ALT  21  /  AlkPhos  86  09-13    PT/INR - ( 13 Sep 2021 08:38 )   PT: 13.9 sec;   INR: 1.21 ratio          RADIOLOGY/EKG:    < from: Xray Chest 1 View- PORTABLE-Urgent (09.05.21 @ 15:44) >    Findings/  Impression: Heart size unremarkable. Right upper lobe pneumonia.      < end of copied text >  < from: CT Abdomen and Pelvis w/ IV Cont (09.05.21 @ 19:13) >    Other findings: No gross perirectal abscess identified. Limited evaluation of the perineum on CT scan. No gross abnormality is appreciated.  IMPRESSION:  1. Patchy airspace disease the lung bases with trace pleural fluid which can be seen with multifocal pneumonia including atypical viral agents in the appropriate clinical setting.  2. Limited evaluation the perineum on CT scan. No visualized CT abnormality of the buttocks or perineum. Please correlate with clinical examination.  3. prominent soft tissue anterior tothe pubic symphysis. This may be chronic. Please correlate clinically.  Additional findings as above.      < end of copied text >

## 2021-09-17 ENCOUNTER — TRANSCRIPTION ENCOUNTER (OUTPATIENT)
Age: 76
End: 2021-09-17

## 2021-09-20 NOTE — ED ADULT TRIAGE NOTE - ACCOMPANIED BY
Received request via: Pharmacy    Was the patient seen in the last year in this department? Yes    Does the patient have an active prescription (recently filled or refills available) for medication(s) requested? No  
EMT/paramedic

## 2021-09-22 ENCOUNTER — TRANSCRIPTION ENCOUNTER (OUTPATIENT)
Age: 76
End: 2021-09-22

## 2021-09-22 DIAGNOSIS — J96.01 ACUTE RESPIRATORY FAILURE WITH HYPOXIA: ICD-10-CM

## 2021-09-22 DIAGNOSIS — Z85.3 PERSONAL HISTORY OF MALIGNANT NEOPLASM OF BREAST: ICD-10-CM

## 2021-09-22 DIAGNOSIS — I44.7 LEFT BUNDLE-BRANCH BLOCK, UNSPECIFIED: ICD-10-CM

## 2021-09-22 DIAGNOSIS — J12.82 PNEUMONIA DUE TO CORONAVIRUS DISEASE 2019: ICD-10-CM

## 2021-09-22 DIAGNOSIS — Z79.4 LONG TERM (CURRENT) USE OF INSULIN: ICD-10-CM

## 2021-09-22 DIAGNOSIS — N39.0 URINARY TRACT INFECTION, SITE NOT SPECIFIED: ICD-10-CM

## 2021-09-22 DIAGNOSIS — G93.41 METABOLIC ENCEPHALOPATHY: ICD-10-CM

## 2021-09-22 DIAGNOSIS — A41.9 SEPSIS, UNSPECIFIED ORGANISM: ICD-10-CM

## 2021-09-22 DIAGNOSIS — F03.90 UNSPECIFIED DEMENTIA WITHOUT BEHAVIORAL DISTURBANCE: ICD-10-CM

## 2021-09-22 DIAGNOSIS — U07.1 COVID-19: ICD-10-CM

## 2021-09-22 DIAGNOSIS — E11.65 TYPE 2 DIABETES MELLITUS WITH HYPERGLYCEMIA: ICD-10-CM

## 2021-09-22 DIAGNOSIS — Z79.82 LONG TERM (CURRENT) USE OF ASPIRIN: ICD-10-CM

## 2021-09-22 DIAGNOSIS — E87.1 HYPO-OSMOLALITY AND HYPONATREMIA: ICD-10-CM

## 2021-09-22 DIAGNOSIS — Z91.14 PATIENT'S OTHER NONCOMPLIANCE WITH MEDICATION REGIMEN: ICD-10-CM

## 2021-09-22 DIAGNOSIS — Z86.73 PERSONAL HISTORY OF TRANSIENT ISCHEMIC ATTACK (TIA), AND CEREBRAL INFARCTION WITHOUT RESIDUAL DEFICITS: ICD-10-CM

## 2021-11-18 NOTE — ED ADULT NURSE NOTE - NSIMPLEMENTINTERV_GEN_ALL_ED
Implemented All Fall Risk Interventions:  [place] to call system. Call bell, personal items and telephone within reach. Instruct patient to call for assistance. Room bathroom lighting operational. Non-slip footwear when patient is off stretcher. Physically safe environment: no spills, clutter or unnecessary equipment. Stretcher in lowest position, wheels locked, appropriate side rails in place. Provide visual cue, wrist band, yellow gown, etc. Monitor gait and stability. Monitor for mental status changes and reorient to person, place, and time. Review medications for side effects contributing to fall risk. Reinforce activity limits and safety measures with patient and family. Quality 402: Tobacco Use And Help With Quitting Among Adolescents: Patient screened for tobacco and never smoked Quality 128: Preventive Care And Screening: Body Mass Index (Bmi) Screening And Follow-Up Plan: BMI is documented above normal parameters and a follow-up plan is documented Quality 110: Preventive Care And Screening: Influenza Immunization: Influenza Immunization Ordered or Recommended, but not Administered due to system reason Quality 431: Preventive Care And Screening: Unhealthy Alcohol Use - Screening: Patient not identified as an unhealthy alcohol user when screened for unhealthy alcohol use using a systematic screening method Detail Level: Detailed Quality 265: Biopsy Follow-Up: Biopsy results reviewed, communicated, tracked, and documented Quality 130: Documentation Of Current Medications In The Medical Record: Current Medications Documented

## 2021-12-07 NOTE — PATIENT PROFILE ADULT - NSPROPTRIGHTCAREGIVER_GEN_A_NUR
What Brings You In Today? (This Is An Xx Year Old Patient Who Presents For...): skin lesion When Did You First Notice It? (The Patient First Noticed It...): 2-3 months ago Where On Your Body Is It? (Located On...): right upper abdomen (right rib cage) Any Associated Symptoms? (The Symptoms Include.....): new, sensitive to touch What Previous Treatments Have You Tried? (The Patient Has Tried The Following Treatments...): no treatment Did Anything Happen To Make You Want To Come In For This Specifically Today? (The Specific Reason That The Patient Came In Today Was Because:): evaluation and management and a FSE.\\nHer father has had BCC and dysplastic nevi.\\n\\nShe is accompanied by her mother declines

## 2022-01-07 NOTE — PHYSICAL THERAPY INITIAL EVALUATION ADULT - IMPAIRMENTS CONTRIBUTING TO GAIT DEVIATIONS, PT EVAL
PAST MEDICAL HISTORY:  H/O hydronephrosis right kidney      decreased endurance/impaired balance/cognition/decreased ROM/decreased strength

## 2022-07-28 NOTE — ED PROVIDER NOTE - OTHER FINDINGS
----- Message from Pao Terry MA sent at 7/27/2022  7:20 PM CDT -----  Type:  Sooner Apoointment Request    Caller is requesting a sooner appointment. yes    Name of Caller: pt  When is the first available appointment? N/a  Would the patient rather a call back or a response via [facility]? Call back  Best Call Back Number: [phone]  Additional Information:  please contact pt mother to est care with provider         LBBB, unchanged from prior EKG

## 2022-08-04 NOTE — DISCHARGE NOTE NURSING/CASE MANAGEMENT/SOCIAL WORK - SOCIAL WORKER'S NAME
Bridget Ramírez [facility] Quality 226: Preventive Care And Screening: Tobacco Use: Screening And Cessation Intervention: Patient screened for tobacco use and is an ex/non-smoker Quality 110: Preventive Care And Screening: Influenza Immunization: Influenza Immunization Administered during Influenza season Detail Level: Detailed Quality 431: Preventive Care And Screening: Unhealthy Alcohol Use - Screening: Patient not identified as an unhealthy alcohol user when screened for unhealthy alcohol use using a systematic screening method

## 2022-12-22 NOTE — ED ADULT NURSE NOTE - BREATH SOUNDS, MLM
Clear Picato Pregnancy And Lactation Text: This medication is Pregnancy Category C. It is unknown if this medication is excreted in breast milk.

## 2023-01-17 NOTE — ED ADULT NURSE NOTE - NSSEPSISSUSPECTED_ED_A_ED
"[facility] GERIATRICS    Chief Complaint   Patient presents with     RECHECK     New wound LLE/[place]   Routine     HPI:  Ben Martínez is a 89 year old  (1/14/1928), who is being seen today for an episodic care visit at: [facility] [[id number]]. Today's concern is: patient continues with LLE wounds. Patient is requesting orders for wound care with [facility]. No further concerns per staff, patient needs F2F.    BP Readings from Last 3 Encounters:   01/18/23 (!) 155/69   12/14/22 126/66   12/07/22 126/66     Pulse Readings from Last 4 Encounters:   01/18/23 57   12/14/22 67   12/07/22 67   11/30/22 67     Wt Readings from Last 4 Encounters:   01/18/23 54.1 kg (119 lb 3.2 oz)   12/14/22 50.3 kg (111 lb)   12/07/22 50.3 kg (111 lb)   11/30/22 49.9 kg (110 lb)     Allergies, and PMH/PSH reviewed in [facility] today.  REVIEW OF SYSTEMS:  10 point ROS of systems including Constitutional, Eyes, Respiratory, Cardiovascular, Gastroenterology, Genitourinary, Integumentary, Musculoskeletal, Psychiatric were all negative except for pertinent positives noted in my HPI.    Objective:   BP (!) 155/69   Pulse 57   Temp 97.7  F (36.5  C)   Resp 16   Ht 1.448 m (4' 9\")   Wt 54.1 kg (119 lb 3.2 oz)   SpO2 98%   BMI 25.79 kg/m    A & O x 3, NAD. Lungs CTA, non labored. RRR, S1/S2 w/o murmur,gallop or rub.  no edema.  Abdomen soft, nontender, +BT'S. No focal neurological deficits.  Open blisters LLE.       Recent labs in [facility] reviewed by me today.     Assessment/Plan:  (S81.009D,  S81.809D,  S91.009D) Open wound of knee, leg, and ankle, unspecified laterality, subsequent encounter  (primary encounter diagnosis)  Comment: Acute/chronic LLE wounds.   Plan:   -Patient needs F2F for home health care      MED REC REQUIRED  Post Medication Reconciliation Status: patient was not discharged from an inpatient facility or U      Orders:  F2F for homecare     Electronically signed by:   Alix Tarango, " NP    Documentation of Face to Face and Certification for Home Health Services    I certify that patient: Ben Martínez is under my care and that I, or a nurse practitioner or physician's assistant working with me, had a face-to-face encounter that meets the physician face-to-face encounter requirements with this patient on: 1/18/2023.    This encounter with the patient was in whole, or in part, for the following medical condition, which is the primary reason for home health care: LLE wounds.    I certify that, based on my findings, the following services are medically necessary home health services: Nursing.    My clinical findings support the need for the above services because: Nurse is needed: dressing changes. .    Further, I certify that my clinical findings support that this patient is homebound (i.e. absences from home require considerable and taxing effort and are for medical reasons or Jew services or infrequently or of short duration when for other reasons) because: Requires assistance of another person or specialized equipment to access medical services because patient: Requires supervision of another for safe transfer...    Based on the above findings. I certify that this patient is confined to the home and needs intermittent skilled nursing care, physical therapy and/or speech therapy.  The patient is under my care, and I have initiated the establishment of the plan of care.  This patient will be followed by a physician who will periodically review the plan of care.  Physician/Provider to provide follow up care: Alix Tarango    Attending hospital physician (the Medicare certified [facility] provider): Alix Tarango, ARIAS  Physician Signature: See electronic signature associated with these discharge orders.  Date: 1/21/2023       No

## 2023-02-09 NOTE — PATIENT PROFILE ADULT. - VISION (WITH CORRECTIVE LENSES IF THE PATIENT USUALLY WEARS THEM):
INTERVAL HPI/OVERNIGHT EVENTS:  Pt is POD #1 OVC ablation. Postop with bleeding/small hematoma from Rt groin, currently stable with pressure dressing and saline bag. Currently SR 70s with no PVCs, no tele events noted.   Patient denies fever, chills, dizziness, syncope, chest pain, palpitations, SOB, cough, abd pain, n/v/d/c, dysuria, hematuria or unusual rash.     MEDICATIONS  (STANDING):  aspirin enteric coated 81 milliGRAM(s) Oral daily  atorvastatin 80 milliGRAM(s) Oral at bedtime  hydrochlorothiazide 12.5 milliGRAM(s) Oral daily  metoprolol succinate ER 50 milliGRAM(s) Oral daily  valsartan 80 milliGRAM(s) Oral daily  valsartan 80 milliGRAM(s) Oral once    MEDICATIONS  (PRN):  acetaminophen     Tablet .. 650 milliGRAM(s) Oral daily PRN Moderate Pain (4 - 6)      Allergies    No Known Allergies    Intolerances        REVIEW OF SYSTEMS    [x] A ten-point review of systems was otherwise negative except as noted.  [ ] Due to altered mental status/intubation, subjective information were not able to be obtained from the patient. History was obtained, to the extent possible, from review of the chart and collateral sources of information.      Vital Signs Last 24 Hrs  T(C): 37.1 (09 Feb 2023 07:10), Max: 37.1 (09 Feb 2023 00:00)  T(F): 98.7 (09 Feb 2023 07:10), Max: 98.7 (09 Feb 2023 00:00)  HR: 64 (09 Feb 2023 07:10) (63 - 75)  BP: 160/82 (09 Feb 2023 07:10) (154/78 - 160/82)  BP(mean): 114 (09 Feb 2023 07:10) (105 - 114)  RR: 19 (09 Feb 2023 07:10) (16 - 19)  SpO2: 97% (09 Feb 2023 07:10) (96% - 98%)        02-08-23 @ 07:01  -  02-09-23 @ 07:00  --------------------------------------------------------  IN: 0 mL / OUT: 450 mL / NET: -450 mL    02-09-23 @ 07:01  -  02-09-23 @ 10:59  --------------------------------------------------------  IN: 210 mL / OUT: 0 mL / NET: 210 mL        Physical Exam  GENERAL: In no apparent distress, well nourished, and hydrated.  HEART: Regular rate and rhythm; No murmurs, rubs, or gallops.  PULMONARY: Clear to auscultation and perfusion.  No rales, wheezing, or rhonchi bilaterally.  ABDOMEN: Soft, Nontender, Nondistended; Bowel sounds present  EXTREMITIES: Rt groin soft, no drainage, non tender. Left groin soft, no drainage or hematoma noted.  2+ Peripheral Pulses, No clubbing, cyanosis, or edema  NEUROLOGICAL: AO x4 ,TALLEY, speech clear    LABS:                        11.7   9.24  )-----------( 199      ( 09 Feb 2023 04:53 )             34.9         02-08-23 @ 07:01  -  02-09-23 @ 07:00  --------------------------------------------------------  IN: 0 mL / OUT: 450 mL / NET: -450 mL    02-09-23 @ 07:01 - 02-09-23 @ 10:59  --------------------------------------------------------  IN: 210 mL / OUT: 0 mL / NET: 210 mL        02-08-23 @ 07:01  -  02-09-23 @ 07:00  --------------------------------------------------------  IN: 0 mL / OUT: 450 mL / NET: -450 mL    02-09-23 @ 07:01  -  02-09-23 @ 10:59  --------------------------------------------------------  IN: 210 mL / OUT: 0 mL / NET: 210 mL        RADIOLOGY:   INTERVAL HPI/OVERNIGHT EVENTS:  Pt is POD #1 OVC ablation. Postop with small hematoma from Rt groin, currently stable with pressure dressing and saline bag. Currently SR 70s with no PVCs, no tele events noted.   Patient denies fever, chills, dizziness, syncope, chest pain, palpitations, SOB, cough, abd pain, n/v/d/c, dysuria, hematuria or unusual rash.     MEDICATIONS  (STANDING):  aspirin enteric coated 81 milliGRAM(s) Oral daily  atorvastatin 80 milliGRAM(s) Oral at bedtime  hydrochlorothiazide 12.5 milliGRAM(s) Oral daily  metoprolol succinate ER 50 milliGRAM(s) Oral daily  valsartan 80 milliGRAM(s) Oral daily  valsartan 80 milliGRAM(s) Oral once    MEDICATIONS  (PRN):  acetaminophen     Tablet .. 650 milliGRAM(s) Oral daily PRN Moderate Pain (4 - 6)      Allergies    No Known Allergies    Intolerances        REVIEW OF SYSTEMS    [x] A ten-point review of systems was otherwise negative except as noted.  [ ] Due to altered mental status/intubation, subjective information were not able to be obtained from the patient. History was obtained, to the extent possible, from review of the chart and collateral sources of information.      Vital Signs Last 24 Hrs  T(C): 37.1 (09 Feb 2023 07:10), Max: 37.1 (09 Feb 2023 00:00)  T(F): 98.7 (09 Feb 2023 07:10), Max: 98.7 (09 Feb 2023 00:00)  HR: 64 (09 Feb 2023 07:10) (63 - 75)  BP: 160/82 (09 Feb 2023 07:10) (154/78 - 160/82)  BP(mean): 114 (09 Feb 2023 07:10) (105 - 114)  RR: 19 (09 Feb 2023 07:10) (16 - 19)  SpO2: 97% (09 Feb 2023 07:10) (96% - 98%)        02-08-23 @ 07:01  -  02-09-23 @ 07:00  --------------------------------------------------------  IN: 0 mL / OUT: 450 mL / NET: -450 mL    02-09-23 @ 07:01  -  02-09-23 @ 10:59  --------------------------------------------------------  IN: 210 mL / OUT: 0 mL / NET: 210 mL        Physical Exam  GENERAL: In no apparent distress, well nourished, and hydrated.  HEART: Regular rate and rhythm; No murmurs, rubs, or gallops.  PULMONARY: Clear to auscultation and perfusion.  No rales, wheezing, or rhonchi bilaterally.  ABDOMEN: Soft, Nontender, Nondistended; Bowel sounds present  EXTREMITIES: Rt groin soft, no drainage, non tender. Left groin soft, no drainage or hematoma noted.  2+ Peripheral Pulses, No clubbing, cyanosis, or edema  NEUROLOGICAL: AO x4 ,TALLEY, speech clear    LABS:                        11.7   9.24  )-----------( 199      ( 09 Feb 2023 04:53 )             34.9         02-08-23 @ 07:01  -  02-09-23 @ 07:00  --------------------------------------------------------  IN: 0 mL / OUT: 450 mL / NET: -450 mL    02-09-23 @ 07:01  -  02-09-23 @ 10:59  --------------------------------------------------------  IN: 210 mL / OUT: 0 mL / NET: 210 mL        02-08-23 @ 07:01  -  02-09-23 @ 07:00  --------------------------------------------------------  IN: 0 mL / OUT: 450 mL / NET: -450 mL    02-09-23 @ 07:01  -  02-09-23 @ 10:59  --------------------------------------------------------  IN: 210 mL / OUT: 0 mL / NET: 210 mL        RADIOLOGY:   Partially impaired: cannot see medication labels or newsprint, but can see obstacles in path, and the surrounding layout; can count fingers at arm's length/1 pair

## 2023-03-04 NOTE — PHYSICAL THERAPY INITIAL EVALUATION ADULT - FOLLOWS COMMANDS/ANSWERS QUESTIONS, REHAB EVAL
Shift assessment completed. Pt A&O, VSS. Patient required an IV overnight. Stated pain was too unbearable to go without IV medication. PRNs admin through the night for pain control. TUMs added for heartburn/reflux. Denies any needs at this time. Bed locked and in lowest position. Call light within reach. Will continue to monitor. 100% of the time/able to follow single-step instructions

## 2023-05-16 NOTE — PATIENT PROFILE ADULT - SAFE PLACE TO LIVE
HPI:          Patient is a [de-identified] y.o. male in no acute distress. He is alert and oriented to person, place, and time. History  2008 Acute retention seen by a urologist in [place], catheter placed for a week and subsequently he was self dilating. May 2013. Cystoscopy showed lateral lobe enlargement of prostate. 6/2013 TURP      8/2014 Cysto showed fossa navicularis stricture which was dilated with scope and urojet. 2/2017 - minimal LUTS. Occasionally has dribbling/weak stream but says this seems to happen when he waits too long to void. Otherwise no complaints. No UTIs since last visit. Renal function stable at last visit. Bladder scan is markedly elevated - 700 mL - and he is unable to void. 8/2017- evaluated in the office for 6 month follow-up. At this time he was performing intermittent self cathing once per night. He did have an elevated random bladder scan of 492 ML, this is after patient voided one hour prior. He did state at home he was having small frequent voids. He was started on Flomax at this time. 8/28/2017 shields removed, instructed to perform straight cath twice per day     9/2017 consulted for patient's complaint of feeling the need to urinate, but he was not able to void. PVR was greater than 500 ML. He did present to the ER with complaints of weakness, dizziness, and fall at home. Prior to this hospital admission he had been experiencing weakness and dizziness for several weeks. Prior to presenting to the ER he denied any dysuria, gross hematuria, or difficulty urinating. Since hospital discharge he has been intermittently self cathing 3 times per day without difficulty. He is not incontinent between caths. He does continue his Flomax daily. Cardiology did not determine this as the source of his dizziness.        11/2019 cathing increased to 4 times per day     PSA   5/2023 - 7.04  10/2022 - 6.95  4/2022 - 5.63  10/2021 - 6.28  6/2020 - 4.14
no

## 2023-10-24 NOTE — CONSULT NOTE ADULT - NSICDXPILOT_GEN_ALL_CORE
[place]
Calcium 8.6 8.3 - 10.6 mg/dL   CBC with Auto Differential   Result Value Ref Range    WBC 9.3 4.0 - 11.0 K/uL    RBC 4.68 4.00 - 5.20 M/uL    Hemoglobin 13.5 12.0 - 16.0 g/dL    Hematocrit 40.3 36.0 - 48.0 %    MCV 85.9 80.0 - 100.0 fL    MCH 28.8 26.0 - 34.0 pg    MCHC 33.5 31.0 - 36.0 g/dL    RDW 15.3 12.4 - 15.4 %    Platelets 202 031 - 563 K/uL    MPV 8.8 5.0 - 10.5 fL    Neutrophils % 71.2 %    Lymphocytes % 20.4 %    Monocytes % 6.0 %    Eosinophils % 1.2 %    Basophils % 1.2 %    Neutrophils Absolute 6.7 1.7 - 7.7 K/uL    Lymphocytes Absolute 1.9 1.0 - 5.1 K/uL    Monocytes Absolute 0.6 0.0 - 1.3 K/uL    Eosinophils Absolute 0.1 0.0 - 0.6 K/uL    Basophils Absolute 0.1 0.0 - 0.2 K/uL   EKG 12 Lead   Result Value Ref Range    Ventricular Rate 95 BPM    Atrial Rate 95 BPM    P-R Interval 180 ms    QRS Duration 90 ms    Q-T Interval 410 ms    QTc Calculation (Bazett) 515 ms    P Axis 34 degrees    R Axis -3 degrees    T Axis 39 degrees    Diagnosis       Normal sinus rhythmProlonged QTAbnormal ECGConfirmed by Tamara López MD, Raheem Juan ([id number]) on 9/24/2023 12:25:56 PM        Patient Active Problem List   Diagnosis    CLL (chronic lymphocytic leukemia) (HCC)    Obesity (BMI 35.0-39.9 without comorbidity)    Benign essential HTN    Vitamin D insufficiency    Vaccine counseling    Seasonal allergies    Hyperlipidemia LDL goal <100    Encounter for nail care    Paroxysmal A-fib (HCC)    Severe obesity (BMI 35.0-39. 9) with comorbidity ([address])    Hydronephrosis of right kidney    Hydroureter on right    MARISOL (acute kidney injury) [facility])    Hospital discharge follow-up    Stress at home       Medications listed as ordered at the time of discharge from hospital     Medication List            Accurate as of October 24, 2023 11:59 PM. If you have any questions, ask your nurse or doctor.                 CONTINUE taking these medications      apixaban 5 MG Tabs tablet  Commonly known as: Eliquis  TAKE 1 TABLET BY MOUTH TWO TIMES A
[place]

## 2023-11-05 VITALS
HEART RATE: 123 BPM | TEMPERATURE: 100 F | RESPIRATION RATE: 18 BRPM | SYSTOLIC BLOOD PRESSURE: 139 MMHG | DIASTOLIC BLOOD PRESSURE: 117 MMHG | OXYGEN SATURATION: 86 %

## 2023-11-05 PROCEDURE — 99285 EMERGENCY DEPT VISIT HI MDM: CPT

## 2023-11-05 NOTE — ED ADULT TRIAGE NOTE - CHIEF COMPLAINT QUOTE
pt presents to the ED for SOB. as per EMS pt was sating in 80s - placed on NC - up to 90s. hx of dementia. poor historian. daughter in route. pt A&Ox2 at baseline, well appearing, and weak at baseline with no further complaints or discomforts reported at this time.

## 2023-11-06 ENCOUNTER — INPATIENT (INPATIENT)
Facility: HOSPITAL | Age: 78
LOS: 6 days | Discharge: HOSPICE | DRG: 871 | End: 2023-11-13
Attending: FAMILY MEDICINE | Admitting: INTERNAL MEDICINE
Payer: MEDICARE

## 2023-11-06 DIAGNOSIS — J18.9 PNEUMONIA, UNSPECIFIED ORGANISM: ICD-10-CM

## 2023-11-06 DIAGNOSIS — R79.89 OTHER SPECIFIED ABNORMAL FINDINGS OF BLOOD CHEMISTRY: ICD-10-CM

## 2023-11-06 DIAGNOSIS — F03.90 UNSPECIFIED DEMENTIA, UNSPECIFIED SEVERITY, WITHOUT BEHAVIORAL DISTURBANCE, PSYCHOTIC DISTURBANCE, MOOD DISTURBANCE, AND ANXIETY: ICD-10-CM

## 2023-11-06 DIAGNOSIS — T82.898A OTHER SPECIFIED COMPLICATION OF VASCULAR PROSTHETIC DEVICES, IMPLANTS AND GRAFTS, INITIAL ENCOUNTER: Chronic | ICD-10-CM

## 2023-11-06 DIAGNOSIS — I50.9 HEART FAILURE, UNSPECIFIED: ICD-10-CM

## 2023-11-06 LAB
ALBUMIN SERPL ELPH-MCNC: 2.7 G/DL — LOW (ref 3.3–5)
ALBUMIN SERPL ELPH-MCNC: 2.7 G/DL — LOW (ref 3.3–5)
ALP SERPL-CCNC: 67 U/L — SIGNIFICANT CHANGE UP (ref 40–120)
ALP SERPL-CCNC: 67 U/L — SIGNIFICANT CHANGE UP (ref 40–120)
ALT FLD-CCNC: 11 U/L — LOW (ref 12–78)
ALT FLD-CCNC: 11 U/L — LOW (ref 12–78)
ANION GAP SERPL CALC-SCNC: 4 MMOL/L — LOW (ref 5–17)
ANION GAP SERPL CALC-SCNC: 4 MMOL/L — LOW (ref 5–17)
ANION GAP SERPL CALC-SCNC: 7 MMOL/L — SIGNIFICANT CHANGE UP (ref 5–17)
ANION GAP SERPL CALC-SCNC: 7 MMOL/L — SIGNIFICANT CHANGE UP (ref 5–17)
APPEARANCE UR: CLEAR — SIGNIFICANT CHANGE UP
APPEARANCE UR: CLEAR — SIGNIFICANT CHANGE UP
APTT BLD: 32.4 SEC — SIGNIFICANT CHANGE UP (ref 24.5–35.6)
APTT BLD: 32.4 SEC — SIGNIFICANT CHANGE UP (ref 24.5–35.6)
AST SERPL-CCNC: 16 U/L — SIGNIFICANT CHANGE UP (ref 15–37)
AST SERPL-CCNC: 16 U/L — SIGNIFICANT CHANGE UP (ref 15–37)
BASOPHILS # BLD AUTO: 0.09 K/UL — SIGNIFICANT CHANGE UP (ref 0–0.2)
BASOPHILS # BLD AUTO: 0.09 K/UL — SIGNIFICANT CHANGE UP (ref 0–0.2)
BASOPHILS NFR BLD AUTO: 0.6 % — SIGNIFICANT CHANGE UP (ref 0–2)
BASOPHILS NFR BLD AUTO: 0.6 % — SIGNIFICANT CHANGE UP (ref 0–2)
BILIRUB SERPL-MCNC: 0.5 MG/DL — SIGNIFICANT CHANGE UP (ref 0.2–1.2)
BILIRUB SERPL-MCNC: 0.5 MG/DL — SIGNIFICANT CHANGE UP (ref 0.2–1.2)
BILIRUB UR-MCNC: NEGATIVE — SIGNIFICANT CHANGE UP
BILIRUB UR-MCNC: NEGATIVE — SIGNIFICANT CHANGE UP
BUN SERPL-MCNC: 14 MG/DL — SIGNIFICANT CHANGE UP (ref 7–23)
BUN SERPL-MCNC: 14 MG/DL — SIGNIFICANT CHANGE UP (ref 7–23)
BUN SERPL-MCNC: 15 MG/DL — SIGNIFICANT CHANGE UP (ref 7–23)
BUN SERPL-MCNC: 15 MG/DL — SIGNIFICANT CHANGE UP (ref 7–23)
CALCIUM SERPL-MCNC: 8.3 MG/DL — LOW (ref 8.5–10.1)
CALCIUM SERPL-MCNC: 8.3 MG/DL — LOW (ref 8.5–10.1)
CALCIUM SERPL-MCNC: 8.6 MG/DL — SIGNIFICANT CHANGE UP (ref 8.5–10.1)
CALCIUM SERPL-MCNC: 8.6 MG/DL — SIGNIFICANT CHANGE UP (ref 8.5–10.1)
CHLORIDE SERPL-SCNC: 103 MMOL/L — SIGNIFICANT CHANGE UP (ref 96–108)
CHLORIDE SERPL-SCNC: 103 MMOL/L — SIGNIFICANT CHANGE UP (ref 96–108)
CHLORIDE SERPL-SCNC: 99 MMOL/L — SIGNIFICANT CHANGE UP (ref 96–108)
CHLORIDE SERPL-SCNC: 99 MMOL/L — SIGNIFICANT CHANGE UP (ref 96–108)
CO2 SERPL-SCNC: 25 MMOL/L — SIGNIFICANT CHANGE UP (ref 22–31)
COLOR SPEC: YELLOW — SIGNIFICANT CHANGE UP
COLOR SPEC: YELLOW — SIGNIFICANT CHANGE UP
CREAT SERPL-MCNC: 0.62 MG/DL — SIGNIFICANT CHANGE UP (ref 0.5–1.3)
DIFF PNL FLD: NEGATIVE — SIGNIFICANT CHANGE UP
DIFF PNL FLD: NEGATIVE — SIGNIFICANT CHANGE UP
EGFR: 91 ML/MIN/1.73M2 — SIGNIFICANT CHANGE UP
EOSINOPHIL # BLD AUTO: 0.35 K/UL — SIGNIFICANT CHANGE UP (ref 0–0.5)
EOSINOPHIL # BLD AUTO: 0.35 K/UL — SIGNIFICANT CHANGE UP (ref 0–0.5)
EOSINOPHIL NFR BLD AUTO: 2.4 % — SIGNIFICANT CHANGE UP (ref 0–6)
EOSINOPHIL NFR BLD AUTO: 2.4 % — SIGNIFICANT CHANGE UP (ref 0–6)
GLUCOSE BLDC GLUCOMTR-MCNC: 204 MG/DL — HIGH (ref 70–99)
GLUCOSE BLDC GLUCOMTR-MCNC: 204 MG/DL — HIGH (ref 70–99)
GLUCOSE BLDC GLUCOMTR-MCNC: 207 MG/DL — HIGH (ref 70–99)
GLUCOSE BLDC GLUCOMTR-MCNC: 207 MG/DL — HIGH (ref 70–99)
GLUCOSE BLDC GLUCOMTR-MCNC: 211 MG/DL — HIGH (ref 70–99)
GLUCOSE BLDC GLUCOMTR-MCNC: 211 MG/DL — HIGH (ref 70–99)
GLUCOSE SERPL-MCNC: 221 MG/DL — HIGH (ref 70–99)
GLUCOSE SERPL-MCNC: 221 MG/DL — HIGH (ref 70–99)
GLUCOSE SERPL-MCNC: 241 MG/DL — HIGH (ref 70–99)
GLUCOSE SERPL-MCNC: 241 MG/DL — HIGH (ref 70–99)
GLUCOSE UR QL: 250 MG/DL
GLUCOSE UR QL: 250 MG/DL
HCT VFR BLD CALC: 39.9 % — SIGNIFICANT CHANGE UP (ref 34.5–45)
HCT VFR BLD CALC: 39.9 % — SIGNIFICANT CHANGE UP (ref 34.5–45)
HGB BLD-MCNC: 13.9 G/DL — SIGNIFICANT CHANGE UP (ref 11.5–15.5)
HGB BLD-MCNC: 13.9 G/DL — SIGNIFICANT CHANGE UP (ref 11.5–15.5)
IMM GRANULOCYTES NFR BLD AUTO: 0.3 % — SIGNIFICANT CHANGE UP (ref 0–0.9)
IMM GRANULOCYTES NFR BLD AUTO: 0.3 % — SIGNIFICANT CHANGE UP (ref 0–0.9)
INR BLD: 1.16 RATIO — SIGNIFICANT CHANGE UP (ref 0.85–1.18)
INR BLD: 1.16 RATIO — SIGNIFICANT CHANGE UP (ref 0.85–1.18)
KETONES UR-MCNC: NEGATIVE MG/DL — SIGNIFICANT CHANGE UP
KETONES UR-MCNC: NEGATIVE MG/DL — SIGNIFICANT CHANGE UP
LACTATE SERPL-SCNC: 1.2 MMOL/L — SIGNIFICANT CHANGE UP (ref 0.7–2)
LACTATE SERPL-SCNC: 1.2 MMOL/L — SIGNIFICANT CHANGE UP (ref 0.7–2)
LEUKOCYTE ESTERASE UR-ACNC: NEGATIVE — SIGNIFICANT CHANGE UP
LEUKOCYTE ESTERASE UR-ACNC: NEGATIVE — SIGNIFICANT CHANGE UP
LYMPHOCYTES # BLD AUTO: 0.77 K/UL — LOW (ref 1–3.3)
LYMPHOCYTES # BLD AUTO: 0.77 K/UL — LOW (ref 1–3.3)
LYMPHOCYTES # BLD AUTO: 5.3 % — LOW (ref 13–44)
LYMPHOCYTES # BLD AUTO: 5.3 % — LOW (ref 13–44)
MCHC RBC-ENTMCNC: 31.4 PG — SIGNIFICANT CHANGE UP (ref 27–34)
MCHC RBC-ENTMCNC: 31.4 PG — SIGNIFICANT CHANGE UP (ref 27–34)
MCHC RBC-ENTMCNC: 34.8 GM/DL — SIGNIFICANT CHANGE UP (ref 32–36)
MCHC RBC-ENTMCNC: 34.8 GM/DL — SIGNIFICANT CHANGE UP (ref 32–36)
MCV RBC AUTO: 90.3 FL — SIGNIFICANT CHANGE UP (ref 80–100)
MCV RBC AUTO: 90.3 FL — SIGNIFICANT CHANGE UP (ref 80–100)
MONOCYTES # BLD AUTO: 0.76 K/UL — SIGNIFICANT CHANGE UP (ref 0–0.9)
MONOCYTES # BLD AUTO: 0.76 K/UL — SIGNIFICANT CHANGE UP (ref 0–0.9)
MONOCYTES NFR BLD AUTO: 5.3 % — SIGNIFICANT CHANGE UP (ref 2–14)
MONOCYTES NFR BLD AUTO: 5.3 % — SIGNIFICANT CHANGE UP (ref 2–14)
NEUTROPHILS # BLD AUTO: 12.44 K/UL — HIGH (ref 1.8–7.4)
NEUTROPHILS # BLD AUTO: 12.44 K/UL — HIGH (ref 1.8–7.4)
NEUTROPHILS NFR BLD AUTO: 86.1 % — HIGH (ref 43–77)
NEUTROPHILS NFR BLD AUTO: 86.1 % — HIGH (ref 43–77)
NITRITE UR-MCNC: NEGATIVE — SIGNIFICANT CHANGE UP
NITRITE UR-MCNC: NEGATIVE — SIGNIFICANT CHANGE UP
NT-PROBNP SERPL-SCNC: 7472 PG/ML — HIGH (ref 0–450)
NT-PROBNP SERPL-SCNC: 7472 PG/ML — HIGH (ref 0–450)
PH UR: 5.5 — SIGNIFICANT CHANGE UP (ref 5–8)
PH UR: 5.5 — SIGNIFICANT CHANGE UP (ref 5–8)
PLATELET # BLD AUTO: 333 K/UL — SIGNIFICANT CHANGE UP (ref 150–400)
PLATELET # BLD AUTO: 333 K/UL — SIGNIFICANT CHANGE UP (ref 150–400)
POTASSIUM SERPL-MCNC: 4.3 MMOL/L — SIGNIFICANT CHANGE UP (ref 3.5–5.3)
POTASSIUM SERPL-MCNC: 4.3 MMOL/L — SIGNIFICANT CHANGE UP (ref 3.5–5.3)
POTASSIUM SERPL-MCNC: 4.6 MMOL/L — SIGNIFICANT CHANGE UP (ref 3.5–5.3)
POTASSIUM SERPL-MCNC: 4.6 MMOL/L — SIGNIFICANT CHANGE UP (ref 3.5–5.3)
POTASSIUM SERPL-SCNC: 4.3 MMOL/L — SIGNIFICANT CHANGE UP (ref 3.5–5.3)
POTASSIUM SERPL-SCNC: 4.3 MMOL/L — SIGNIFICANT CHANGE UP (ref 3.5–5.3)
POTASSIUM SERPL-SCNC: 4.6 MMOL/L — SIGNIFICANT CHANGE UP (ref 3.5–5.3)
POTASSIUM SERPL-SCNC: 4.6 MMOL/L — SIGNIFICANT CHANGE UP (ref 3.5–5.3)
PROT SERPL-MCNC: 6.8 GM/DL — SIGNIFICANT CHANGE UP (ref 6–8.3)
PROT SERPL-MCNC: 6.8 GM/DL — SIGNIFICANT CHANGE UP (ref 6–8.3)
PROT UR-MCNC: NEGATIVE MG/DL — SIGNIFICANT CHANGE UP
PROT UR-MCNC: NEGATIVE MG/DL — SIGNIFICANT CHANGE UP
PROTHROM AB SERPL-ACNC: 13.1 SEC — HIGH (ref 9.5–13)
PROTHROM AB SERPL-ACNC: 13.1 SEC — HIGH (ref 9.5–13)
RAPID RVP RESULT: SIGNIFICANT CHANGE UP
RAPID RVP RESULT: SIGNIFICANT CHANGE UP
RBC # BLD: 4.42 M/UL — SIGNIFICANT CHANGE UP (ref 3.8–5.2)
RBC # BLD: 4.42 M/UL — SIGNIFICANT CHANGE UP (ref 3.8–5.2)
RBC # FLD: 12 % — SIGNIFICANT CHANGE UP (ref 10.3–14.5)
RBC # FLD: 12 % — SIGNIFICANT CHANGE UP (ref 10.3–14.5)
SARS-COV-2 RNA SPEC QL NAA+PROBE: SIGNIFICANT CHANGE UP
SARS-COV-2 RNA SPEC QL NAA+PROBE: SIGNIFICANT CHANGE UP
SODIUM SERPL-SCNC: 131 MMOL/L — LOW (ref 135–145)
SODIUM SERPL-SCNC: 131 MMOL/L — LOW (ref 135–145)
SODIUM SERPL-SCNC: 132 MMOL/L — LOW (ref 135–145)
SODIUM SERPL-SCNC: 132 MMOL/L — LOW (ref 135–145)
SP GR SPEC: 1.02 — SIGNIFICANT CHANGE UP (ref 1–1.03)
SP GR SPEC: 1.02 — SIGNIFICANT CHANGE UP (ref 1–1.03)
TROPONIN I, HIGH SENSITIVITY RESULT: 93.99 NG/L — HIGH
TROPONIN I, HIGH SENSITIVITY RESULT: 93.99 NG/L — HIGH
TROPONIN I, HIGH SENSITIVITY RESULT: HIGH NG/L
UROBILINOGEN FLD QL: 0.2 MG/DL — SIGNIFICANT CHANGE UP (ref 0.2–1)
UROBILINOGEN FLD QL: 0.2 MG/DL — SIGNIFICANT CHANGE UP (ref 0.2–1)
WBC # BLD: 14.46 K/UL — HIGH (ref 3.8–10.5)
WBC # BLD: 14.46 K/UL — HIGH (ref 3.8–10.5)
WBC # FLD AUTO: 14.46 K/UL — HIGH (ref 3.8–10.5)
WBC # FLD AUTO: 14.46 K/UL — HIGH (ref 3.8–10.5)

## 2023-11-06 PROCEDURE — 82607 VITAMIN B-12: CPT

## 2023-11-06 PROCEDURE — 83540 ASSAY OF IRON: CPT

## 2023-11-06 PROCEDURE — 87086 URINE CULTURE/COLONY COUNT: CPT

## 2023-11-06 PROCEDURE — 85025 COMPLETE CBC W/AUTO DIFF WBC: CPT

## 2023-11-06 PROCEDURE — 86140 C-REACTIVE PROTEIN: CPT

## 2023-11-06 PROCEDURE — 71275 CT ANGIOGRAPHY CHEST: CPT | Mod: 26,MA

## 2023-11-06 PROCEDURE — 83036 HEMOGLOBIN GLYCOSYLATED A1C: CPT

## 2023-11-06 PROCEDURE — 82746 ASSAY OF FOLIC ACID SERUM: CPT

## 2023-11-06 PROCEDURE — 82962 GLUCOSE BLOOD TEST: CPT

## 2023-11-06 PROCEDURE — 84145 PROCALCITONIN (PCT): CPT

## 2023-11-06 PROCEDURE — 80053 COMPREHEN METABOLIC PANEL: CPT

## 2023-11-06 PROCEDURE — 80061 LIPID PANEL: CPT

## 2023-11-06 PROCEDURE — 81003 URINALYSIS AUTO W/O SCOPE: CPT

## 2023-11-06 PROCEDURE — 84484 ASSAY OF TROPONIN QUANT: CPT

## 2023-11-06 PROCEDURE — 82306 VITAMIN D 25 HYDROXY: CPT

## 2023-11-06 PROCEDURE — 82550 ASSAY OF CK (CPK): CPT

## 2023-11-06 PROCEDURE — 93005 ELECTROCARDIOGRAM TRACING: CPT

## 2023-11-06 PROCEDURE — 71045 X-RAY EXAM CHEST 1 VIEW: CPT | Mod: 26

## 2023-11-06 PROCEDURE — 74177 CT ABD & PELVIS W/CONTRAST: CPT | Mod: 26,MA

## 2023-11-06 PROCEDURE — 93306 TTE W/DOPPLER COMPLETE: CPT

## 2023-11-06 PROCEDURE — 99497 ADVNCD CARE PLAN 30 MIN: CPT | Mod: 25

## 2023-11-06 PROCEDURE — 80048 BASIC METABOLIC PNL TOTAL CA: CPT

## 2023-11-06 PROCEDURE — 83735 ASSAY OF MAGNESIUM: CPT

## 2023-11-06 PROCEDURE — 36415 COLL VENOUS BLD VENIPUNCTURE: CPT

## 2023-11-06 PROCEDURE — 83880 ASSAY OF NATRIURETIC PEPTIDE: CPT

## 2023-11-06 PROCEDURE — 82728 ASSAY OF FERRITIN: CPT

## 2023-11-06 PROCEDURE — 84100 ASSAY OF PHOSPHORUS: CPT

## 2023-11-06 PROCEDURE — 99223 1ST HOSP IP/OBS HIGH 75: CPT | Mod: AI

## 2023-11-06 PROCEDURE — 82803 BLOOD GASES ANY COMBINATION: CPT

## 2023-11-06 PROCEDURE — 71045 X-RAY EXAM CHEST 1 VIEW: CPT

## 2023-11-06 PROCEDURE — 83550 IRON BINDING TEST: CPT

## 2023-11-06 PROCEDURE — 94760 N-INVAS EAR/PLS OXIMETRY 1: CPT

## 2023-11-06 PROCEDURE — 82140 ASSAY OF AMMONIA: CPT

## 2023-11-06 PROCEDURE — 84443 ASSAY THYROID STIM HORMONE: CPT

## 2023-11-06 PROCEDURE — 99223 1ST HOSP IP/OBS HIGH 75: CPT

## 2023-11-06 PROCEDURE — 93010 ELECTROCARDIOGRAM REPORT: CPT

## 2023-11-06 PROCEDURE — 85027 COMPLETE CBC AUTOMATED: CPT

## 2023-11-06 RX ORDER — AZITHROMYCIN 500 MG/1
500 TABLET, FILM COATED ORAL EVERY 24 HOURS
Refills: 0 | Status: DISCONTINUED | OUTPATIENT
Start: 2023-11-06 | End: 2023-11-08

## 2023-11-06 RX ORDER — INSULIN GLARGINE 100 [IU]/ML
10 INJECTION, SOLUTION SUBCUTANEOUS AT BEDTIME
Refills: 0 | Status: DISCONTINUED | OUTPATIENT
Start: 2023-11-06 | End: 2023-11-13

## 2023-11-06 RX ORDER — CLOPIDOGREL BISULFATE 75 MG/1
75 TABLET, FILM COATED ORAL DAILY
Refills: 0 | Status: DISCONTINUED | OUTPATIENT
Start: 2023-11-07 | End: 2023-11-13

## 2023-11-06 RX ORDER — INSULIN LISPRO 100/ML
VIAL (ML) SUBCUTANEOUS AT BEDTIME
Refills: 0 | Status: DISCONTINUED | OUTPATIENT
Start: 2023-11-06 | End: 2023-11-13

## 2023-11-06 RX ORDER — HEPARIN SODIUM 5000 [USP'U]/ML
4700 INJECTION INTRAVENOUS; SUBCUTANEOUS EVERY 6 HOURS
Refills: 0 | Status: DISCONTINUED | OUTPATIENT
Start: 2023-11-06 | End: 2023-11-06

## 2023-11-06 RX ORDER — ENOXAPARIN SODIUM 100 MG/ML
40 INJECTION SUBCUTANEOUS EVERY 24 HOURS
Refills: 0 | Status: DISCONTINUED | OUTPATIENT
Start: 2023-11-06 | End: 2023-11-06

## 2023-11-06 RX ORDER — GLUCAGON INJECTION, SOLUTION 0.5 MG/.1ML
1 INJECTION, SOLUTION SUBCUTANEOUS ONCE
Refills: 0 | Status: DISCONTINUED | OUTPATIENT
Start: 2023-11-06 | End: 2023-11-13

## 2023-11-06 RX ORDER — AZITHROMYCIN 500 MG/1
500 TABLET, FILM COATED ORAL EVERY 24 HOURS
Refills: 0 | Status: DISCONTINUED | OUTPATIENT
Start: 2023-11-06 | End: 2023-11-06

## 2023-11-06 RX ORDER — CEFTRIAXONE 500 MG/1
1000 INJECTION, POWDER, FOR SOLUTION INTRAMUSCULAR; INTRAVENOUS EVERY 24 HOURS
Refills: 0 | Status: DISCONTINUED | OUTPATIENT
Start: 2023-11-06 | End: 2023-11-06

## 2023-11-06 RX ORDER — ATORVASTATIN CALCIUM 80 MG/1
40 TABLET, FILM COATED ORAL AT BEDTIME
Refills: 0 | Status: DISCONTINUED | OUTPATIENT
Start: 2023-11-06 | End: 2023-11-13

## 2023-11-06 RX ORDER — DEXTROSE 50 % IN WATER 50 %
25 SYRINGE (ML) INTRAVENOUS ONCE
Refills: 0 | Status: DISCONTINUED | OUTPATIENT
Start: 2023-11-06 | End: 2023-11-13

## 2023-11-06 RX ORDER — AZITHROMYCIN 500 MG/1
500 TABLET, FILM COATED ORAL ONCE
Refills: 0 | Status: COMPLETED | OUTPATIENT
Start: 2023-11-06 | End: 2023-11-06

## 2023-11-06 RX ORDER — SODIUM CHLORIDE 9 MG/ML
1000 INJECTION, SOLUTION INTRAVENOUS
Refills: 0 | Status: DISCONTINUED | OUTPATIENT
Start: 2023-11-06 | End: 2023-11-13

## 2023-11-06 RX ORDER — SODIUM CHLORIDE 9 MG/ML
500 INJECTION INTRAMUSCULAR; INTRAVENOUS; SUBCUTANEOUS ONCE
Refills: 0 | Status: COMPLETED | OUTPATIENT
Start: 2023-11-06 | End: 2023-11-06

## 2023-11-06 RX ORDER — DEXTROSE 50 % IN WATER 50 %
12.5 SYRINGE (ML) INTRAVENOUS ONCE
Refills: 0 | Status: DISCONTINUED | OUTPATIENT
Start: 2023-11-06 | End: 2023-11-13

## 2023-11-06 RX ORDER — CEFTRIAXONE 500 MG/1
1000 INJECTION, POWDER, FOR SOLUTION INTRAMUSCULAR; INTRAVENOUS EVERY 24 HOURS
Refills: 0 | Status: COMPLETED | OUTPATIENT
Start: 2023-11-06 | End: 2023-11-10

## 2023-11-06 RX ORDER — DEXTROSE 50 % IN WATER 50 %
15 SYRINGE (ML) INTRAVENOUS ONCE
Refills: 0 | Status: DISCONTINUED | OUTPATIENT
Start: 2023-11-06 | End: 2023-11-13

## 2023-11-06 RX ORDER — INFLUENZA VIRUS VACCINE 15; 15; 15; 15 UG/.5ML; UG/.5ML; UG/.5ML; UG/.5ML
0.7 SUSPENSION INTRAMUSCULAR ONCE
Refills: 0 | Status: DISCONTINUED | OUTPATIENT
Start: 2023-11-06 | End: 2023-11-13

## 2023-11-06 RX ORDER — ONDANSETRON 8 MG/1
4 TABLET, FILM COATED ORAL EVERY 6 HOURS
Refills: 0 | Status: DISCONTINUED | OUTPATIENT
Start: 2023-11-06 | End: 2023-11-06

## 2023-11-06 RX ORDER — SODIUM CHLORIDE 9 MG/ML
2000 INJECTION INTRAMUSCULAR; INTRAVENOUS; SUBCUTANEOUS ONCE
Refills: 0 | Status: COMPLETED | OUTPATIENT
Start: 2023-11-06 | End: 2023-11-06

## 2023-11-06 RX ORDER — HEPARIN SODIUM 5000 [USP'U]/ML
INJECTION INTRAVENOUS; SUBCUTANEOUS
Qty: 25000 | Refills: 0 | Status: DISCONTINUED | OUTPATIENT
Start: 2023-11-06 | End: 2023-11-06

## 2023-11-06 RX ORDER — QUETIAPINE FUMARATE 200 MG/1
50 TABLET, FILM COATED ORAL AT BEDTIME
Refills: 0 | Status: DISCONTINUED | OUTPATIENT
Start: 2023-11-06 | End: 2023-11-07

## 2023-11-06 RX ORDER — ONDANSETRON 8 MG/1
4 TABLET, FILM COATED ORAL EVERY 6 HOURS
Refills: 0 | Status: DISCONTINUED | OUTPATIENT
Start: 2023-11-06 | End: 2023-11-13

## 2023-11-06 RX ORDER — ASPIRIN/CALCIUM CARB/MAGNESIUM 324 MG
81 TABLET ORAL DAILY
Refills: 0 | Status: DISCONTINUED | OUTPATIENT
Start: 2023-11-06 | End: 2023-11-13

## 2023-11-06 RX ORDER — HEPARIN SODIUM 5000 [USP'U]/ML
4700 INJECTION INTRAVENOUS; SUBCUTANEOUS ONCE
Refills: 0 | Status: DISCONTINUED | OUTPATIENT
Start: 2023-11-06 | End: 2023-11-06

## 2023-11-06 RX ORDER — ACETAMINOPHEN 500 MG
650 TABLET ORAL EVERY 6 HOURS
Refills: 0 | Status: DISCONTINUED | OUTPATIENT
Start: 2023-11-06 | End: 2023-11-13

## 2023-11-06 RX ORDER — ENOXAPARIN SODIUM 100 MG/ML
80 INJECTION SUBCUTANEOUS EVERY 12 HOURS
Refills: 0 | Status: DISCONTINUED | OUTPATIENT
Start: 2023-11-06 | End: 2023-11-10

## 2023-11-06 RX ORDER — CEFTRIAXONE 500 MG/1
1000 INJECTION, POWDER, FOR SOLUTION INTRAMUSCULAR; INTRAVENOUS ONCE
Refills: 0 | Status: COMPLETED | OUTPATIENT
Start: 2023-11-06 | End: 2023-11-06

## 2023-11-06 RX ORDER — CLOPIDOGREL BISULFATE 75 MG/1
300 TABLET, FILM COATED ORAL ONCE
Refills: 0 | Status: COMPLETED | OUTPATIENT
Start: 2023-11-06 | End: 2023-11-06

## 2023-11-06 RX ORDER — INSULIN LISPRO 100/ML
VIAL (ML) SUBCUTANEOUS
Refills: 0 | Status: DISCONTINUED | OUTPATIENT
Start: 2023-11-06 | End: 2023-11-13

## 2023-11-06 RX ORDER — ACETAMINOPHEN 500 MG
1000 TABLET ORAL ONCE
Refills: 0 | Status: COMPLETED | OUTPATIENT
Start: 2023-11-06 | End: 2023-11-06

## 2023-11-06 RX ADMIN — ENOXAPARIN SODIUM 80 MILLIGRAM(S): 100 INJECTION SUBCUTANEOUS at 21:52

## 2023-11-06 RX ADMIN — Medication 81 MILLIGRAM(S): at 11:41

## 2023-11-06 RX ADMIN — Medication 2: at 11:42

## 2023-11-06 RX ADMIN — ONDANSETRON 4 MILLIGRAM(S): 8 TABLET, FILM COATED ORAL at 13:20

## 2023-11-06 RX ADMIN — Medication 400 MILLIGRAM(S): at 00:50

## 2023-11-06 RX ADMIN — QUETIAPINE FUMARATE 50 MILLIGRAM(S): 200 TABLET, FILM COATED ORAL at 21:51

## 2023-11-06 RX ADMIN — ATORVASTATIN CALCIUM 40 MILLIGRAM(S): 80 TABLET, FILM COATED ORAL at 21:51

## 2023-11-06 RX ADMIN — Medication 2: at 16:55

## 2023-11-06 RX ADMIN — AZITHROMYCIN 255 MILLIGRAM(S): 500 TABLET, FILM COATED ORAL at 01:18

## 2023-11-06 RX ADMIN — CEFTRIAXONE 1000 MILLIGRAM(S): 500 INJECTION, POWDER, FOR SOLUTION INTRAMUSCULAR; INTRAVENOUS at 03:03

## 2023-11-06 RX ADMIN — Medication 1000 MILLIGRAM(S): at 00:50

## 2023-11-06 RX ADMIN — INSULIN GLARGINE 10 UNIT(S): 100 INJECTION, SOLUTION SUBCUTANEOUS at 21:52

## 2023-11-06 RX ADMIN — CEFTRIAXONE 1000 MILLIGRAM(S): 500 INJECTION, POWDER, FOR SOLUTION INTRAMUSCULAR; INTRAVENOUS at 21:52

## 2023-11-06 RX ADMIN — ONDANSETRON 4 MILLIGRAM(S): 8 TABLET, FILM COATED ORAL at 22:04

## 2023-11-06 RX ADMIN — AZITHROMYCIN 255 MILLIGRAM(S): 500 TABLET, FILM COATED ORAL at 21:52

## 2023-11-06 RX ADMIN — SODIUM CHLORIDE 500 MILLILITER(S): 9 INJECTION INTRAMUSCULAR; INTRAVENOUS; SUBCUTANEOUS at 03:04

## 2023-11-06 RX ADMIN — CLOPIDOGREL BISULFATE 300 MILLIGRAM(S): 75 TABLET, FILM COATED ORAL at 15:06

## 2023-11-06 RX ADMIN — SODIUM CHLORIDE 2000 MILLILITER(S): 9 INJECTION INTRAMUSCULAR; INTRAVENOUS; SUBCUTANEOUS at 00:29

## 2023-11-06 NOTE — CHART NOTE - NSCHARTNOTEFT_GEN_A_CORE
Second troponin came back highly positive at >12,000.  First trop was 94.  ASA, statin, Lovenox, Plavix ordered.  I spoke to the patient's daughter Soumya and informed her of troponin result.  She is not interested in a cardiac cath or any invasive cardiac procedures.  She agrees with IV ABXs for now.  She wants to take a less aggressive approach and medical management for now.  I discussed a consultation with Palliative Medicine and she is agreeable.  She is interested in trying to get her mother back home when she is medically stable.  Case d/w Jyoti Rendon NP and consult placed for Palliative Medicine.

## 2023-11-06 NOTE — H&P ADULT - HISTORY OF PRESENT ILLNESS
78 F with Hx of Dementia has been bedbound for about 2 years since getting COVID in 2021.  She is at baseline knows her name and is oriented to person only.  Hx obtained from speaking to the patient's daughter as patient is unable to provide any significant history.  She lives at home with her daughter and she has an aide.  She has not left the house in 2 years.  She was brought to the ED because of shortness of breath, cough and she had one episode of vomiting.  No reported abdominal pain.  No reported fevers, chills, shakes, diarrhea or dysuria.      In the ED, she was noted to be hypoxic and had a fever to 102 and was tachycardic.  Imaging revealed a concern for multifocal PNA.  She was treated with IV Rocephin, IV Zithromax.  She did become hypotensive in the ED with a SBP in the 80s, and was treated with a total of 2.5L of NS.  BP has improved.  Lactate was normal.  Initial trop was 94 and BNP was 7472.    PAST MEDICAL HISTORY:  Advanced Dementia  Diabetes Mellitus on Levemir  Hx of old CVA  Chronic LBBB on EKG  Hx of breast cancer many years ago, s/p surgery  COVID PNA in 2021  ECHO from 2021 - EF 60%, severe AS, moderate MS, mild MR    PAST SURGICAL HISTORY:  s/p breast cancer surgery  s/p carotid stent placement    FAMILY HISTORY:   Unable to determine due to patient's mental status at this time

## 2023-11-06 NOTE — H&P ADULT - ASSESSMENT
Acute Sepsis due to CAP    Acute Hypoxic Respiratory Failure due to combination of CAP and Decompensated CHF    Acute Decompensated CHF, likely due to severe aortic stenosis, mitral stenosis and diastolic dysfunction    Elevated troponin likely due to demand ischemia, r/o MI    Type 2 Diabetes Mellitus    New left breast 3.5cm mass in patient with Hx of breast cancer, concern for recurrence    Acute Metabolic Encephalopathy due to dementia, hypoxia and sepsis    Hypoalbuminemia    Mild Hyponatremia      PLAN:  -  admit to tele, hospitalist service, and inpatient  -  serial cardiac enzymes, r/o MI  -  cardio consult - Dr. Bautista  -  unable to diurese at this time given the hypotension, if BP stays stable, will start IV Lasix  -  IV Rocephin and IV Zithromax  -  OOB to chair  -  Aspiration and Fall precautions  -  Strict Is/Os  -  check daily weights  -  convert Levemir to Lantus  -  check sugars qAC/Hs, Admelog SSI  -  DVT prophylaxis - venodynes and Lovenox  -  follow up blood cultures  -  Tylenol PRN  -  Nasal cannula O2  -  check labs in am  -  GOC / Advance Directives - spoke with patient's daughter Soumya who is acting as the HCP as patient lacks decisional capacity.  Her number is [phone].  She is aware of the new left breast mass and at this time is likely not interested in working it up and will discuss with her brother Finn.  Also, she made it clear, that patient's wishes were to be DNR/DNI, and therefore I completed a MOLST form on this admission and placed in the chart    d/w patient, ED RN and daughter Soumya     Acute Sepsis due to CAP    Acute Hypoxic Respiratory Failure due to combination of CAP and Decompensated CHF    Acute Decompensated CHF, likely due to severe aortic stenosis, mitral stenosis and diastolic dysfunction    Elevated troponin likely due to demand ischemia, r/o MI    Type 2 Diabetes Mellitus    New left breast 3.5cm mass in patient with Hx of breast cancer, concern for recurrence    Acute Metabolic Encephalopathy due to dementia, hypoxia and sepsis    Hypoalbuminemia    Mild Hyponatremia      PLAN:  -  admit to tele, hospitalist service, and inpatient  -  serial cardiac enzymes, r/o MI  -  cardio consult - Dr. Bautista  -  unable to diurese at this time given the hypotension, if BP stays stable, will start IV Lasix  -  IV Rocephin and IV Zithromax  -  OOB to chair  -  Aspiration and Fall precautions  -  Strict Is/Os  -  check daily weights  -  convert Levemir to Lantus  -  check sugars qAC/HS, Admelog SSI  -  DVT prophylaxis - venodynes and Lovenox  -  follow up blood cultures  -  Tylenol PRN  -  Nasal cannula O2  -  check labs in am  -  GOC / Advance Directives - spoke with patient's daughter Soumya who is acting as the HCP as patient lacks decisional capacity.  Her number is [phone].  She is aware of the new left breast mass and at this time is likely not interested in working it up and will discuss with her brother Finn.  Also, she made it clear, that patient's wishes were to be DNR/DNI, and therefore I completed a MOLST form on this admission and placed in the chart    d/w patient, ED RN and daughter Soumya

## 2023-11-06 NOTE — ED ADULT NURSE NOTE - NSFALLHARMRISKINTERV_ED_ALL_ED
Assistance OOB with selected safe patient handling equipment if applicable/Assistance with ambulation/Communicate risk of Fall with Harm to all staff, patient, and family/Monitor gait and stability/Provide visual cue: red socks, yellow wristband, yellow gown, etc/Reinforce activity limits and safety measures with patient and family/Bed in lowest position, wheels locked, appropriate side rails in place/Call bell, personal items and telephone in reach/Instruct patient to call for assistance before getting out of bed/chair/stretcher/Non-slip footwear applied when patient is off stretcher/[place] to call system/Physically safe environment - no spills, clutter or unnecessary equipment/Purposeful Proactive Rounding/Room/bathroom lighting operational, light cord in reach

## 2023-11-06 NOTE — ED PROVIDER NOTE - OBJECTIVE STATEMENT
78-year-old female with history of dementia, prior CVA, diabetes presents for evaluation shortness of breath with hypoxia and tactile fever.  The patient is noted to be febrile in triage.  The patient was placed on nasal cannula by EMS due to hypoxia.  At baseline, the patient is alert and oriented to self only and is unable to provide any further HPI or review of systems.  The patient's daughter notes that she has been nonambulatory for several years.  The patient's aide told the daughter that she seemed to be cyanotic.

## 2023-11-06 NOTE — ED ADULT NURSE REASSESSMENT NOTE - NS ED NURSE REASSESS COMMENT FT1
Patients svetlana Ribeiro is [phone]
Pt noted to be unkempt, came in soiled diapers. Stage 2 pressure ulcer noted on sacrum. B/L great toe gangrene noted by nail beds. Pt on tele monitor 106 bpm. Provided skin care. 20G IV placed in R FA, 20G IV placed in L FA. Pt on 4 L N.C. satting at 98%. Safety measures maintained, stretcher locked and in lowest position, both side rails up.
Pt resting comfortably. Pt denies any pain or discomfort. VS as charted, respirations equal and unlabored. Pt updated on plan of care, verbalized understanding. Stretcher locked and in lowest position, both side rails up.

## 2023-11-06 NOTE — CONSULT NOTE ADULT - SUBJECTIVE AND OBJECTIVE BOX
HPI: Pt is a 78y old Female with hx of Dementia has been bedbound for about 2 years since getting COVID in . Hx obtained from speaking to the patient's daughter as patient is unable to provide any significant history.  She lives at home with her daughter and she has an aide.  She has not left the house in 2 years.  She was brought to the ED because of shortness of breath, cough and she had one episode of vomiting.  No reported abdominal pain.  Palliative medicine consulted to help establish GOC and advance care planning     2023 pt     PAIN: ( )Yes   (x )No  pt appears comfortable   DYSPNEA: ( ) Yes  (x ) No      PAST MEDICAL & SURGICAL HISTORY:  DM (diabetes mellitus) type 2, not on meds  Breast cancer  Cerebrovascular accident (CVA)  Dementia  LBBB (left bundle branch block)  Carotid stent occlusion    SOCIAL HX:      Hx opiate tolerance ( )YES  (x )NO    Baseline ADLs  (Prior to Admission)  ( ) Independent   (x )Dependent    FAMILY HISTORY:  No pertinent family history of cancer    Review of Systems:    Unable to obtain/Limited due to: Dementia       PHYSICAL EXAM:    Vital Signs Last 24 Hrs  T(C): 36.5 (2023 09:46), Max: 39 (2023 00:03)  T(F): 97.7 (2023 09:46), Max: 102.2 (2023 00:03)  HR: 76 (2023 09:46) (76 - 123)  BP: 97/67 (2023 09:46) (84/59 - 139/117)  BP(mean): 87 (2023 08:59) (65 - 93)  RR: 20 (2023 09:46) (18 - 25)  SpO2: 99% (2023 09:46) (86% - 100%)    Parameters below as of 2023 09:46  Patient On (Oxygen Delivery Method): nasal cannula  O2 Flow (L/min): 2    Daily Weight in k.4 (2023 08:59)    PPSV2: 30  %  FAST: unsure of baseline     General: elderly female in bed, NAD   Mental Status: alert to self and place but unable to tell why she came to the hospital   HEENT: dry oral mucosa   Lungs: diminished breath sounds b/l   Cardiac: s1ws2 +   GI: nontender, nondistended, +BS   : +Voiding   Ext: DEY   Neuro: dementia       LABS:                        13.9   14.46 )-----------( 333      ( 2023 00:15 )             39.9     11-    132<L>  |  103  |  14  ----------------------------<  241<H>  4.6   |  25  |  0.62    Ca    8.3<L>      2023 09:05    TPro  6.8  /  Alb  2.7<L>  /  TBili  0.5  /  DBili  x   /  AST  16  /  ALT  11<L>  /  AlkPhos  67  11-06    PT/INR - ( 2023 00:15 )   PT: 13.1 sec;   INR: 1.16 ratio         PTT - ( 2023 00:15 )  PTT:32.4 sec  Albumin: Albumin: 2.7 g/dL ( @ 00:15)      Allergies    No Known Allergies    Intolerances      MEDICATIONS  (STANDING):  aspirin  chewable 81 milliGRAM(s) Oral daily  atorvastatin 40 milliGRAM(s) Oral at bedtime  azithromycin  IVPB 500 milliGRAM(s) IV Intermittent every 24 hours  cefTRIAXone Injectable. 1000 milliGRAM(s) IV Push every 24 hours  clopidogrel Tablet 300 milliGRAM(s) Oral once  dextrose 5%. 1000 milliLiter(s) (100 mL/Hr) IV Continuous <Continuous>  dextrose 5%. 1000 milliLiter(s) (50 mL/Hr) IV Continuous <Continuous>  dextrose 50% Injectable 25 Gram(s) IV Push once  dextrose 50% Injectable 12.5 Gram(s) IV Push once  dextrose 50% Injectable 25 Gram(s) IV Push once  enoxaparin Injectable 80 milliGRAM(s) SubCutaneous every 12 hours  glucagon  Injectable 1 milliGRAM(s) IntraMuscular once  influenza  Vaccine (HIGH DOSE) 0.7 milliLiter(s) IntraMuscular once  insulin glargine Injectable (LANTUS) 10 Unit(s) SubCutaneous at bedtime  insulin lispro (ADMELOG) corrective regimen sliding scale   SubCutaneous three times a day before meals  insulin lispro (ADMELOG) corrective regimen sliding scale   SubCutaneous at bedtime  QUEtiapine 50 milliGRAM(s) Oral at bedtime    MEDICATIONS  (PRN):  acetaminophen     Tablet .. 650 milliGRAM(s) Oral every 6 hours PRN Mild Pain (1 - 3)  dextrose Oral Gel 15 Gram(s) Oral once PRN Blood Glucose LESS THAN 70 milliGRAM(s)/deciliter  ondansetron Injectable 4 milliGRAM(s) IV Push every 6 hours PRN Nausea and/or Vomiting      RADIOLOGY/ADDITIONAL STUDIES:    ACC: [id number] EXAM:  CT ABDOMEN AND PELVIS IC   ORDERED BY: IWONA BRAXTON   ACC: [id number] EXAM:  CT ANGIO CHEST PULM ART WAWIC   ORDERED BY: IWONA BRAXTON   PROCEDURE DATE:  2023    INTERPRETATION:  CLINICAL INFORMATION: Hypoxia, shortness of breath, sepsis  COMPARISON: CT abdomen pelvis 2021 and prior studies  CONTRAST/COMPLICATIONS:  IV Contrast: Omnipaque 350 (accession [id number]), IV contrast documented   in unlinked concurrent exam (accession [id number])  90 cc administered   10   cc discarded  Oral Contrast: NONE  Complications: None reported at time of study completion  PROCEDURE:  CT Angiography of the Chest was performed followed by portal venous phase   imaging of the Abdomen and Pelvis.  Sagittal and coronal reformats were performed as well as 3D (MIP)   reconstructions.  FINDINGS:  CHEST:  LUNGS AND LARGE AIRWAYS: Patent central airways. Diffuse upper lobe   predominant patchy airspace opacities with septal thickening  PLEURA: Small to moderate bilateral pleural effusions  VESSELS: No pulmonary embolism. Atherosclerotic changes in the aorta and   coronary arteries.  HEART: Heart size is normal. No pericardial effusion. Aortic valve   calcifications. Mitral annular calcifications.  MEDIASTINUM AND CRISTOBAL: No lymphadenopathy.  CHEST WALL AND LOWER NECK: 3.5 x 2.5 cm heterogeneously enhancing left   breast mass, new compared to the most recent prior imaging from .   Asymmetrically prominent left axillary lymph node measuring 1.2 cm short   axis. Dystrophic calcifications in the right breast.  ABDOMEN AND PELVIS:  LIVER: Within normal limits.  BILE DUCTS: Normal caliber.  GALLBLADDER: Cholelithiasis.  SPLEEN: Within normal limits.  PANCREAS: Diffuse fatty atrophy.  ADRENALS: Within normal limits.  KIDNEYS/URETERS: Within normal limits.  BLADDER: Within normal limits.  REPRODUCTIVE ORGANS: Uterus and adnexa within normal limits.  BOWEL: No bowel obstruction. Appendix is normal.  PERITONEUM: No ascites.  VESSELS: Atherosclerotic changes.  RETROPERITONEUM/LYMPH NODES: No lymphadenopathy.  ABDOMINAL WALL: Fat-containing inguinal hernia.  BONES: No acute osseous abnormality.    IMPRESSION:  No pulmonary embolism.  Diffuse upper lobe predominant patchy airspace opacities with septal   thickening, likely representing multifocal infection in the given   clinical context, superimposed edema is not excluded.  Small-to-moderate bilateral pleural effusions.  3.5 cm heterogeneously enhancing left breast mass with asymmetrically  prominent left axillary lymph node. These findings are highly suspicious   for malignancy with abscess as an alternative though less likely   possibility. Dedicated breast imaging is recommended for further   characterization.      ACC: [id number] EXAM:  XR CHEST PORTABLE IMMED 1V     ORDERED BY: IWONA BRAXTON PROCEDURE DATE:  2023    INTERPRETATION:  AP chest on 2023 at 12:32 AM. Patient has sepsis.  Heart magnified by technique.  On 2021 there was a consolidated infiltrate in the right   upper lobe and left perihilar infiltrate.  I present examination there is diffuse mild to moderate infiltration in   the left lung and scattered right perihilar infiltration.    IMPRESSION: Bilateral infiltrates left greater than right presently seen.       HPI: Pt is a 78y old Female with hx of Dementia has been bedbound for about 2 years since getting COVID in . Hx obtained from speaking to the patient's daughter as patient is unable to provide any significant history.  She lives at home with her daughter and she has an aide.  She has not left the house in 2 years.  She was brought to the ED because of shortness of breath, cough and she had one episode of vomiting.  No reported abdominal pain.  Palliative medicine consulted to help establish GOC and advance care planning     2023 pt seen and examined with 2 daughters at bedside, patient alert ot self and place but unable to describe medical history. Patient appear comfortable at this time.      PAIN: ( )Yes   (x )No  pt appears comfortable   DYSPNEA: ( ) Yes  (x ) No      PAST MEDICAL & SURGICAL HISTORY:  DM (diabetes mellitus) type 2, not on meds  Breast cancer  Cerebrovascular accident (CVA)  Dementia  LBBB (left bundle branch block)  Carotid stent occlusion    SOCIAL HX:      Hx opiate tolerance ( )YES  (x )NO    Baseline ADLs  (Prior to Admission)  ( ) Independent   (x )Dependent    FAMILY HISTORY:  No pertinent family history of cancer    Review of Systems:    Unable to obtain/Limited due to: Dementia       PHYSICAL EXAM:    Vital Signs Last 24 Hrs  T(C): 36.5 (2023 09:46), Max: 39 (2023 00:03)  T(F): 97.7 (2023 09:46), Max: 102.2 (2023 00:03)  HR: 76 (2023 09:46) (76 - 123)  BP: 97/67 (2023 09:46) (84/59 - 139/117)  BP(mean): 87 (2023 08:59) (65 - 93)  RR: 20 (2023 09:46) (18 - 25)  SpO2: 99% (2023 09:46) (86% - 100%)    Parameters below as of 2023 09:46  Patient On (Oxygen Delivery Method): nasal cannula  O2 Flow (L/min): 2    Daily Weight in k.4 (2023 08:59)    PPSV2: 30  %  FAST: unsure of baseline     General: elderly female in bed, NAD   Mental Status: alert to self and place but unable to tell why she came to the hospital   HEENT: dry oral mucosa   Lungs: diminished breath sounds b/l   Cardiac: s1ws2 +   GI: nontender, nondistended, +BS   : +Voiding   Ext: DEY   Neuro: dementia       LABS:                        13.9   14.46 )-----------( 333      ( 2023 00:15 )             39.9     11    132<L>  |  103  |  14  ----------------------------<  241<H>  4.6   |  25  |  0.62    Ca    8.3<L>      2023 09:05    TPro  6.8  /  Alb  2.7<L>  /  TBili  0.5  /  DBili  x   /  AST  16  /  ALT  11<L>  /  AlkPhos  67  11-06    PT/INR - ( 2023 00:15 )   PT: 13.1 sec;   INR: 1.16 ratio         PTT - ( 2023 00:15 )  PTT:32.4 sec  Albumin: Albumin: 2.7 g/dL ( @ 00:15)      Allergies    No Known Allergies    Intolerances      MEDICATIONS  (STANDING):  aspirin  chewable 81 milliGRAM(s) Oral daily  atorvastatin 40 milliGRAM(s) Oral at bedtime  azithromycin  IVPB 500 milliGRAM(s) IV Intermittent every 24 hours  cefTRIAXone Injectable. 1000 milliGRAM(s) IV Push every 24 hours  clopidogrel Tablet 300 milliGRAM(s) Oral once  dextrose 5%. 1000 milliLiter(s) (100 mL/Hr) IV Continuous <Continuous>  dextrose 5%. 1000 milliLiter(s) (50 mL/Hr) IV Continuous <Continuous>  dextrose 50% Injectable 25 Gram(s) IV Push once  dextrose 50% Injectable 12.5 Gram(s) IV Push once  dextrose 50% Injectable 25 Gram(s) IV Push once  enoxaparin Injectable 80 milliGRAM(s) SubCutaneous every 12 hours  glucagon  Injectable 1 milliGRAM(s) IntraMuscular once  influenza  Vaccine (HIGH DOSE) 0.7 milliLiter(s) IntraMuscular once  insulin glargine Injectable (LANTUS) 10 Unit(s) SubCutaneous at bedtime  insulin lispro (ADMELOG) corrective regimen sliding scale   SubCutaneous three times a day before meals  insulin lispro (ADMELOG) corrective regimen sliding scale   SubCutaneous at bedtime  QUEtiapine 50 milliGRAM(s) Oral at bedtime    MEDICATIONS  (PRN):  acetaminophen     Tablet .. 650 milliGRAM(s) Oral every 6 hours PRN Mild Pain (1 - 3)  dextrose Oral Gel 15 Gram(s) Oral once PRN Blood Glucose LESS THAN 70 milliGRAM(s)/deciliter  ondansetron Injectable 4 milliGRAM(s) IV Push every 6 hours PRN Nausea and/or Vomiting      RADIOLOGY/ADDITIONAL STUDIES:    ACC: [id number] EXAM:  CT ABDOMEN AND PELVIS IC   ORDERED BY: IWONA BRAXTON   ACC: [id number] EXAM:  CT ANGIO CHEST PULM ART WAWIC   ORDERED BY: IWONA BRAXTON   PROCEDURE DATE:  2023    INTERPRETATION:  CLINICAL INFORMATION: Hypoxia, shortness of breath, sepsis  COMPARISON: CT abdomen pelvis 2021 and prior studies  CONTRAST/COMPLICATIONS:  IV Contrast: Omnipaque 350 (accession [id number]), IV contrast documented   in unlinked concurrent exam (accession [id number])  90 cc administered   10   cc discarded  Oral Contrast: NONE  Complications: None reported at time of study completion  PROCEDURE:  CT Angiography of the Chest was performed followed by portal venous phase   imaging of the Abdomen and Pelvis.  Sagittal and coronal reformats were performed as well as 3D (MIP)   reconstructions.  FINDINGS:  CHEST:  LUNGS AND LARGE AIRWAYS: Patent central airways. Diffuse upper lobe   predominant patchy airspace opacities with septal thickening  PLEURA: Small to moderate bilateral pleural effusions  VESSELS: No pulmonary embolism. Atherosclerotic changes in the aorta and   coronary arteries.  HEART: Heart size is normal. No pericardial effusion. Aortic valve   calcifications. Mitral annular calcifications.  MEDIASTINUM AND CRISTOBAL: No lymphadenopathy.  CHEST WALL AND LOWER NECK: 3.5 x 2.5 cm heterogeneously enhancing left   breast mass, new compared to the most recent prior imaging from .   Asymmetrically prominent left axillary lymph node measuring 1.2 cm short   axis. Dystrophic calcifications in the right breast.  ABDOMEN AND PELVIS:  LIVER: Within normal limits.  BILE DUCTS: Normal caliber.  GALLBLADDER: Cholelithiasis.  SPLEEN: Within normal limits.  PANCREAS: Diffuse fatty atrophy.  ADRENALS: Within normal limits.  KIDNEYS/URETERS: Within normal limits.  BLADDER: Within normal limits.  REPRODUCTIVE ORGANS: Uterus and adnexa within normal limits.  BOWEL: No bowel obstruction. Appendix is normal.  PERITONEUM: No ascites.  VESSELS: Atherosclerotic changes.  RETROPERITONEUM/LYMPH NODES: No lymphadenopathy.  ABDOMINAL WALL: Fat-containing inguinal hernia.  BONES: No acute osseous abnormality.    IMPRESSION:  No pulmonary embolism.  Diffuse upper lobe predominant patchy airspace opacities with septal   thickening, likely representing multifocal infection in the given   clinical context, superimposed edema is not excluded.  Small-to-moderate bilateral pleural effusions.  3.5 cm heterogeneously enhancing left breast mass with asymmetrically  prominent left axillary lymph node. These findings are highly suspicious   for malignancy with abscess as an alternative though less likely   possibility. Dedicated breast imaging is recommended for further   characterization.      ACC: [id number] EXAM:  XR CHEST PORTABLE IMMED 1V     ORDERED BY: IWONA BRAXTON PROCEDURE DATE:  2023    INTERPRETATION:  AP chest on 2023 at 12:32 AM. Patient has sepsis.  Heart magnified by technique.  On 2021 there was a consolidated infiltrate in the right   upper lobe and left perihilar infiltrate.  I present examination there is diffuse mild to moderate infiltration in   the left lung and scattered right perihilar infiltration.    IMPRESSION: Bilateral infiltrates left greater than right presently seen.

## 2023-11-06 NOTE — H&P ADULT - NSHPLABSRESULTS_GEN_ALL_CORE
13.9   14.46 )-----------( 333      ( 2023 00:15 )             39.9         131<L>  |  99  |  15  ----------------------------<  221<H>  4.3   |  25  |  0.62    Ca    8.6      2023 00:15    TPro  6.8  /  Alb  2.7<L>  /  TBili  0.5  /  DBili  x   /  AST  16  /  ALT  11<L>  /  AlkPhos  67      LIVER FUNCTIONS - ( 2023 00:15 )  Alb: 2.7 g/dL / Pro: 6.8 gm/dL / ALK PHOS: 67 U/L / ALT: 11 U/L / AST: 16 U/L / GGT: x           PT/INR - ( 2023 00:15 )   PT: 13.1 sec;   INR: 1.16 ratio       PTT - ( 2023 00:15 )  PTT:32.4 sec  Urinalysis Basic - ( 2023 03:02 )    Color: Yellow / Appearance: Clear / S.018 / pH: x  Gluc: x / Ketone: Negative mg/dL  / Bili: Negative / Urobili: 0.2 mg/dL   Blood: x / Protein: Negative mg/dL / Nitrite: Negative   Leuk Esterase: Negative / RBC: x / WBC x   Sq Epi: x / Non Sq Epi: x / Bacteria: x    Lactate, Blood: 1.2 mmol/L ( @ 00:15)    Blood, Urine: Negative ( @ 03:02)    Trop:  99    BNP:  7472    EKG:  sinus tachycardia, QTc 505, LBBB, no acute changes compared to prior EKGs    CT Chest:  IMPRESSION:  No pulmonary embolism.    Diffuse upper lobe predominant patchy airspace opacities with septal   thickening, likely representing multifocal infection in the given   clinical context, superimposed edema is not excluded.    Small-to-moderate bilateral pleural effusions.    3.5 cm heterogeneously enhancing left breast mass with asymmetrically  prominent left axillary lymph node. These findings are highly suspicious   for malignancy with abscess as an alternative though less likely   possibility. Dedicated breast imaging is recommended for further   characterization.

## 2023-11-06 NOTE — H&P ADULT - NSHPPHYSICALEXAM_GEN_ALL_CORE
HEENT:  pupils equal and reactive, EOMI, no oropharyngeal lesions, erythema, exudates, oral thrush  NECK:   supple, no carotid bruits, no palpable lymph nodes, no thyromegaly  CV:  +S1, +S2, loud harsh systolic murmur, regular  RESP:   lungs with some rales bilaterally, no wheezing  GI:  abdomen soft, non-tender, non-distended, normal BS, no bruits, no abdominal masses, no palpable masses  MSK:   normal muscle tone, no atrophy, no rigidity, no contractions  EXT:  no clubbing, no cyanosis, no edema, no calf pain, swelling or erythema  VASCULAR:  pulses equal and symmetric in the upper and lower extremities  NEURO:  Awake, alert, oriented to person only, no focal neurological deficits, follows all commands, able to move extremities spontaneously  SKIN:  no ulcers, lesions or rashes

## 2023-11-06 NOTE — CONSULT NOTE ADULT - SUBJECTIVE AND OBJECTIVE BOX
CHIEF COMPLAINT:    HPI:    78 F with Hx of Dementia has been bedbound for about 2 years since getting COVID in 2021.  She is at baseline knows her name and is oriented to person only.  Hx obtained from speaking to the patient's daughter as patient is unable to provide any significant history.  She lives at home with her daughter and she has an aide.  She has not left the house in 2 years.  She was brought to the ED because of shortness of breath, cough and she had one episode of vomiting.  No reported abdominal pain.  No reported fevers, chills, shakes, diarrhea or dysuria.      In the ED, she was noted to be hypoxic and had a fever to 102 and was tachycardic.  Imaging revealed a concern for multifocal PNA.  She was treated with IV Rocephin, IV Zithromax.  She did become hypotensive in the ED with a SBP in the 80s, and was treated with a total of 2.5L of NS.  BP has improved.  Lactate was normal.  Initial trop was 94 and BNP was 7472.    PAST MEDICAL HISTORY:  Advanced Dementia  Diabetes Mellitus on Levemir  Hx of old CVA  Chronic LBBB on EKG  Hx of breast cancer many years ago, s/p surgery  COVID PNA in 2021  ECHO from 2021 - EF 60%, severe AS, moderate MS, mild MR    PAST SURGICAL HISTORY:  s/p breast cancer surgery  s/p carotid stent placement    FAMILY HISTORY:   Unable to determine due to patient's mental status at this time (06 Nov 2023 08:16)    Consulted for CHF exacerbation.  Reviewed history w/ daughter - worsening dypsnea  & cough, no LE edema no PND    Reviewed chart.  Pt w/ h/o very severe (critical AS):  AS peak velocities 5.7 m/sec, mean gradient 72 mmHg.  Pt has dementia, poor historian.  Daughter unaware of this diagnosis nor was she aware  of previous evaluation by Dr. Parada in '20.  He discussed w/ pt's  severe AS & conservative v. TAVR.  Later seen by Dr. Rueda in hospital  Pt requested [facility] cardio as consultants for this admit.      PAST MEDICAL AND SURGICAL HISTORY:  PAST MEDICAL & SURGICAL HISTORY:  DM (diabetes mellitus)  type 2, not on meds      Breast cancer      Cerebrovascular accident (CVA)      Dementia      LBBB (left bundle branch block)      Carotid stent occlusion          ALLERGIES:  Allergies    No Known Allergies    Intolerances        SOCIAL HISTORY:  Social History:  no smoking, no drugs, no ETOH (06 Nov 2023 08:16)      FAMILY  HISTORY:  FAMILY HISTORY:  No pertinent family history  of cancer        MEDICATIONS:  OUTPATIENT:  Home Medications:  Seroquel 50 mg oral tablet: 1 tab(s) orally once a day (at bedtime) (06 Nov 2023 08:03)      INPATIENT:  MEDICATIONS  (STANDING):  aspirin  chewable 81 milliGRAM(s) Oral daily  atorvastatin 40 milliGRAM(s) Oral at bedtime  azithromycin  IVPB 500 milliGRAM(s) IV Intermittent every 24 hours  cefTRIAXone Injectable. 1000 milliGRAM(s) IV Push every 24 hours  dextrose 5%. 1000 milliLiter(s) (100 mL/Hr) IV Continuous <Continuous>  dextrose 5%. 1000 milliLiter(s) (50 mL/Hr) IV Continuous <Continuous>  dextrose 50% Injectable 25 Gram(s) IV Push once  dextrose 50% Injectable 12.5 Gram(s) IV Push once  dextrose 50% Injectable 25 Gram(s) IV Push once  enoxaparin Injectable 80 milliGRAM(s) SubCutaneous every 12 hours  glucagon  Injectable 1 milliGRAM(s) IntraMuscular once  influenza  Vaccine (HIGH DOSE) 0.7 milliLiter(s) IntraMuscular once  insulin glargine Injectable (LANTUS) 10 Unit(s) SubCutaneous at bedtime  insulin lispro (ADMELOG) corrective regimen sliding scale   SubCutaneous three times a day before meals  insulin lispro (ADMELOG) corrective regimen sliding scale   SubCutaneous at bedtime  QUEtiapine 50 milliGRAM(s) Oral at bedtime    MEDICATIONS  (PRN):  acetaminophen     Tablet .. 650 milliGRAM(s) Oral every 6 hours PRN Mild Pain (1 - 3)  dextrose Oral Gel 15 Gram(s) Oral once PRN Blood Glucose LESS THAN 70 milliGRAM(s)/deciliter  ondansetron Injectable 4 milliGRAM(s) IV Push every 6 hours PRN Nausea and/or Vomiting    MEDICATIONS  (PRN):  acetaminophen     Tablet .. 650 milliGRAM(s) Oral every 6 hours PRN Mild Pain (1 - 3)  dextrose Oral Gel 15 Gram(s) Oral once PRN Blood Glucose LESS THAN 70 milliGRAM(s)/deciliter  ondansetron Injectable 4 milliGRAM(s) IV Push every 6 hours PRN Nausea and/or Vomiting      REVIEW OF SYSTEMS:  ===============================  ===============================      Vital Signs Last 24 Hrs  T(C): 36.5 (06 Nov 2023 09:46), Max: 39 (06 Nov 2023 00:03)  T(F): 97.7 (06 Nov 2023 09:46), Max: 102.2 (06 Nov 2023 00:03)  HR: 76 (06 Nov 2023 09:46) (76 - 123)  BP: 97/67 (06 Nov 2023 09:46) (84/59 - 139/117)  BP(mean): 87 (06 Nov 2023 08:59) (65 - 93)  RR: 20 (06 Nov 2023 09:46) (18 - 25)  SpO2: 99% (06 Nov 2023 09:46) (86% - 100%)    Parameters below as of 06 Nov 2023 09:46  Patient On (Oxygen Delivery Method): nasal cannula  O2 Flow (L/min): 2      I&O's Summary    05 Nov 2023 07:01  -  06 Nov 2023 07:00  --------------------------------------------------------  IN: 0 mL / OUT: 200 mL / NET: -200 mL        I&O's Detail    05 Nov 2023 07:01  -  06 Nov 2023 07:00  --------------------------------------------------------  IN:  Total IN: 0 mL    OUT:    Intermittent Catheterization - Urethral (mL): 200 mL  Total OUT: 200 mL    Total NET: -200 mL          PHYSICAL EXAM:    Constitutional: NAD, awake  HEENT: PERR, EOMI,   Neck:  supple,  No JVD  Respiratory:  faint crackles at bases,  No wheezing, rales or rhonchi  Cardiovascular: S1 and S2, regular rate and rhythm, loud systolic Murmurs no radiation to carotids.  Gastrointestinal: Bowel Sounds present, soft, nontender.   Extremities: No peripheral edema. No clubbing or cyanosis.  Vascular: 2+ peripheral pulses  Neurological: A/O x 3, no focal deficits      ===============================  ===============================  LABS:                         13.9   14.46 )-----------( 333      ( 06 Nov 2023 00:15 )             39.9     06 Nov 2023 09:05    132    |  103    |  14     ----------------------------<  241    4.6     |  25     |  0.62   06 Nov 2023 00:15    131    |  99     |  15     ----------------------------<  221    4.3     |  25     |  0.62     Ca    8.3        06 Nov 2023 09:05  Ca    8.6        06 Nov 2023 00:15    TPro  6.8    /  Alb  2.7    /  TBili  0.5    /  DBili  x      /  AST  16     /  ALT  11     /  AlkPhos  67     06 Nov 2023 00:15    PT/INR - ( 06 Nov 2023 00:15 )   PT: 13.1 sec;   INR: 1.16 ratio         PTT - ( 06 Nov 2023 00:15 )  PTT:32.4 sec    THYROID STUDIES:    ===============================  ===============================  CARDIAC BIOMARKERS:  -------  -BNP VALUES:    -------  -TROPONIN VALUES:   Troponin I, High Sensitivity Result: 12, 182.82 ng/L *H* (11-06-23 @ 09:05)  Troponin I, High Sensitivity Result: 93.99 ng/L *H* (11-06-23 @ 00:15)  Troponin I, Serum: <0.015 ng/mL [0.015 - 0.045] (02-05-21 @ 16:55)  Troponin I, Serum: <0.015 ng/mL [0.015 - 0.045] (02-05-21 @ 14:00)  Troponin I, Serum: <0.015 ng/mL [0.015 - 0.045] (01-18-21 @ 10:25)        ===============================  ===============================  EKG: NSR LBBB      ==================================  ==================================    EXAM:  ECHO TTE WO CON COMP W DOPP    PROCEDURE DATE:  06/30/2021    INTERPRETATION:  Transhoracic Echocardiography Report (TTE)     Demographics     Patient name         MARIFER BARRAGAN       Age           76 year(s)     Med Rec #  [id number]          Gender        Female     Account #            [id number]       Date of Birth 1945     Interpreting         Valdo Brown MD   Room Number   0532   Physician      Doppler Measurements:     AV Velocity:568 cm/s                  MV Peak E-Wave: 92.1 cm/s   AV Peak Gradient: 129.05 mmHg         MV Peak A-Wave: 178 cm/s   AV Mean Gradient: 72 mmHg             MV E/A Ratio: 0.52 %                                         MV Peak Gradient: 3.39 mmHg     Findings     Mitral Valve   Moderate to severe mitral annular calcification is present.   There is calcification of mitral valve . The leaflet opening is moderately   restricted.   Mean transmitral gradient is 6 mmHg; this finding is consistent with   moderate mitral stenosis.   Mild (1+) mitral regurgitation is present.     Aortic Valve   Significant fibrocalcific changes noted to the aortic valve leaflets with   restriction in leaflet excursion. Peak and mean transaortic gradients are   129 and 72 mmHg respectively; this finding is consistent with severe   aortic stenosis.     Impression     Summary     Concentric left ventricular hypertrophy is present.   Estimated left ventricular ejection fraction is 60 %.   Normal appearing left atrium.  Normal appearing right atrium.   Normal appearing right ventricle structure and function.   ***************Significant fibrocalcific changes noted to the aortic valve leaflets with   *************** restriction in leaflet excursion. Peak and mean transaortic gradients are   *************** 129 and 72 mmHg respectively; this finding is consistent with severe   ***************aortic stenosis.   Moderate to severe mitral annular calcification is present.   There is calcification of mitral valve . The leaflet opening is moderately   restricted.    ***************Mean transmitral gradient is 6 mmHg; this finding is consistent with    ***************moderate mitral stenosis.   Mild (1+) mitral regurgitation is present.   The tricuspid valve is not well visualized, but is probably normal.   No evidence of pericardial effusion.     Signature     ----------------------------------------------------------------   Electronically signed by Valdo Brown MD(Interpreting   physician) on 06/30/2021 02:57 PM   ----------------------------------------------------------------  VALDO BROWN MD; Attending Cardiologist  This document has been electronically signed. Jun 30 2021  2:57PM  =======================   CHIEF COMPLAINT:    HPI:    78 F with Hx of Dementia has been bedbound for about 2 years since getting COVID in 2021.  She is at baseline knows her name and is oriented to person only.  Hx obtained from speaking to the patient's daughter as patient is unable to provide any significant history.  She lives at home with her daughter and she has an aide.  She has not left the house in 2 years.  She was brought to the ED because of shortness of breath, cough and she had one episode of vomiting.  No reported abdominal pain.  No reported fevers, chills, shakes, diarrhea or dysuria.      In the ED, she was noted to be hypoxic and had a fever to 102 and was tachycardic.  Imaging revealed a concern for multifocal PNA.  She was treated with IV Rocephin, IV Zithromax.  She did become hypotensive in the ED with a SBP in the 80s, and was treated with a total of 2.5L of NS.  BP has improved.  Lactate was normal.  Initial trop was 94 and BNP was 7472.    PAST MEDICAL HISTORY:  Advanced Dementia  Diabetes Mellitus on Levemir  Hx of old CVA  Chronic LBBB on EKG  Hx of breast cancer many years ago, s/p surgery  COVID PNA in 2021  ECHO from 2021 - EF 60%, severe AS, moderate MS, mild MR    PAST SURGICAL HISTORY:  s/p breast cancer surgery  s/p carotid stent placement    FAMILY HISTORY:   Unable to determine due to patient's mental status at this time (06 Nov 2023 08:16)    Consulted for CHF exacerbation.  Reviewed history w/ daughter - worsening dypsnea  & cough, no LE edema no PND    Reviewed chart.  Pt w/ h/o very severe (critical AS):  AS peak velocities 5.7 m/sec, mean gradient 72 mmHg.  Pt has dementia, poor historian.  Daughter unaware of this diagnosis nor was she aware  of previous evaluation by Dr. Parada in '20.  He discussed w/ pt's  severe AS & conservative v. TAVR.  Later seen by Dr. Rueda in hospital  Pt requested [facility] cardio as consultants for this admit.      PAST MEDICAL AND SURGICAL HISTORY:  PAST MEDICAL & SURGICAL HISTORY:  DM (diabetes mellitus)  type 2, not on meds      Breast cancer      Cerebrovascular accident (CVA)      Dementia      LBBB (left bundle branch block)      Carotid stent occlusion          ALLERGIES:  Allergies    No Known Allergies    Intolerances        SOCIAL HISTORY:  Social History:  no smoking, no drugs, no ETOH (06 Nov 2023 08:16)      FAMILY  HISTORY:  FAMILY HISTORY:  No pertinent family history  of cancer        MEDICATIONS:  OUTPATIENT:  Home Medications:  Seroquel 50 mg oral tablet: 1 tab(s) orally once a day (at bedtime) (06 Nov 2023 08:03)      INPATIENT:  MEDICATIONS  (STANDING):  aspirin  chewable 81 milliGRAM(s) Oral daily  atorvastatin 40 milliGRAM(s) Oral at bedtime  azithromycin  IVPB 500 milliGRAM(s) IV Intermittent every 24 hours  cefTRIAXone Injectable. 1000 milliGRAM(s) IV Push every 24 hours  dextrose 5%. 1000 milliLiter(s) (100 mL/Hr) IV Continuous <Continuous>  dextrose 5%. 1000 milliLiter(s) (50 mL/Hr) IV Continuous <Continuous>  dextrose 50% Injectable 25 Gram(s) IV Push once  dextrose 50% Injectable 12.5 Gram(s) IV Push once  dextrose 50% Injectable 25 Gram(s) IV Push once  enoxaparin Injectable 80 milliGRAM(s) SubCutaneous every 12 hours  glucagon  Injectable 1 milliGRAM(s) IntraMuscular once  influenza  Vaccine (HIGH DOSE) 0.7 milliLiter(s) IntraMuscular once  insulin glargine Injectable (LANTUS) 10 Unit(s) SubCutaneous at bedtime  insulin lispro (ADMELOG) corrective regimen sliding scale   SubCutaneous three times a day before meals  insulin lispro (ADMELOG) corrective regimen sliding scale   SubCutaneous at bedtime  QUEtiapine 50 milliGRAM(s) Oral at bedtime    MEDICATIONS  (PRN):  acetaminophen     Tablet .. 650 milliGRAM(s) Oral every 6 hours PRN Mild Pain (1 - 3)  dextrose Oral Gel 15 Gram(s) Oral once PRN Blood Glucose LESS THAN 70 milliGRAM(s)/deciliter  ondansetron Injectable 4 milliGRAM(s) IV Push every 6 hours PRN Nausea and/or Vomiting    MEDICATIONS  (PRN):  acetaminophen     Tablet .. 650 milliGRAM(s) Oral every 6 hours PRN Mild Pain (1 - 3)  dextrose Oral Gel 15 Gram(s) Oral once PRN Blood Glucose LESS THAN 70 milliGRAM(s)/deciliter  ondansetron Injectable 4 milliGRAM(s) IV Push every 6 hours PRN Nausea and/or Vomiting      REVIEW OF SYSTEMS:  ===============================  ===============================      Vital Signs Last 24 Hrs  T(C): 36.5 (06 Nov 2023 09:46), Max: 39 (06 Nov 2023 00:03)  T(F): 97.7 (06 Nov 2023 09:46), Max: 102.2 (06 Nov 2023 00:03)  HR: 76 (06 Nov 2023 09:46) (76 - 123)  BP: 97/67 (06 Nov 2023 09:46) (84/59 - 139/117)  BP(mean): 87 (06 Nov 2023 08:59) (65 - 93)  RR: 20 (06 Nov 2023 09:46) (18 - 25)  SpO2: 99% (06 Nov 2023 09:46) (86% - 100%)    Parameters below as of 06 Nov 2023 09:46  Patient On (Oxygen Delivery Method): nasal cannula  O2 Flow (L/min): 2      I&O's Summary    05 Nov 2023 07:01  -  06 Nov 2023 07:00  --------------------------------------------------------  IN: 0 mL / OUT: 200 mL / NET: -200 mL        I&O's Detail    05 Nov 2023 07:01  -  06 Nov 2023 07:00  --------------------------------------------------------  IN:  Total IN: 0 mL    OUT:    Intermittent Catheterization - Urethral (mL): 200 mL  Total OUT: 200 mL    Total NET: -200 mL          PHYSICAL EXAM:    Constitutional: NAD, awake  HEENT: PERR, EOMI,   Neck:  supple,  No JVD  Respiratory:  faint crackles at bases,  No wheezing, rales or rhonchi  Cardiovascular: S1 and S2, regular rate and rhythm, loud systolic Murmurs no radiation to carotids.  Gastrointestinal: Bowel Sounds present, soft, nontender.   Extremities: No peripheral edema. No clubbing or cyanosis.  Vascular: 2+ peripheral pulses  Neurological: A/O x 3, no focal deficits      ===============================  ===============================  LABS:                         13.9   14.46 )-----------( 333      ( 06 Nov 2023 00:15 )             39.9     06 Nov 2023 09:05    132    |  103    |  14     ----------------------------<  241    4.6     |  25     |  0.62   06 Nov 2023 00:15    131    |  99     |  15     ----------------------------<  221    4.3     |  25     |  0.62     Ca    8.3        06 Nov 2023 09:05  Ca    8.6        06 Nov 2023 00:15    TPro  6.8    /  Alb  2.7    /  TBili  0.5    /  DBili  x      /  AST  16     /  ALT  11     /  AlkPhos  67     06 Nov 2023 00:15    PT/INR - ( 06 Nov 2023 00:15 )   PT: 13.1 sec;   INR: 1.16 ratio         PTT - ( 06 Nov 2023 00:15 )  PTT:32.4 sec    THYROID STUDIES:    ===============================  ===============================  CARDIAC BIOMARKERS:  -------  -BNP VALUES:    -------  -TROPONIN VALUES:   Troponin I, High Sensitivity Result: 12,182.82 ng/L *H* (11-06-23 @ 09:05)    Troponin I, High Sensitivity Result: 93.99 ng/L *H* (11-06-23 @ 00:15)    Troponin I, Serum: <0.015 ng/mL [0.015 - 0.045] (02-05-21 @ 16:55)  Troponin I, Serum: <0.015 ng/mL [0.015 - 0.045] (02-05-21 @ 14:00)  Troponin I, Serum: <0.015 ng/mL [0.015 - 0.045] (01-18-21 @ 10:25)        ===============================  ===============================  EKG: NSR LBBB      ==================================  ==================================    EXAM:  ECHO TTE WO CON COMP W DOPP    PROCEDURE DATE:  06/30/2021    INTERPRETATION:  Transhoracic Echocardiography Report (TTE)     Demographics     Patient name         MARIFER BARRAGAN       Age           76 year(s)     Med Rec #  [id number]          Gender        Female     Account #            [id number]       Date of Birth 1945     Interpreting         Valdo Brown MD   Room Number   0532   Physician      Doppler Measurements:     AV Velocity:568 cm/s                  MV Peak E-Wave: 92.1 cm/s   AV Peak Gradient: 129.05 mmHg         MV Peak A-Wave: 178 cm/s   AV Mean Gradient: 72 mmHg             MV E/A Ratio: 0.52 %                                         MV Peak Gradient: 3.39 mmHg     Findings     Mitral Valve   Moderate to severe mitral annular calcification is present.   There is calcification of mitral valve . The leaflet opening is moderately   restricted.   Mean transmitral gradient is 6 mmHg; this finding is consistent with   moderate mitral stenosis.   Mild (1+) mitral regurgitation is present.     Aortic Valve   Significant fibrocalcific changes noted to the aortic valve leaflets with   restriction in leaflet excursion. Peak and mean transaortic gradients are   129 and 72 mmHg respectively; this finding is consistent with severe   aortic stenosis.     Impression     Summary     Concentric left ventricular hypertrophy is present.   Estimated left ventricular ejection fraction is 60 %.   Normal appearing left atrium.  Normal appearing right atrium.   Normal appearing right ventricle structure and function.   ***************Significant fibrocalcific changes noted to the aortic valve leaflets with   *************** restriction in leaflet excursion. Peak and mean transaortic gradients are   *************** 129 and 72 mmHg respectively; this finding is consistent with severe   ***************aortic stenosis.   Moderate to severe mitral annular calcification is present.   There is calcification of mitral valve . The leaflet opening is moderately   restricted.    ***************Mean transmitral gradient is 6 mmHg; this finding is consistent with    ***************moderate mitral stenosis.   Mild (1+) mitral regurgitation is present.   The tricuspid valve is not well visualized, but is probably normal.   No evidence of pericardial effusion.     Signature     ----------------------------------------------------------------   Electronically signed by Valdo Brown MD(Interpreting   physician) on 06/30/2021 02:57 PM   ----------------------------------------------------------------  VALDO BROWN MD; Attending Cardiologist  This document has been electronically signed. Jun 30 2021  2:57PM  =======================  < from: CT Angio Chest PE Protocol w/ IV Cont (11.06.23 @ 01:19) >  ACC: [id number] EXAM:  CT ABDOMEN AND PELVIS IC   ORDERED BY: IWONA BRAXTON     ACC: [id number] EXAM:  CT ANGIO CHEST PULM ART WAWIC   ORDERED BY: IWONA BRAXTON     PROCEDURE DATE:  11/06/2023          INTERPRETATION:  CLINICAL INFORMATION: Hypoxia, shortness of breath,   sepsis    COMPARISON: CT abdomen pelvis 9/5/2021 and prior studies    CONTRAST/COMPLICATIONS:  IV Contrast: Omnipaque 350 (accession [id number]), IV contrast documented   in unlinked concurrent exam (accession [id number])  90 cc administered   10   cc discarded  Oral Contrast: NONE  Complications: None reported at time of study completion    PROCEDURE:  CT Angiography of the Chest was performed followed by portal venous phase   imaging of the Abdomen and Pelvis.  Sagittal and coronal reformats were performed as well as 3D (MIP)   reconstructions.    FINDINGS:  CHEST:  LUNGS AND LARGE AIRWAYS: Patent central airways. Diffuse upper lobe   predominant patchy airspace opacities with septal thickening  PLEURA: Small to moderate bilateral pleural effusions  VESSELS: No pulmonary embolism. Atherosclerotic changes in the aorta and   coronary arteries.  HEART: Heart size is normal. No pericardial effusion. Aortic valve   calcifications. Mitral annular calcifications.  MEDIASTINUM AND CRISTOBAL: No lymphadenopathy.  CHEST WALL AND LOWER NECK: 3.5 x 2.5 cm heterogeneously enhancing left   breast mass, new compared to the most recent prior imaging from 2021.   Asymmetrically prominent left axillary lymph node measuring 1.2 cm short   axis. Dystrophic calcifications in the right breast.    ABDOMEN AND PELVIS:  LIVER: Within normal limits.  BILE DUCTS: Normal caliber.  GALLBLADDER: Cholelithiasis.  SPLEEN: Within normal limits.  PANCREAS: Diffuse fatty atrophy.  ADRENALS: Within normal limits.  KIDNEYS/URETERS: Within normal limits.    BLADDER: Within normal limits.  REPRODUCTIVE ORGANS: Uterus and adnexa within normal limits.    BOWEL: No bowel obstruction. Appendix is normal.  PERITONEUM: No ascites.  VESSELS: Atherosclerotic changes.  RETROPERITONEUM/LYMPH NODES: No lymphadenopathy.  ABDOMINAL WALL: Fat-containing inguinal hernia.  BONES: No acute osseous abnormality.    IMPRESSION:  No pulmonary embolism.    Diffuse upper lobe predominant patchy airspace opacities with septal   thickening, likely representing multifocal infection in the given   clinical context, superimposed edema is not excluded.    Small-to-moderate bilateral pleural effusions.    3.5 cm heterogeneously enhancing left breast mass with asymmetrically  prominent left axillary lymph node. These findings are highly suspicious   for malignancy with abscess as an alternative though less likely   possibility. Dedicated breast imaging is recommended for further   characterization.        --- End of Report ---            RYAN APODACA MD; Attending Radiologist  This document has been electronically signed. Nov 6 2023  2:23AM  =======================  < from: Xray Chest 1 View-PORTABLE IMMEDIATE (11.06.23 @ 00:44) >    ACC: [id number] EXAM:  XR CHEST PORTABLE IMMED 1V   ORDERED BY: IWONA BRAXTON     PROCEDURE DATE:  11/06/2023          INTERPRETATION:  AP chest on November 6, 2023 at 12:32 AM. Patient has   sepsis.    Heart magnified by technique.    On September 5, 2021 there was a consolidated infiltrate in the right   upper lobe and left perihilar infiltrate.    I present examination there is diffuse mild to moderate infiltration in   the left lung and scattered right perihilar infiltration.    IMPRESSION: Bilateral infiltrates left greater than right presently seen.    --- End of Report ---            CHUY CANSECO MD; Attending Radiologist  This document has been electronically signed. Nov 6 2023  8:50AM    < end of copied text >

## 2023-11-06 NOTE — H&P ADULT - CONVERSATION DETAILS
Discussed MOLST form with daughter as patient lacks capacity to make decisions, daughter is in agreement with DNR/DNI

## 2023-11-06 NOTE — CONSULT NOTE ADULT - PROBLEM SELECTOR RECOMMENDATION 2
-markedly elevated 2nd trop at 12,000. ECG w/ LBBB unable to interpret ischemia.  -no chest pain    -Discussed w/ family & hospitalist (intintjerrell) - no plans for intervention (i.e. cardiac cath)  given pt's dementia.  -Agree w/ conservative mx w/ ASA, statin, lovenox 1mg/kg SC q12hr, plavix load 300 mg x 1 then 75 daily.    -Palliative med consulted. currently DNR/DNI

## 2023-11-06 NOTE — CONSULT NOTE ADULT - ASSESSMENT
Pt is a 78y old Female with hx of Dementia has been bedbound for about 2 years since getting COVID in . Hx obtained from speaking to the patient's daughter as patient is unable to provide any significant history.  She lives at home with her daughter and she has an aide.  She has not left the house in 2 years.  She was brought to the ED because of shortness of breath, cough and she had one episode of vomiting.  No reported abdominal pain.  Palliative medicine consulted to help establish GOC and advance care planning     1) Dementia   - unsure of baseline   - PT as able to tolerate   - supportive care     2) CHF   - cardiology consult pending    - elevated troponin - patients daughter not interested in cardiac cath or invasive procedures   - c/w  ASA, statin, Lovenox, Plavix    3) Breast Mass   - CT reviewed - highly suspicious for breast mass   - as per chart family has decided to not work this up or further interventions will confirm in GOC meeting    Process of Care  --Reviewed dx/treatment problems and alignment with Goals of Care    Physical Aspects of Care  --Pain  patient appears comfortable at this time     --Bowel Regimen  denies constipation  risk for constipation d/t immobility  daily dulcolax    --Dyspnea  No SOB at this time  comfortable and in NAD    --Nausea Vomiting  denies    --Weakness  PT as tolerated     Psychological and Psychiatric Aspects of Care:   --Greif/Bereavment: emotional support provided  --Hx of psychiatric dx: none  -Pastoral Care Available PRN     Social Aspects of Care  -SW involved     Cultural Aspects  -Primary Language: English    Goals of Care:     We discussed Palliative Care team being a supportive team when a patient has ongoing illnesses.  We also discussed that it is not an end of life care service, but can help navigate symptoms and emotional support througout their hospital stay here.    Ethical and Legal Aspects:   NA    Capacity- pt does not have capacity     HCP/Surrogate: on one content naming patient  who as per daughters i , no updated HCP meaning all four children would be surrogate decisions maker, Patient daughter Jo states that patient lives with Soumya and ok to make decisions Soumya states that patient lives with her and her two brothers are on the same page about no aggressive measures she will speak to them about setting up GOC meeting      Code Status- DNR/I   MOLST- placed on chart   Dispo Plan-    Discussed With:    Case coordinated with attending and SW and RN     Time Spent: 90 minutes including the care, coordination and counseling of this patient, 50% of which was spent coordinating and counseling.

## 2023-11-06 NOTE — CONSULT NOTE ADULT - PROBLEM SELECTOR RECOMMENDATION 9
-CHF w/ volume overload likely due to severe aortic stenosis  -known h/o severe aortic stenosis  -Discussed w/ family & hospitalist (intintjerrell) - no plans for intervention (i.e. TAVR)  given pt's dementia.    -holding on IV lasix given hypotension.  -Cont. strict Is & Os daily wts, fluid restrict 1.5 L/ 24 hrs.  -Also w/ Pna will treat w/ abx as per hospitalist service.

## 2023-11-06 NOTE — ED PROVIDER NOTE - DIFFERENTIAL DIAGNOSIS
sepsis due to urinary tract infection, pneumonia, cellulitis, bacteremia, viral infection Differential Diagnosis

## 2023-11-06 NOTE — ED PROVIDER NOTE - PHYSICAL EXAMINATION
CONSTITUTIONAL: Well appearing,  awake and oriented to self only  · ENMT: Airway patent, Nasal mucosa clear. Mouth with dry mucosa. Throat has no vesicles, no oropharyngeal exudates and uvula is midline.  · EYES: Clear bilaterally, pupils equal, round and reactive to light.  · CARDIAC: tachycardia,, regular rhythm.  Heart sounds S1, S2.  No murmurs, rubs or gallops.  · RESPIRATORY:  diffuse rhonchi  · GASTROINTESTINAL: Abdomen soft, non-tender, no guarding.  · MUSCULOSKELETAL: Spine appears normal, range of motion is not limited, no muscle or joint tenderness  · NEUROLOGICAL: Alert and oriented, no focal deficits, no motor or sensory deficits.  · SKIN: Skin normal color for race, warm, dry and intact.  stage II sacral ulcer

## 2023-11-06 NOTE — ED ADULT NURSE NOTE - OBJECTIVE STATEMENT
77 y/o female presents to ED c/o SOB. A&Ox1, pt is poor historian due to hx of dementia. EMS states that she was hypoxic to 80s, pt currently on 5 L N.C. satting at 96%. Pt's daughter states that 'she threw up and I think she may have aspirated." PMHx diabetes, prior stroke years ago, daughter states pt has partial right sided-weakness since then.

## 2023-11-07 LAB
A1C WITH ESTIMATED AVERAGE GLUCOSE RESULT: 7.4 % — HIGH (ref 4–5.6)
A1C WITH ESTIMATED AVERAGE GLUCOSE RESULT: 7.4 % — HIGH (ref 4–5.6)
ANION GAP SERPL CALC-SCNC: 6 MMOL/L — SIGNIFICANT CHANGE UP (ref 5–17)
ANION GAP SERPL CALC-SCNC: 6 MMOL/L — SIGNIFICANT CHANGE UP (ref 5–17)
BUN SERPL-MCNC: 18 MG/DL — SIGNIFICANT CHANGE UP (ref 7–23)
BUN SERPL-MCNC: 18 MG/DL — SIGNIFICANT CHANGE UP (ref 7–23)
CALCIUM SERPL-MCNC: 8.5 MG/DL — SIGNIFICANT CHANGE UP (ref 8.5–10.1)
CALCIUM SERPL-MCNC: 8.5 MG/DL — SIGNIFICANT CHANGE UP (ref 8.5–10.1)
CHLORIDE SERPL-SCNC: 102 MMOL/L — SIGNIFICANT CHANGE UP (ref 96–108)
CHLORIDE SERPL-SCNC: 102 MMOL/L — SIGNIFICANT CHANGE UP (ref 96–108)
CO2 SERPL-SCNC: 24 MMOL/L — SIGNIFICANT CHANGE UP (ref 22–31)
CO2 SERPL-SCNC: 24 MMOL/L — SIGNIFICANT CHANGE UP (ref 22–31)
CREAT SERPL-MCNC: 0.52 MG/DL — SIGNIFICANT CHANGE UP (ref 0.5–1.3)
CREAT SERPL-MCNC: 0.52 MG/DL — SIGNIFICANT CHANGE UP (ref 0.5–1.3)
CULTURE RESULTS: NO GROWTH — SIGNIFICANT CHANGE UP
CULTURE RESULTS: NO GROWTH — SIGNIFICANT CHANGE UP
EGFR: 95 ML/MIN/1.73M2 — SIGNIFICANT CHANGE UP
EGFR: 95 ML/MIN/1.73M2 — SIGNIFICANT CHANGE UP
ESTIMATED AVERAGE GLUCOSE: 166 MG/DL — HIGH (ref 68–114)
ESTIMATED AVERAGE GLUCOSE: 166 MG/DL — HIGH (ref 68–114)
GLUCOSE BLDC GLUCOMTR-MCNC: 183 MG/DL — HIGH (ref 70–99)
GLUCOSE BLDC GLUCOMTR-MCNC: 183 MG/DL — HIGH (ref 70–99)
GLUCOSE BLDC GLUCOMTR-MCNC: 212 MG/DL — HIGH (ref 70–99)
GLUCOSE BLDC GLUCOMTR-MCNC: 212 MG/DL — HIGH (ref 70–99)
GLUCOSE BLDC GLUCOMTR-MCNC: 221 MG/DL — HIGH (ref 70–99)
GLUCOSE BLDC GLUCOMTR-MCNC: 221 MG/DL — HIGH (ref 70–99)
GLUCOSE BLDC GLUCOMTR-MCNC: 226 MG/DL — HIGH (ref 70–99)
GLUCOSE BLDC GLUCOMTR-MCNC: 226 MG/DL — HIGH (ref 70–99)
GLUCOSE SERPL-MCNC: 233 MG/DL — HIGH (ref 70–99)
GLUCOSE SERPL-MCNC: 233 MG/DL — HIGH (ref 70–99)
HCT VFR BLD CALC: 33.2 % — LOW (ref 34.5–45)
HCT VFR BLD CALC: 33.2 % — LOW (ref 34.5–45)
HGB BLD-MCNC: 11.2 G/DL — LOW (ref 11.5–15.5)
HGB BLD-MCNC: 11.2 G/DL — LOW (ref 11.5–15.5)
MCHC RBC-ENTMCNC: 30.8 PG — SIGNIFICANT CHANGE UP (ref 27–34)
MCHC RBC-ENTMCNC: 30.8 PG — SIGNIFICANT CHANGE UP (ref 27–34)
MCHC RBC-ENTMCNC: 33.7 GM/DL — SIGNIFICANT CHANGE UP (ref 32–36)
MCHC RBC-ENTMCNC: 33.7 GM/DL — SIGNIFICANT CHANGE UP (ref 32–36)
MCV RBC AUTO: 91.2 FL — SIGNIFICANT CHANGE UP (ref 80–100)
MCV RBC AUTO: 91.2 FL — SIGNIFICANT CHANGE UP (ref 80–100)
PLATELET # BLD AUTO: 268 K/UL — SIGNIFICANT CHANGE UP (ref 150–400)
PLATELET # BLD AUTO: 268 K/UL — SIGNIFICANT CHANGE UP (ref 150–400)
POTASSIUM SERPL-MCNC: 4.7 MMOL/L — SIGNIFICANT CHANGE UP (ref 3.5–5.3)
POTASSIUM SERPL-MCNC: 4.7 MMOL/L — SIGNIFICANT CHANGE UP (ref 3.5–5.3)
POTASSIUM SERPL-SCNC: 4.7 MMOL/L — SIGNIFICANT CHANGE UP (ref 3.5–5.3)
POTASSIUM SERPL-SCNC: 4.7 MMOL/L — SIGNIFICANT CHANGE UP (ref 3.5–5.3)
RBC # BLD: 3.64 M/UL — LOW (ref 3.8–5.2)
RBC # BLD: 3.64 M/UL — LOW (ref 3.8–5.2)
RBC # FLD: 12.2 % — SIGNIFICANT CHANGE UP (ref 10.3–14.5)
RBC # FLD: 12.2 % — SIGNIFICANT CHANGE UP (ref 10.3–14.5)
SODIUM SERPL-SCNC: 132 MMOL/L — LOW (ref 135–145)
SODIUM SERPL-SCNC: 132 MMOL/L — LOW (ref 135–145)
SPECIMEN SOURCE: SIGNIFICANT CHANGE UP
SPECIMEN SOURCE: SIGNIFICANT CHANGE UP
TROPONIN I, HIGH SENSITIVITY RESULT: HIGH NG/L
TROPONIN I, HIGH SENSITIVITY RESULT: HIGH NG/L
WBC # BLD: 8.91 K/UL — SIGNIFICANT CHANGE UP (ref 3.8–10.5)
WBC # BLD: 8.91 K/UL — SIGNIFICANT CHANGE UP (ref 3.8–10.5)
WBC # FLD AUTO: 8.91 K/UL — SIGNIFICANT CHANGE UP (ref 3.8–10.5)
WBC # FLD AUTO: 8.91 K/UL — SIGNIFICANT CHANGE UP (ref 3.8–10.5)

## 2023-11-07 PROCEDURE — 99232 SBSQ HOSP IP/OBS MODERATE 35: CPT

## 2023-11-07 PROCEDURE — 99233 SBSQ HOSP IP/OBS HIGH 50: CPT

## 2023-11-07 RX ORDER — THIAMINE MONONITRATE (VIT B1) 100 MG
100 TABLET ORAL DAILY
Refills: 0 | Status: DISCONTINUED | OUTPATIENT
Start: 2023-11-07 | End: 2023-11-13

## 2023-11-07 RX ORDER — QUETIAPINE FUMARATE 200 MG/1
12.5 TABLET, FILM COATED ORAL EVERY 12 HOURS
Refills: 0 | Status: DISCONTINUED | OUTPATIENT
Start: 2023-11-07 | End: 2023-11-13

## 2023-11-07 RX ORDER — QUETIAPINE FUMARATE 200 MG/1
25 TABLET, FILM COATED ORAL AT BEDTIME
Refills: 0 | Status: DISCONTINUED | OUTPATIENT
Start: 2023-11-07 | End: 2023-11-08

## 2023-11-07 RX ADMIN — Medication 2: at 12:15

## 2023-11-07 RX ADMIN — Medication 650 MILLIGRAM(S): at 22:37

## 2023-11-07 RX ADMIN — QUETIAPINE FUMARATE 25 MILLIGRAM(S): 200 TABLET, FILM COATED ORAL at 21:36

## 2023-11-07 RX ADMIN — ENOXAPARIN SODIUM 80 MILLIGRAM(S): 100 INJECTION SUBCUTANEOUS at 10:04

## 2023-11-07 RX ADMIN — INSULIN GLARGINE 10 UNIT(S): 100 INJECTION, SOLUTION SUBCUTANEOUS at 22:00

## 2023-11-07 RX ADMIN — Medication 81 MILLIGRAM(S): at 10:03

## 2023-11-07 RX ADMIN — CLOPIDOGREL BISULFATE 75 MILLIGRAM(S): 75 TABLET, FILM COATED ORAL at 10:03

## 2023-11-07 RX ADMIN — CEFTRIAXONE 1000 MILLIGRAM(S): 500 INJECTION, POWDER, FOR SOLUTION INTRAMUSCULAR; INTRAVENOUS at 21:37

## 2023-11-07 RX ADMIN — Medication 2: at 17:43

## 2023-11-07 RX ADMIN — Medication 650 MILLIGRAM(S): at 21:37

## 2023-11-07 RX ADMIN — ATORVASTATIN CALCIUM 40 MILLIGRAM(S): 80 TABLET, FILM COATED ORAL at 21:36

## 2023-11-07 RX ADMIN — ENOXAPARIN SODIUM 80 MILLIGRAM(S): 100 INJECTION SUBCUTANEOUS at 21:35

## 2023-11-07 RX ADMIN — Medication 1: at 08:46

## 2023-11-07 RX ADMIN — AZITHROMYCIN 255 MILLIGRAM(S): 500 TABLET, FILM COATED ORAL at 21:36

## 2023-11-07 NOTE — DIETITIAN INITIAL EVALUATION ADULT - ADD RECOMMEND
1. C/w consistent carb diet 2/2 elevated POCTs and encourage protein-rich foods, maximize food preferences   2. Add Premier protein shake TID to optimize nutritional needs (provides 160 kcal, 30 g protein/ shake)   3. Monitor and optimize BG levels between 140-180 mg/dL by medical management   4. Consider adding appetite stimulant such as Remeron or Marinol 2/2 chronically poor appetite/ PO intake   5. Monitor bowel movements, if no BM for >3 days, consider implementing bowel regimen.  6. Consider obtaining vitamin D 25OH level to assess nutriture   7. Consider adding thiamine 100 mg daily 2/2 poor PO intake/ malnutrition and MVI w/ minerals daily to ensure 100% RDA met   8. Confirm goals of care regarding nutrition support   RD will continue to monitor PO intake, labs, hydration, and wt prn.  1. C/w consistent carb diet 2/2 elevated POCTs and encourage protein-rich foods, maximize food preferences   2. Add Premier protein shake TID to optimize nutritional needs (provides 160 kcal, 30 g protein/ shake)   3. Monitor and optimize BG levels between 140-180 mg/dL by medical management   4. Consider adding appetite stimulant such as Remeron or Marinol 2/2 chronically poor appetite/ PO intake   5. Monitor bowel movements, if no BM for >3 days, consider implementing bowel regimen.  6. Consider obtaining vitamin D 25OH level to assess nutriture   7. Consider adding thiamine 100 mg daily 2/2 poor PO intake/ malnutrition and MVI w/ minerals daily to ensure 100% RDA met   8. Confirm goals of care regarding nutrition support   9. Consider adding 500 mg vit C suppl daily and 220 mg zinc sulfate x10 days to promote wound healing  RD will continue to monitor PO intake, labs, hydration, and wt prn.

## 2023-11-07 NOTE — DIETITIAN INITIAL EVALUATION ADULT - PERTINENT MEDS FT
MEDICATIONS  (STANDING):  aspirin  chewable 81 milliGRAM(s) Oral daily  atorvastatin 40 milliGRAM(s) Oral at bedtime  azithromycin  IVPB 500 milliGRAM(s) IV Intermittent every 24 hours  cefTRIAXone Injectable. 1000 milliGRAM(s) IV Push every 24 hours  clopidogrel Tablet 75 milliGRAM(s) Oral daily  dextrose 5%. 1000 milliLiter(s) (100 mL/Hr) IV Continuous <Continuous>  dextrose 5%. 1000 milliLiter(s) (50 mL/Hr) IV Continuous <Continuous>  dextrose 50% Injectable 25 Gram(s) IV Push once  dextrose 50% Injectable 12.5 Gram(s) IV Push once  dextrose 50% Injectable 25 Gram(s) IV Push once  enoxaparin Injectable 80 milliGRAM(s) SubCutaneous every 12 hours  glucagon  Injectable 1 milliGRAM(s) IntraMuscular once  influenza  Vaccine (HIGH DOSE) 0.7 milliLiter(s) IntraMuscular once  insulin glargine Injectable (LANTUS) 10 Unit(s) SubCutaneous at bedtime  insulin lispro (ADMELOG) corrective regimen sliding scale   SubCutaneous three times a day before meals  insulin lispro (ADMELOG) corrective regimen sliding scale   SubCutaneous at bedtime  QUEtiapine 50 milliGRAM(s) Oral at bedtime    MEDICATIONS  (PRN):  acetaminophen     Tablet .. 650 milliGRAM(s) Oral every 6 hours PRN Mild Pain (1 - 3)  dextrose Oral Gel 15 Gram(s) Oral once PRN Blood Glucose LESS THAN 70 milliGRAM(s)/deciliter  ondansetron Injectable 4 milliGRAM(s) IV Push every 6 hours PRN Nausea and/or Vomiting

## 2023-11-07 NOTE — PROGRESS NOTE ADULT - NS ATTEND AMEND GEN_ALL_CORE FT
Patient with above hx advanced age , poor functional status , with NSTEMI normal EF with severe AS improving clinically  on NC oxygen , decreasing demand , would lower the dose of lovenox to DVT prophylaxis from tomorrow AM  continue ecotrin ,  Low normal range SBP limits use of BB   medical management as described above

## 2023-11-07 NOTE — DIETITIAN INITIAL EVALUATION ADULT - OTHER INFO
78 F with Hx of Dementia has been bedbound for about 2 years since getting COVID in 2021.  She is at baseline knows her name and is oriented to person only.  Hx obtained from speaking to the patient's daughter as patient is unable to provide any significant history.  She lives at home with her daughter and she has an aide.  She has not left the house in 2 years.  She was brought to the ED because of shortness of breath, cough and she had one episode of vomiting. In the ED, she was noted to be hypoxic and had a fever to 102 and was tachycardic.  Imaging revealed a concern for multifocal PNA. Admit for PNA.      Unable to obtain diet hx 2/2 dementia and pt unable to provide information upon RD visit yesterday morning. Followed by palliative on 11/6. Currently on consistent carb diet, will recommend to continue 2/2 POCTs reviewed: 183, 207, 211, 204. Received 10 units Lantus and 5 units sliding scale insulin x24 hours, continue to monitor BG and maintain within goal range. Unable to obtain bed scale wt 2/2 bed not in required position upon RD visit. Wt 170# (actual, bed) obtained by RN on 11/7, appears accurate. No wt hx available. Appears weak and frail. NFPE reveals moderate-severe muscle/fat wasting. Will add Premier protein shake TID to optimize nutritional needs. See below for other recs.  78 F with Hx of Dementia has been bedbound for about 2 years since getting COVID in 2021.  She is at baseline knows her name and is oriented to person only.  Hx obtained from speaking to the patient's daughter as patient is unable to provide any significant history.  She lives at home with her daughter and she has an aide.  She has not left the house in 2 years.  She was brought to the ED because of shortness of breath, cough and she had one episode of vomiting. In the ED, she was noted to be hypoxic and had a fever to 102 and was tachycardic.  Imaging revealed a concern for multifocal PNA. Admit for PNA.      Unable to obtain wt hx and relevant information 2/2 dementia upon RD visit yesterday morning. Followed by palliative on 11/6. Currently on consistent carb diet, will recommend to continue 2/2 POCTs reviewed: 183, 207, 211, 204. Received 10 units Lantus and 5 units sliding scale insulin x24 hours, continue to monitor BG and maintain within goal range. Unable to obtain bed scale wt 2/2 bed not in required position upon RD visit. Wt 170# (actual, bed) obtained by RN on 11/7, appears accurate. No wt hx available. Appears weak and frail. NFPE reveals moderate-severe muscle/fat wasting. Will add Premier protein shake TID to optimize nutritional needs. See below for other recs.

## 2023-11-07 NOTE — DIETITIAN INITIAL EVALUATION ADULT - ORAL INTAKE PTA/DIET HISTORY
Unable to obtain diet hx 2/2 dementia and pt unable to provide information upon RD visit yesterday morning. As per H&P, lives at home w/ daughter and has an aide. Experienced  episode of vomiting as per H&P.

## 2023-11-07 NOTE — GOALS OF CARE CONVERSATION - ADVANCED CARE PLANNING - CONVERSATION DETAILS
HPI:    78 F with Hx of Dementia has been bedbound for about 2 years since getting COVID in 2021.  She is at baseline knows her name and is oriented to person only.  Hx obtained from speaking to the patient's daughter as patient is unable to provide any significant history.  She lives at home with her daughter and she has an aide.  She has not left the house in 2 years.  She was brought to the ED because of shortness of breath, cough and she had one episode of vomiting.     (06 Nov 2023 08:16)      PERTINENT PMH REVIEWED:  [ X ] YES [ ] NO           Primary Contact:      Soumya, daughter, phone # [phone]    HCP [  ] Surrogate [ X  ] Guardian [   ]    Mental Status: Pt lacks capacity  Concerns of Depression [  ] -None reported  Anxiety [   ] -None reported   Baseline ADLs (prior to admission):  Independent [ ] moderately [ ] fully   Dependent   [ ] moderately [ x ]fully    Family Meeting attendees: GOC held with daughter    Anticipated Grief: Patient[  ] Family [ X ]    Caregiver [place] Assessed: Yes [ X ] No [  ]    Yarsani: Lutheran.    Spiritual Concerns: Not identified,  available for support.    Goals of Care: To be further discussed    Previous Services: 24/7 aide.    ADVANCE DIRECTIVES:    -Pt lacks capacity  -All four children would be surrogate decisions maker, Patient daughter Jo states that patient lives with Soumya and ok to make decisions, Soumya states that patient lives with her and her two brothers are on the same page about no aggressive measures  -MOLST DNR DNI    Anticipated D/C Plan: to be further determined                     Summary:  Palliative NP, Lili Thomas and this SW met with Pt. bedside earlier this date to introduce our team and offer support.  Pt pleasantly confused, lacks capacity.  Prior to hospitalization, Pt. resided at home with 24/7 aide services.  Pt. is bedbound, requires assistance with ADLs.  This SW spoke with daughterSoumya via telephone to discuss GOC, assist with planning and provide supportive counseling.  Palliative SW role explained.  Emotional support provided.  Daughter shared that Pts quality of life is most important to her and her siblings.  Daughter shared that her father passed away at home 2 years ago and both Pt and spouse expressed the desire to remain at home at the end of their life.  Daughters feelings explored.  Support provided.    Daughter shared what she has discussed with the medical team regarding her moms current medical condition.  We discussed the option to return home to focus on Pts comfort with the support of hospice.  We discussed treating overall symptoms at home and not coming back and forth to the hospital.  The philosophy of hospice care discussed in details as well as the services provided both at home and inpatient hospice.  The qualifications for inpatient hospice noted with daughter.  We discussed the option to return home with the support of hospice as daughter shared it is important to her mother to remain at home.  Daughter to discuss with her siblings HPI:    78 F with Hx of Dementia has been bedbound for about 2 years since getting COVID in 2021.  She is at baseline knows her name and is oriented to person only.  Hx obtained from speaking to the patient's daughter as patient is unable to provide any significant history.  She lives at home with her daughter and she has an aide.  She has not left the house in 2 years.  She was brought to the ED because of shortness of breath, cough and she had one episode of vomiting.     (06 Nov 2023 08:16)      PERTINENT PMH REVIEWED:  [ X ] YES [ ] NO           Primary Contact:      Soumya, daughter, phone # [phone]    HCP [  ] Surrogate [ X  ] Guardian [   ]    Mental Status: Pt lacks capacity  Concerns of Depression [  ] -None reported  Anxiety [   ] -None reported   Baseline ADLs (prior to admission):  Independent [ ] moderately [ ] fully   Dependent   [ ] moderately [ x ]fully    Family Meeting attendees: GOC held with daughter    Anticipated Grief: Patient[  ] Family [ X ]    Caregiver [place] Assessed: Yes [ X ] No [  ]    Mormon: Bahai.    Spiritual Concerns: Not identified,  available for support.    Goals of Care: To be further discussed    Previous Services: 24/7 aide.    ADVANCE DIRECTIVES:    -Pt lacks capacity  -All four children would be surrogate decisions maker, Patient daughter Jo states that patient lives with Soumya and ok to make decisions, Soumya states that patient lives with her and her two brothers are on the same page about no aggressive measures  -MOLST DNR DNI    Anticipated D/C Plan: to be further determined                     Summary:  Palliative NP, Lili Thomas and this SW met with Pt. bedside earlier this date to introduce our team and offer support.  Pt pleasantly confused, lacks capacity.  Prior to hospitalization, Pt. resided at home with 24/7 aide services.  Pt. is bedbound, requires assistance with ADLs.  This SW spoke with daughterSoumya via telephone to discuss GOC, assist with planning and provide supportive counseling.  Palliative SW role explained.  Emotional support provided.  Daughter shared that Pts quality of life is most important to her and her siblings.  Daughter shared that her father passed away at home 2 years ago and both Pt and spouse expressed the desire to remain at home at the end of their life.  Daughters feelings explored.  Support provided.    Daughter shared what she has discussed with the medical team regarding her moms current medical condition.  We discussed the option to return home to focus on Pts comfort with the support of hospice.  We discussed treating overall symptoms at home and not coming back and forth to the hospital to focus on Pts quality of life.  The philosophy of hospice care discussed in details as well as the services provided both at home and inpatient hospice.  The qualifications for inpatient hospice noted with daughter.  We discussed the option to return home with the support of hospice as daughter shared it is important to her mother to remain at home.  Daughter to discuss hospice with her siblings and get back to our team.      Advance directives reviewed.  DNR/DNI in place.  Soumya is point contact as all 4 siblings are surrogate decision makers.    Plan to be further determined.  DNR/DNI in place.  Daughter aware of palliative team availability.  Emotional support provided.  Our team to continue to follow.  Plan

## 2023-11-07 NOTE — DIETITIAN INITIAL EVALUATION ADULT - PERTINENT LABORATORY DATA
11-07    132<L>  |  102  |  18  ----------------------------<  233<H>  4.7   |  24  |  0.52    Ca    8.5      07 Nov 2023 07:21    TPro  6.8  /  Alb  2.7<L>  /  TBili  0.5  /  DBili  x   /  AST  16  /  ALT  11<L>  /  AlkPhos  67  11-06  POCT Blood Glucose.: 183 mg/dL (11-07-23 @ 08:29)

## 2023-11-07 NOTE — DIETITIAN NUTRITION RISK NOTIFICATION - TREATMENT: THE FOLLOWING DIET HAS BEEN RECOMMENDED
Diet, Regular:   Consistent Carbohydrate {Evening Snack} (CSTCHOSN) (11-06-23 @ 08:13) [Active]

## 2023-11-08 ENCOUNTER — TRANSCRIPTION ENCOUNTER (OUTPATIENT)
Age: 78
End: 2023-11-08

## 2023-11-08 LAB
24R-OH-CALCIDIOL SERPL-MCNC: 11.2 NG/ML — LOW (ref 30–80)
24R-OH-CALCIDIOL SERPL-MCNC: 11.2 NG/ML — LOW (ref 30–80)
ALBUMIN SERPL ELPH-MCNC: 2.4 G/DL — LOW (ref 3.3–5)
ALBUMIN SERPL ELPH-MCNC: 2.4 G/DL — LOW (ref 3.3–5)
ALP SERPL-CCNC: 57 U/L — SIGNIFICANT CHANGE UP (ref 40–120)
ALP SERPL-CCNC: 57 U/L — SIGNIFICANT CHANGE UP (ref 40–120)
ALT FLD-CCNC: 24 U/L — SIGNIFICANT CHANGE UP (ref 12–78)
ALT FLD-CCNC: 24 U/L — SIGNIFICANT CHANGE UP (ref 12–78)
AMMONIA BLD-MCNC: 34 UMOL/L — HIGH (ref 11–32)
AMMONIA BLD-MCNC: 34 UMOL/L — HIGH (ref 11–32)
ANION GAP SERPL CALC-SCNC: 8 MMOL/L — SIGNIFICANT CHANGE UP (ref 5–17)
ANION GAP SERPL CALC-SCNC: 8 MMOL/L — SIGNIFICANT CHANGE UP (ref 5–17)
AST SERPL-CCNC: 114 U/L — HIGH (ref 15–37)
AST SERPL-CCNC: 114 U/L — HIGH (ref 15–37)
BASE EXCESS BLDV CALC-SCNC: 0.5 MMOL/L — SIGNIFICANT CHANGE UP (ref -2–3)
BASE EXCESS BLDV CALC-SCNC: 0.5 MMOL/L — SIGNIFICANT CHANGE UP (ref -2–3)
BASOPHILS # BLD AUTO: 0.05 K/UL — SIGNIFICANT CHANGE UP (ref 0–0.2)
BASOPHILS # BLD AUTO: 0.05 K/UL — SIGNIFICANT CHANGE UP (ref 0–0.2)
BASOPHILS NFR BLD AUTO: 0.5 % — SIGNIFICANT CHANGE UP (ref 0–2)
BASOPHILS NFR BLD AUTO: 0.5 % — SIGNIFICANT CHANGE UP (ref 0–2)
BILIRUB SERPL-MCNC: 0.4 MG/DL — SIGNIFICANT CHANGE UP (ref 0.2–1.2)
BILIRUB SERPL-MCNC: 0.4 MG/DL — SIGNIFICANT CHANGE UP (ref 0.2–1.2)
BUN SERPL-MCNC: 18 MG/DL — SIGNIFICANT CHANGE UP (ref 7–23)
BUN SERPL-MCNC: 18 MG/DL — SIGNIFICANT CHANGE UP (ref 7–23)
CALCIUM SERPL-MCNC: 8.8 MG/DL — SIGNIFICANT CHANGE UP (ref 8.5–10.1)
CALCIUM SERPL-MCNC: 8.8 MG/DL — SIGNIFICANT CHANGE UP (ref 8.5–10.1)
CHLORIDE SERPL-SCNC: 100 MMOL/L — SIGNIFICANT CHANGE UP (ref 96–108)
CHLORIDE SERPL-SCNC: 100 MMOL/L — SIGNIFICANT CHANGE UP (ref 96–108)
CHOLEST SERPL-MCNC: 139 MG/DL — SIGNIFICANT CHANGE UP
CHOLEST SERPL-MCNC: 139 MG/DL — SIGNIFICANT CHANGE UP
CK SERPL-CCNC: 377 U/L — HIGH (ref 26–192)
CK SERPL-CCNC: 377 U/L — HIGH (ref 26–192)
CO2 SERPL-SCNC: 22 MMOL/L — SIGNIFICANT CHANGE UP (ref 22–31)
CO2 SERPL-SCNC: 22 MMOL/L — SIGNIFICANT CHANGE UP (ref 22–31)
CREAT SERPL-MCNC: 0.6 MG/DL — SIGNIFICANT CHANGE UP (ref 0.5–1.3)
CREAT SERPL-MCNC: 0.6 MG/DL — SIGNIFICANT CHANGE UP (ref 0.5–1.3)
CRP SERPL-MCNC: 99 MG/L — HIGH
CRP SERPL-MCNC: 99 MG/L — HIGH
EGFR: 92 ML/MIN/1.73M2 — SIGNIFICANT CHANGE UP
EGFR: 92 ML/MIN/1.73M2 — SIGNIFICANT CHANGE UP
EOSINOPHIL # BLD AUTO: 0.1 K/UL — SIGNIFICANT CHANGE UP (ref 0–0.5)
EOSINOPHIL # BLD AUTO: 0.1 K/UL — SIGNIFICANT CHANGE UP (ref 0–0.5)
EOSINOPHIL NFR BLD AUTO: 1 % — SIGNIFICANT CHANGE UP (ref 0–6)
EOSINOPHIL NFR BLD AUTO: 1 % — SIGNIFICANT CHANGE UP (ref 0–6)
FERRITIN SERPL-MCNC: 440 NG/ML — HIGH (ref 13–330)
FERRITIN SERPL-MCNC: 440 NG/ML — HIGH (ref 13–330)
FOLATE SERPL-MCNC: 8.6 NG/ML — SIGNIFICANT CHANGE UP
FOLATE SERPL-MCNC: 8.6 NG/ML — SIGNIFICANT CHANGE UP
GAS PNL BLDV: SIGNIFICANT CHANGE UP
GAS PNL BLDV: SIGNIFICANT CHANGE UP
GLUCOSE BLDC GLUCOMTR-MCNC: 197 MG/DL — HIGH (ref 70–99)
GLUCOSE BLDC GLUCOMTR-MCNC: 197 MG/DL — HIGH (ref 70–99)
GLUCOSE BLDC GLUCOMTR-MCNC: 203 MG/DL — HIGH (ref 70–99)
GLUCOSE BLDC GLUCOMTR-MCNC: 203 MG/DL — HIGH (ref 70–99)
GLUCOSE BLDC GLUCOMTR-MCNC: 205 MG/DL — HIGH (ref 70–99)
GLUCOSE BLDC GLUCOMTR-MCNC: 205 MG/DL — HIGH (ref 70–99)
GLUCOSE BLDC GLUCOMTR-MCNC: 245 MG/DL — HIGH (ref 70–99)
GLUCOSE BLDC GLUCOMTR-MCNC: 245 MG/DL — HIGH (ref 70–99)
GLUCOSE SERPL-MCNC: 244 MG/DL — HIGH (ref 70–99)
GLUCOSE SERPL-MCNC: 244 MG/DL — HIGH (ref 70–99)
HCO3 BLDV-SCNC: 26 MMOL/L — SIGNIFICANT CHANGE UP (ref 22–29)
HCO3 BLDV-SCNC: 26 MMOL/L — SIGNIFICANT CHANGE UP (ref 22–29)
HCT VFR BLD CALC: 33.3 % — LOW (ref 34.5–45)
HCT VFR BLD CALC: 33.3 % — LOW (ref 34.5–45)
HDLC SERPL-MCNC: 44 MG/DL — LOW
HDLC SERPL-MCNC: 44 MG/DL — LOW
HGB BLD-MCNC: 11.2 G/DL — LOW (ref 11.5–15.5)
HGB BLD-MCNC: 11.2 G/DL — LOW (ref 11.5–15.5)
IMM GRANULOCYTES NFR BLD AUTO: 0.4 % — SIGNIFICANT CHANGE UP (ref 0–0.9)
IMM GRANULOCYTES NFR BLD AUTO: 0.4 % — SIGNIFICANT CHANGE UP (ref 0–0.9)
IRON SATN MFR SERPL: 19 UG/DL — LOW (ref 30–160)
IRON SATN MFR SERPL: 19 UG/DL — LOW (ref 30–160)
IRON SATN MFR SERPL: 9 % — LOW (ref 14–50)
IRON SATN MFR SERPL: 9 % — LOW (ref 14–50)
LIPID PNL WITH DIRECT LDL SERPL: 82 MG/DL — SIGNIFICANT CHANGE UP
LIPID PNL WITH DIRECT LDL SERPL: 82 MG/DL — SIGNIFICANT CHANGE UP
LYMPHOCYTES # BLD AUTO: 0.94 K/UL — LOW (ref 1–3.3)
LYMPHOCYTES # BLD AUTO: 0.94 K/UL — LOW (ref 1–3.3)
LYMPHOCYTES # BLD AUTO: 9.7 % — LOW (ref 13–44)
LYMPHOCYTES # BLD AUTO: 9.7 % — LOW (ref 13–44)
MAGNESIUM SERPL-MCNC: 2 MG/DL — SIGNIFICANT CHANGE UP (ref 1.6–2.6)
MAGNESIUM SERPL-MCNC: 2 MG/DL — SIGNIFICANT CHANGE UP (ref 1.6–2.6)
MCHC RBC-ENTMCNC: 30.9 PG — SIGNIFICANT CHANGE UP (ref 27–34)
MCHC RBC-ENTMCNC: 30.9 PG — SIGNIFICANT CHANGE UP (ref 27–34)
MCHC RBC-ENTMCNC: 33.6 GM/DL — SIGNIFICANT CHANGE UP (ref 32–36)
MCHC RBC-ENTMCNC: 33.6 GM/DL — SIGNIFICANT CHANGE UP (ref 32–36)
MCV RBC AUTO: 92 FL — SIGNIFICANT CHANGE UP (ref 80–100)
MCV RBC AUTO: 92 FL — SIGNIFICANT CHANGE UP (ref 80–100)
MONOCYTES # BLD AUTO: 0.82 K/UL — SIGNIFICANT CHANGE UP (ref 0–0.9)
MONOCYTES # BLD AUTO: 0.82 K/UL — SIGNIFICANT CHANGE UP (ref 0–0.9)
MONOCYTES NFR BLD AUTO: 8.4 % — SIGNIFICANT CHANGE UP (ref 2–14)
MONOCYTES NFR BLD AUTO: 8.4 % — SIGNIFICANT CHANGE UP (ref 2–14)
NEUTROPHILS # BLD AUTO: 7.79 K/UL — HIGH (ref 1.8–7.4)
NEUTROPHILS # BLD AUTO: 7.79 K/UL — HIGH (ref 1.8–7.4)
NEUTROPHILS NFR BLD AUTO: 80 % — HIGH (ref 43–77)
NEUTROPHILS NFR BLD AUTO: 80 % — HIGH (ref 43–77)
NON HDL CHOLESTEROL: 95 MG/DL — SIGNIFICANT CHANGE UP
NON HDL CHOLESTEROL: 95 MG/DL — SIGNIFICANT CHANGE UP
NT-PROBNP SERPL-SCNC: HIGH PG/ML (ref 0–450)
NT-PROBNP SERPL-SCNC: HIGH PG/ML (ref 0–450)
PCO2 BLDV: 44 MMHG — HIGH (ref 39–42)
PCO2 BLDV: 44 MMHG — HIGH (ref 39–42)
PH BLDV: 7.38 — SIGNIFICANT CHANGE UP (ref 7.32–7.43)
PH BLDV: 7.38 — SIGNIFICANT CHANGE UP (ref 7.32–7.43)
PHOSPHATE SERPL-MCNC: 2.5 MG/DL — SIGNIFICANT CHANGE UP (ref 2.5–4.5)
PHOSPHATE SERPL-MCNC: 2.5 MG/DL — SIGNIFICANT CHANGE UP (ref 2.5–4.5)
PLATELET # BLD AUTO: 300 K/UL — SIGNIFICANT CHANGE UP (ref 150–400)
PLATELET # BLD AUTO: 300 K/UL — SIGNIFICANT CHANGE UP (ref 150–400)
PO2 BLDV: 110 MMHG — HIGH (ref 25–45)
PO2 BLDV: 110 MMHG — HIGH (ref 25–45)
POTASSIUM SERPL-MCNC: 4.7 MMOL/L — SIGNIFICANT CHANGE UP (ref 3.5–5.3)
POTASSIUM SERPL-MCNC: 4.7 MMOL/L — SIGNIFICANT CHANGE UP (ref 3.5–5.3)
POTASSIUM SERPL-SCNC: 4.7 MMOL/L — SIGNIFICANT CHANGE UP (ref 3.5–5.3)
POTASSIUM SERPL-SCNC: 4.7 MMOL/L — SIGNIFICANT CHANGE UP (ref 3.5–5.3)
PROCALCITONIN SERPL-MCNC: 0.26 NG/ML — HIGH (ref 0.02–0.1)
PROCALCITONIN SERPL-MCNC: 0.26 NG/ML — HIGH (ref 0.02–0.1)
PROT SERPL-MCNC: 6.4 GM/DL — SIGNIFICANT CHANGE UP (ref 6–8.3)
PROT SERPL-MCNC: 6.4 GM/DL — SIGNIFICANT CHANGE UP (ref 6–8.3)
RBC # BLD: 3.62 M/UL — LOW (ref 3.8–5.2)
RBC # BLD: 3.62 M/UL — LOW (ref 3.8–5.2)
RBC # FLD: 12.2 % — SIGNIFICANT CHANGE UP (ref 10.3–14.5)
RBC # FLD: 12.2 % — SIGNIFICANT CHANGE UP (ref 10.3–14.5)
SAO2 % BLDV: 99 % — HIGH (ref 67–88)
SAO2 % BLDV: 99 % — HIGH (ref 67–88)
SODIUM SERPL-SCNC: 130 MMOL/L — LOW (ref 135–145)
SODIUM SERPL-SCNC: 130 MMOL/L — LOW (ref 135–145)
TIBC SERPL-MCNC: 212 UG/DL — LOW (ref 220–430)
TIBC SERPL-MCNC: 212 UG/DL — LOW (ref 220–430)
TRIGL SERPL-MCNC: 63 MG/DL — SIGNIFICANT CHANGE UP
TRIGL SERPL-MCNC: 63 MG/DL — SIGNIFICANT CHANGE UP
TROPONIN I, HIGH SENSITIVITY RESULT: HIGH NG/L
TROPONIN I, HIGH SENSITIVITY RESULT: HIGH NG/L
TSH SERPL-MCNC: 2.56 UU/ML — SIGNIFICANT CHANGE UP (ref 0.34–4.82)
TSH SERPL-MCNC: 2.56 UU/ML — SIGNIFICANT CHANGE UP (ref 0.34–4.82)
UIBC SERPL-MCNC: 193 UG/DL — SIGNIFICANT CHANGE UP (ref 110–370)
UIBC SERPL-MCNC: 193 UG/DL — SIGNIFICANT CHANGE UP (ref 110–370)
VIT B12 SERPL-MCNC: 601 PG/ML — SIGNIFICANT CHANGE UP (ref 232–1245)
VIT B12 SERPL-MCNC: 601 PG/ML — SIGNIFICANT CHANGE UP (ref 232–1245)
WBC # BLD: 9.74 K/UL — SIGNIFICANT CHANGE UP (ref 3.8–10.5)
WBC # BLD: 9.74 K/UL — SIGNIFICANT CHANGE UP (ref 3.8–10.5)
WBC # FLD AUTO: 9.74 K/UL — SIGNIFICANT CHANGE UP (ref 3.8–10.5)
WBC # FLD AUTO: 9.74 K/UL — SIGNIFICANT CHANGE UP (ref 3.8–10.5)

## 2023-11-08 PROCEDURE — 99232 SBSQ HOSP IP/OBS MODERATE 35: CPT

## 2023-11-08 PROCEDURE — 93010 ELECTROCARDIOGRAM REPORT: CPT

## 2023-11-08 RX ORDER — QUETIAPINE FUMARATE 200 MG/1
12.5 TABLET, FILM COATED ORAL AT BEDTIME
Refills: 0 | Status: DISCONTINUED | OUTPATIENT
Start: 2023-11-08 | End: 2023-11-13

## 2023-11-08 RX ORDER — ERGOCALCIFEROL 1.25 MG/1
50000 CAPSULE ORAL ONCE
Refills: 0 | Status: COMPLETED | OUTPATIENT
Start: 2023-11-08 | End: 2023-11-09

## 2023-11-08 RX ORDER — CHOLECALCIFEROL (VITAMIN D3) 125 MCG
2000 CAPSULE ORAL AT BEDTIME
Refills: 0 | Status: DISCONTINUED | OUTPATIENT
Start: 2023-11-09 | End: 2023-11-13

## 2023-11-08 RX ADMIN — Medication 650 MILLIGRAM(S): at 20:00

## 2023-11-08 RX ADMIN — ATORVASTATIN CALCIUM 40 MILLIGRAM(S): 80 TABLET, FILM COATED ORAL at 21:11

## 2023-11-08 RX ADMIN — QUETIAPINE FUMARATE 12.5 MILLIGRAM(S): 200 TABLET, FILM COATED ORAL at 21:12

## 2023-11-08 RX ADMIN — Medication 81 MILLIGRAM(S): at 09:26

## 2023-11-08 RX ADMIN — CLOPIDOGREL BISULFATE 75 MILLIGRAM(S): 75 TABLET, FILM COATED ORAL at 09:26

## 2023-11-08 RX ADMIN — INSULIN GLARGINE 10 UNIT(S): 100 INJECTION, SOLUTION SUBCUTANEOUS at 21:11

## 2023-11-08 RX ADMIN — Medication 650 MILLIGRAM(S): at 06:02

## 2023-11-08 RX ADMIN — Medication 2: at 08:04

## 2023-11-08 RX ADMIN — ENOXAPARIN SODIUM 80 MILLIGRAM(S): 100 INJECTION SUBCUTANEOUS at 09:25

## 2023-11-08 RX ADMIN — Medication 110 MILLIGRAM(S): at 21:11

## 2023-11-08 RX ADMIN — Medication 100 MILLIGRAM(S): at 09:25

## 2023-11-08 RX ADMIN — Medication 650 MILLIGRAM(S): at 21:00

## 2023-11-08 RX ADMIN — Medication 1: at 17:25

## 2023-11-08 RX ADMIN — Medication 650 MILLIGRAM(S): at 13:22

## 2023-11-08 RX ADMIN — Medication 2: at 12:07

## 2023-11-08 RX ADMIN — CEFTRIAXONE 1000 MILLIGRAM(S): 500 INJECTION, POWDER, FOR SOLUTION INTRAMUSCULAR; INTRAVENOUS at 21:11

## 2023-11-08 NOTE — DISCHARGE NOTE NURSING/CASE MANAGEMENT/SOCIAL WORK - PATIENT PORTAL LINK FT
You can access the FollowMyHealth Patient Portal offered by [facility] by registering at the following website: [email or web address]. By joining [employer]’s FollowMyHealth portal, you will also be able to view your health information using other applications (apps) compatible with our system.

## 2023-11-08 NOTE — DISCHARGE NOTE NURSING/CASE MANAGEMENT/SOCIAL WORK - NSDCPEFALRISK_GEN_ALL_CORE
For information on Fall & Injury Prevention, visit: [email or web address] OR  [email or web address] OR  https://www.cdc.gov/steadi/patient.html

## 2023-11-08 NOTE — PROGRESS NOTE ADULT - PROBLEM SELECTOR PLAN 1
·  Problem: Congestive heart failure.   ·  Recommendation: -CHF w/ volume overload likely due to severe aortic stenosis  -known h/o severe aortic stenosis  - s/w family & hospitalist (intmauricio) - no plans for intervention (i.e. TAVR)  given pt's dementia.    -IV lasix being held due to hypotension.  -Cont. strict Is & Os daily wts, fluid restrict 1.5 L/ 24 hrs.  -Also w/ Pna will treat w/ abx as per hospitalist service.
·  Problem: Congestive heart failure.   ·  Recommendation: -CHF w/ volume overload likely due to severe aortic stenosis  -known h/o severe aortic stenosis  - s/w family & hospitalist (Agueda) - no plans for intervention (i.e. TAVR) given pt's dementia.    -IV lasix being held due to hypotension.  -Cont. strict Is & Os daily wts, fluid restrict 1.5 L/ 24 hrs.  -Also w/ Pna will treat w/ abx as per hospitalist service.    Pt. is stable from cardiac standpoint, will sign off

## 2023-11-08 NOTE — PROGRESS NOTE ADULT - PROBLEM SELECTOR PLAN 2
·  Problem: Elevated troponin.   ·  Recommendation: -markedly elevated troponin, trending up 20797. ECG w/ LBBB unable to interpret ischemia.  -no chest pain    - d/w family & hospitalist ([facility]) - no plans for intervention (i.e. cardiac cath)  given pt's dementia.  -Agree w/ conservative mx w/ ASA, statin, lovenox 1mg/kg SC q12hr, plavix 75 daily.    -Palliative med consulted. currently DNR/DNI.
·  Problem: Elevated troponin.   ·  Recommendation: -markedly elevated troponin, trending up 20797. ECG w/ LBBB unable to interpret ischemia.  -no chest pain    - d/w family & hospitalist ([facility]) - no plans for intervention (i.e. cardiac cath)  given pt's dementia.  -Agree w/ conservative mx w/ ASA, statin, lovenox 1mg/kg SC q12hr, plavix 75 daily.    -Palliative med consulted. currently DNR/DNI.

## 2023-11-09 LAB
ANION GAP SERPL CALC-SCNC: 4 MMOL/L — LOW (ref 5–17)
ANION GAP SERPL CALC-SCNC: 4 MMOL/L — LOW (ref 5–17)
BUN SERPL-MCNC: 17 MG/DL — SIGNIFICANT CHANGE UP (ref 7–23)
BUN SERPL-MCNC: 17 MG/DL — SIGNIFICANT CHANGE UP (ref 7–23)
CALCIUM SERPL-MCNC: 8.7 MG/DL — SIGNIFICANT CHANGE UP (ref 8.5–10.1)
CALCIUM SERPL-MCNC: 8.7 MG/DL — SIGNIFICANT CHANGE UP (ref 8.5–10.1)
CHLORIDE SERPL-SCNC: 101 MMOL/L — SIGNIFICANT CHANGE UP (ref 96–108)
CHLORIDE SERPL-SCNC: 101 MMOL/L — SIGNIFICANT CHANGE UP (ref 96–108)
CO2 SERPL-SCNC: 27 MMOL/L — SIGNIFICANT CHANGE UP (ref 22–31)
CO2 SERPL-SCNC: 27 MMOL/L — SIGNIFICANT CHANGE UP (ref 22–31)
CREAT SERPL-MCNC: 0.37 MG/DL — LOW (ref 0.5–1.3)
CREAT SERPL-MCNC: 0.37 MG/DL — LOW (ref 0.5–1.3)
EGFR: 103 ML/MIN/1.73M2 — SIGNIFICANT CHANGE UP
EGFR: 103 ML/MIN/1.73M2 — SIGNIFICANT CHANGE UP
GLUCOSE BLDC GLUCOMTR-MCNC: 166 MG/DL — HIGH (ref 70–99)
GLUCOSE BLDC GLUCOMTR-MCNC: 166 MG/DL — HIGH (ref 70–99)
GLUCOSE BLDC GLUCOMTR-MCNC: 233 MG/DL — HIGH (ref 70–99)
GLUCOSE BLDC GLUCOMTR-MCNC: 233 MG/DL — HIGH (ref 70–99)
GLUCOSE BLDC GLUCOMTR-MCNC: 234 MG/DL — HIGH (ref 70–99)
GLUCOSE BLDC GLUCOMTR-MCNC: 234 MG/DL — HIGH (ref 70–99)
GLUCOSE BLDC GLUCOMTR-MCNC: 280 MG/DL — HIGH (ref 70–99)
GLUCOSE BLDC GLUCOMTR-MCNC: 280 MG/DL — HIGH (ref 70–99)
GLUCOSE SERPL-MCNC: 194 MG/DL — HIGH (ref 70–99)
GLUCOSE SERPL-MCNC: 194 MG/DL — HIGH (ref 70–99)
HCT VFR BLD CALC: 33 % — LOW (ref 34.5–45)
HCT VFR BLD CALC: 33 % — LOW (ref 34.5–45)
HGB BLD-MCNC: 10.9 G/DL — LOW (ref 11.5–15.5)
HGB BLD-MCNC: 10.9 G/DL — LOW (ref 11.5–15.5)
MAGNESIUM SERPL-MCNC: 1.8 MG/DL — SIGNIFICANT CHANGE UP (ref 1.6–2.6)
MAGNESIUM SERPL-MCNC: 1.8 MG/DL — SIGNIFICANT CHANGE UP (ref 1.6–2.6)
MCHC RBC-ENTMCNC: 30.4 PG — SIGNIFICANT CHANGE UP (ref 27–34)
MCHC RBC-ENTMCNC: 30.4 PG — SIGNIFICANT CHANGE UP (ref 27–34)
MCHC RBC-ENTMCNC: 33 GM/DL — SIGNIFICANT CHANGE UP (ref 32–36)
MCHC RBC-ENTMCNC: 33 GM/DL — SIGNIFICANT CHANGE UP (ref 32–36)
MCV RBC AUTO: 92.2 FL — SIGNIFICANT CHANGE UP (ref 80–100)
MCV RBC AUTO: 92.2 FL — SIGNIFICANT CHANGE UP (ref 80–100)
NT-PROBNP SERPL-SCNC: HIGH PG/ML (ref 0–450)
NT-PROBNP SERPL-SCNC: HIGH PG/ML (ref 0–450)
PHOSPHATE SERPL-MCNC: 2.2 MG/DL — LOW (ref 2.5–4.5)
PHOSPHATE SERPL-MCNC: 2.2 MG/DL — LOW (ref 2.5–4.5)
PLATELET # BLD AUTO: 344 K/UL — SIGNIFICANT CHANGE UP (ref 150–400)
PLATELET # BLD AUTO: 344 K/UL — SIGNIFICANT CHANGE UP (ref 150–400)
POTASSIUM SERPL-MCNC: 4.4 MMOL/L — SIGNIFICANT CHANGE UP (ref 3.5–5.3)
POTASSIUM SERPL-MCNC: 4.4 MMOL/L — SIGNIFICANT CHANGE UP (ref 3.5–5.3)
POTASSIUM SERPL-SCNC: 4.4 MMOL/L — SIGNIFICANT CHANGE UP (ref 3.5–5.3)
POTASSIUM SERPL-SCNC: 4.4 MMOL/L — SIGNIFICANT CHANGE UP (ref 3.5–5.3)
RBC # BLD: 3.58 M/UL — LOW (ref 3.8–5.2)
RBC # BLD: 3.58 M/UL — LOW (ref 3.8–5.2)
RBC # FLD: 12.2 % — SIGNIFICANT CHANGE UP (ref 10.3–14.5)
RBC # FLD: 12.2 % — SIGNIFICANT CHANGE UP (ref 10.3–14.5)
SODIUM SERPL-SCNC: 132 MMOL/L — LOW (ref 135–145)
SODIUM SERPL-SCNC: 132 MMOL/L — LOW (ref 135–145)
TROPONIN I, HIGH SENSITIVITY RESULT: HIGH NG/L
TROPONIN I, HIGH SENSITIVITY RESULT: HIGH NG/L
WBC # BLD: 8.86 K/UL — SIGNIFICANT CHANGE UP (ref 3.8–10.5)
WBC # BLD: 8.86 K/UL — SIGNIFICANT CHANGE UP (ref 3.8–10.5)
WBC # FLD AUTO: 8.86 K/UL — SIGNIFICANT CHANGE UP (ref 3.8–10.5)
WBC # FLD AUTO: 8.86 K/UL — SIGNIFICANT CHANGE UP (ref 3.8–10.5)

## 2023-11-09 PROCEDURE — 93010 ELECTROCARDIOGRAM REPORT: CPT

## 2023-11-09 PROCEDURE — 99232 SBSQ HOSP IP/OBS MODERATE 35: CPT

## 2023-11-09 RX ORDER — POLYETHYLENE GLYCOL 3350 17 G/17G
17 POWDER, FOR SOLUTION ORAL DAILY
Refills: 0 | Status: DISCONTINUED | OUTPATIENT
Start: 2023-11-09 | End: 2023-11-13

## 2023-11-09 RX ORDER — MAGNESIUM OXIDE 400 MG ORAL TABLET 241.3 MG
400 TABLET ORAL DAILY
Refills: 0 | Status: DISCONTINUED | OUTPATIENT
Start: 2023-11-09 | End: 2023-11-13

## 2023-11-09 RX ADMIN — CEFTRIAXONE 1000 MILLIGRAM(S): 500 INJECTION, POWDER, FOR SOLUTION INTRAMUSCULAR; INTRAVENOUS at 21:11

## 2023-11-09 RX ADMIN — Medication 650 MILLIGRAM(S): at 06:46

## 2023-11-09 RX ADMIN — Medication 110 MILLIGRAM(S): at 21:11

## 2023-11-09 RX ADMIN — ENOXAPARIN SODIUM 80 MILLIGRAM(S): 100 INJECTION SUBCUTANEOUS at 10:32

## 2023-11-09 RX ADMIN — Medication 2: at 12:13

## 2023-11-09 RX ADMIN — CLOPIDOGREL BISULFATE 75 MILLIGRAM(S): 75 TABLET, FILM COATED ORAL at 10:32

## 2023-11-09 RX ADMIN — Medication 650 MILLIGRAM(S): at 05:46

## 2023-11-09 RX ADMIN — ATORVASTATIN CALCIUM 40 MILLIGRAM(S): 80 TABLET, FILM COATED ORAL at 21:14

## 2023-11-09 RX ADMIN — Medication 1: at 08:20

## 2023-11-09 RX ADMIN — Medication 81 MILLIGRAM(S): at 10:32

## 2023-11-09 RX ADMIN — INSULIN GLARGINE 10 UNIT(S): 100 INJECTION, SOLUTION SUBCUTANEOUS at 21:13

## 2023-11-09 RX ADMIN — Medication 62.5 MILLIMOLE(S): at 11:41

## 2023-11-09 RX ADMIN — Medication 100 MILLIGRAM(S): at 10:32

## 2023-11-09 RX ADMIN — Medication 110 MILLIGRAM(S): at 10:31

## 2023-11-09 RX ADMIN — Medication 3: at 17:18

## 2023-11-09 RX ADMIN — QUETIAPINE FUMARATE 12.5 MILLIGRAM(S): 200 TABLET, FILM COATED ORAL at 21:14

## 2023-11-09 RX ADMIN — ENOXAPARIN SODIUM 80 MILLIGRAM(S): 100 INJECTION SUBCUTANEOUS at 21:13

## 2023-11-09 RX ADMIN — Medication 2000 UNIT(S): at 21:13

## 2023-11-09 RX ADMIN — ERGOCALCIFEROL 50000 UNIT(S): 1.25 CAPSULE ORAL at 05:17

## 2023-11-09 RX ADMIN — Medication 5 MILLIGRAM(S): at 21:14

## 2023-11-10 LAB
APPEARANCE UR: CLEAR — SIGNIFICANT CHANGE UP
APPEARANCE UR: CLEAR — SIGNIFICANT CHANGE UP
BILIRUB UR-MCNC: NEGATIVE — SIGNIFICANT CHANGE UP
BILIRUB UR-MCNC: NEGATIVE — SIGNIFICANT CHANGE UP
CK SERPL-CCNC: 93 U/L — SIGNIFICANT CHANGE UP (ref 26–192)
CK SERPL-CCNC: 93 U/L — SIGNIFICANT CHANGE UP (ref 26–192)
COLOR SPEC: SIGNIFICANT CHANGE UP
COLOR SPEC: SIGNIFICANT CHANGE UP
CRP SERPL-MCNC: 57 MG/L — HIGH
CRP SERPL-MCNC: 57 MG/L — HIGH
CRP SERPL-MCNC: 89 MG/L — HIGH
CRP SERPL-MCNC: 89 MG/L — HIGH
DIFF PNL FLD: NEGATIVE — SIGNIFICANT CHANGE UP
DIFF PNL FLD: NEGATIVE — SIGNIFICANT CHANGE UP
GLUCOSE BLDC GLUCOMTR-MCNC: 153 MG/DL — HIGH (ref 70–99)
GLUCOSE BLDC GLUCOMTR-MCNC: 153 MG/DL — HIGH (ref 70–99)
GLUCOSE BLDC GLUCOMTR-MCNC: 157 MG/DL — HIGH (ref 70–99)
GLUCOSE BLDC GLUCOMTR-MCNC: 157 MG/DL — HIGH (ref 70–99)
GLUCOSE BLDC GLUCOMTR-MCNC: 201 MG/DL — HIGH (ref 70–99)
GLUCOSE BLDC GLUCOMTR-MCNC: 201 MG/DL — HIGH (ref 70–99)
GLUCOSE BLDC GLUCOMTR-MCNC: 207 MG/DL — HIGH (ref 70–99)
GLUCOSE BLDC GLUCOMTR-MCNC: 207 MG/DL — HIGH (ref 70–99)
GLUCOSE UR QL: NEGATIVE MG/DL — SIGNIFICANT CHANGE UP
GLUCOSE UR QL: NEGATIVE MG/DL — SIGNIFICANT CHANGE UP
HCT VFR BLD CALC: 33.3 % — LOW (ref 34.5–45)
HCT VFR BLD CALC: 33.3 % — LOW (ref 34.5–45)
HGB BLD-MCNC: 11.2 G/DL — LOW (ref 11.5–15.5)
HGB BLD-MCNC: 11.2 G/DL — LOW (ref 11.5–15.5)
KETONES UR-MCNC: NEGATIVE MG/DL — SIGNIFICANT CHANGE UP
KETONES UR-MCNC: NEGATIVE MG/DL — SIGNIFICANT CHANGE UP
LEUKOCYTE ESTERASE UR-ACNC: NEGATIVE — SIGNIFICANT CHANGE UP
LEUKOCYTE ESTERASE UR-ACNC: NEGATIVE — SIGNIFICANT CHANGE UP
MAGNESIUM SERPL-MCNC: 1.9 MG/DL — SIGNIFICANT CHANGE UP (ref 1.6–2.6)
MAGNESIUM SERPL-MCNC: 1.9 MG/DL — SIGNIFICANT CHANGE UP (ref 1.6–2.6)
MCHC RBC-ENTMCNC: 31.1 PG — SIGNIFICANT CHANGE UP (ref 27–34)
MCHC RBC-ENTMCNC: 31.1 PG — SIGNIFICANT CHANGE UP (ref 27–34)
MCHC RBC-ENTMCNC: 33.6 GM/DL — SIGNIFICANT CHANGE UP (ref 32–36)
MCHC RBC-ENTMCNC: 33.6 GM/DL — SIGNIFICANT CHANGE UP (ref 32–36)
MCV RBC AUTO: 92.5 FL — SIGNIFICANT CHANGE UP (ref 80–100)
MCV RBC AUTO: 92.5 FL — SIGNIFICANT CHANGE UP (ref 80–100)
NITRITE UR-MCNC: NEGATIVE — SIGNIFICANT CHANGE UP
NITRITE UR-MCNC: NEGATIVE — SIGNIFICANT CHANGE UP
NT-PROBNP SERPL-SCNC: HIGH PG/ML (ref 0–450)
NT-PROBNP SERPL-SCNC: HIGH PG/ML (ref 0–450)
PH UR: 5.5 — SIGNIFICANT CHANGE UP (ref 5–8)
PH UR: 5.5 — SIGNIFICANT CHANGE UP (ref 5–8)
PHOSPHATE SERPL-MCNC: 2.5 MG/DL — SIGNIFICANT CHANGE UP (ref 2.5–4.5)
PHOSPHATE SERPL-MCNC: 2.5 MG/DL — SIGNIFICANT CHANGE UP (ref 2.5–4.5)
PLATELET # BLD AUTO: 426 K/UL — HIGH (ref 150–400)
PLATELET # BLD AUTO: 426 K/UL — HIGH (ref 150–400)
PROT UR-MCNC: SIGNIFICANT CHANGE UP MG/DL
PROT UR-MCNC: SIGNIFICANT CHANGE UP MG/DL
RBC # BLD: 3.6 M/UL — LOW (ref 3.8–5.2)
RBC # BLD: 3.6 M/UL — LOW (ref 3.8–5.2)
RBC # FLD: 12.3 % — SIGNIFICANT CHANGE UP (ref 10.3–14.5)
RBC # FLD: 12.3 % — SIGNIFICANT CHANGE UP (ref 10.3–14.5)
SP GR SPEC: 1.03 — SIGNIFICANT CHANGE UP (ref 1–1.03)
SP GR SPEC: 1.03 — SIGNIFICANT CHANGE UP (ref 1–1.03)
TROPONIN I, HIGH SENSITIVITY RESULT: HIGH NG/L
TROPONIN I, HIGH SENSITIVITY RESULT: HIGH NG/L
UROBILINOGEN FLD QL: 0.2 MG/DL — SIGNIFICANT CHANGE UP (ref 0.2–1)
UROBILINOGEN FLD QL: 0.2 MG/DL — SIGNIFICANT CHANGE UP (ref 0.2–1)
WBC # BLD: 9.23 K/UL — SIGNIFICANT CHANGE UP (ref 3.8–10.5)
WBC # BLD: 9.23 K/UL — SIGNIFICANT CHANGE UP (ref 3.8–10.5)
WBC # FLD AUTO: 9.23 K/UL — SIGNIFICANT CHANGE UP (ref 3.8–10.5)
WBC # FLD AUTO: 9.23 K/UL — SIGNIFICANT CHANGE UP (ref 3.8–10.5)

## 2023-11-10 PROCEDURE — 99232 SBSQ HOSP IP/OBS MODERATE 35: CPT

## 2023-11-10 PROCEDURE — 71045 X-RAY EXAM CHEST 1 VIEW: CPT | Mod: 26

## 2023-11-10 PROCEDURE — 93010 ELECTROCARDIOGRAM REPORT: CPT

## 2023-11-10 PROCEDURE — 93306 TTE W/DOPPLER COMPLETE: CPT | Mod: 26

## 2023-11-10 PROCEDURE — 99233 SBSQ HOSP IP/OBS HIGH 50: CPT

## 2023-11-10 RX ORDER — BETHANECHOL CHLORIDE 25 MG
10 TABLET ORAL EVERY 8 HOURS
Refills: 0 | Status: DISCONTINUED | OUTPATIENT
Start: 2023-11-10 | End: 2023-11-13

## 2023-11-10 RX ORDER — LACTULOSE 10 G/15ML
10 SOLUTION ORAL ONCE
Refills: 0 | Status: COMPLETED | OUTPATIENT
Start: 2023-11-10 | End: 2023-11-10

## 2023-11-10 RX ORDER — ENOXAPARIN SODIUM 100 MG/ML
40 INJECTION SUBCUTANEOUS EVERY 24 HOURS
Refills: 0 | Status: DISCONTINUED | OUTPATIENT
Start: 2023-11-10 | End: 2023-11-13

## 2023-11-10 RX ADMIN — Medication 10 MILLIGRAM(S): at 22:03

## 2023-11-10 RX ADMIN — QUETIAPINE FUMARATE 12.5 MILLIGRAM(S): 200 TABLET, FILM COATED ORAL at 22:04

## 2023-11-10 RX ADMIN — Medication 2000 UNIT(S): at 22:03

## 2023-11-10 RX ADMIN — Medication 5 MILLIGRAM(S): at 22:04

## 2023-11-10 RX ADMIN — POLYETHYLENE GLYCOL 3350 17 GRAM(S): 17 POWDER, FOR SOLUTION ORAL at 10:47

## 2023-11-10 RX ADMIN — Medication 110 MILLIGRAM(S): at 10:49

## 2023-11-10 RX ADMIN — ATORVASTATIN CALCIUM 40 MILLIGRAM(S): 80 TABLET, FILM COATED ORAL at 22:03

## 2023-11-10 RX ADMIN — Medication 5 MILLIGRAM(S): at 10:45

## 2023-11-10 RX ADMIN — LACTULOSE 10 GRAM(S): 10 SOLUTION ORAL at 17:29

## 2023-11-10 RX ADMIN — Medication 100 MILLIGRAM(S): at 12:52

## 2023-11-10 RX ADMIN — CEFTRIAXONE 1000 MILLIGRAM(S): 500 INJECTION, POWDER, FOR SOLUTION INTRAMUSCULAR; INTRAVENOUS at 22:03

## 2023-11-10 RX ADMIN — Medication 110 MILLIGRAM(S): at 22:04

## 2023-11-10 RX ADMIN — Medication 1: at 08:46

## 2023-11-10 RX ADMIN — ENOXAPARIN SODIUM 40 MILLIGRAM(S): 100 INJECTION SUBCUTANEOUS at 12:54

## 2023-11-10 RX ADMIN — Medication 81 MILLIGRAM(S): at 10:46

## 2023-11-10 RX ADMIN — MAGNESIUM OXIDE 400 MG ORAL TABLET 400 MILLIGRAM(S): 241.3 TABLET ORAL at 10:45

## 2023-11-10 RX ADMIN — Medication 10 MILLIGRAM(S): at 17:23

## 2023-11-10 RX ADMIN — CLOPIDOGREL BISULFATE 75 MILLIGRAM(S): 75 TABLET, FILM COATED ORAL at 10:46

## 2023-11-10 RX ADMIN — Medication 2: at 17:21

## 2023-11-10 RX ADMIN — INSULIN GLARGINE 10 UNIT(S): 100 INJECTION, SOLUTION SUBCUTANEOUS at 22:03

## 2023-11-10 RX ADMIN — Medication 1: at 12:51

## 2023-11-11 LAB
ANION GAP SERPL CALC-SCNC: 8 MMOL/L — SIGNIFICANT CHANGE UP (ref 5–17)
ANION GAP SERPL CALC-SCNC: 8 MMOL/L — SIGNIFICANT CHANGE UP (ref 5–17)
BUN SERPL-MCNC: 13 MG/DL — SIGNIFICANT CHANGE UP (ref 7–23)
BUN SERPL-MCNC: 13 MG/DL — SIGNIFICANT CHANGE UP (ref 7–23)
CALCIUM SERPL-MCNC: 8.7 MG/DL — SIGNIFICANT CHANGE UP (ref 8.5–10.1)
CALCIUM SERPL-MCNC: 8.7 MG/DL — SIGNIFICANT CHANGE UP (ref 8.5–10.1)
CHLORIDE SERPL-SCNC: 100 MMOL/L — SIGNIFICANT CHANGE UP (ref 96–108)
CHLORIDE SERPL-SCNC: 100 MMOL/L — SIGNIFICANT CHANGE UP (ref 96–108)
CO2 SERPL-SCNC: 25 MMOL/L — SIGNIFICANT CHANGE UP (ref 22–31)
CO2 SERPL-SCNC: 25 MMOL/L — SIGNIFICANT CHANGE UP (ref 22–31)
CREAT SERPL-MCNC: 0.41 MG/DL — LOW (ref 0.5–1.3)
CREAT SERPL-MCNC: 0.41 MG/DL — LOW (ref 0.5–1.3)
CULTURE RESULTS: NO GROWTH — SIGNIFICANT CHANGE UP
CULTURE RESULTS: NO GROWTH — SIGNIFICANT CHANGE UP
CULTURE RESULTS: SIGNIFICANT CHANGE UP
EGFR: 101 ML/MIN/1.73M2 — SIGNIFICANT CHANGE UP
EGFR: 101 ML/MIN/1.73M2 — SIGNIFICANT CHANGE UP
GLUCOSE BLDC GLUCOMTR-MCNC: 152 MG/DL — HIGH (ref 70–99)
GLUCOSE BLDC GLUCOMTR-MCNC: 152 MG/DL — HIGH (ref 70–99)
GLUCOSE BLDC GLUCOMTR-MCNC: 172 MG/DL — HIGH (ref 70–99)
GLUCOSE BLDC GLUCOMTR-MCNC: 172 MG/DL — HIGH (ref 70–99)
GLUCOSE BLDC GLUCOMTR-MCNC: 183 MG/DL — HIGH (ref 70–99)
GLUCOSE BLDC GLUCOMTR-MCNC: 183 MG/DL — HIGH (ref 70–99)
GLUCOSE BLDC GLUCOMTR-MCNC: 191 MG/DL — HIGH (ref 70–99)
GLUCOSE BLDC GLUCOMTR-MCNC: 191 MG/DL — HIGH (ref 70–99)
GLUCOSE SERPL-MCNC: 181 MG/DL — HIGH (ref 70–99)
GLUCOSE SERPL-MCNC: 181 MG/DL — HIGH (ref 70–99)
HCT VFR BLD CALC: 33.3 % — LOW (ref 34.5–45)
HCT VFR BLD CALC: 33.3 % — LOW (ref 34.5–45)
HGB BLD-MCNC: 11.4 G/DL — LOW (ref 11.5–15.5)
HGB BLD-MCNC: 11.4 G/DL — LOW (ref 11.5–15.5)
MAGNESIUM SERPL-MCNC: 1.9 MG/DL — SIGNIFICANT CHANGE UP (ref 1.6–2.6)
MAGNESIUM SERPL-MCNC: 1.9 MG/DL — SIGNIFICANT CHANGE UP (ref 1.6–2.6)
MCHC RBC-ENTMCNC: 31.1 PG — SIGNIFICANT CHANGE UP (ref 27–34)
MCHC RBC-ENTMCNC: 31.1 PG — SIGNIFICANT CHANGE UP (ref 27–34)
MCHC RBC-ENTMCNC: 34.2 GM/DL — SIGNIFICANT CHANGE UP (ref 32–36)
MCHC RBC-ENTMCNC: 34.2 GM/DL — SIGNIFICANT CHANGE UP (ref 32–36)
MCV RBC AUTO: 90.7 FL — SIGNIFICANT CHANGE UP (ref 80–100)
MCV RBC AUTO: 90.7 FL — SIGNIFICANT CHANGE UP (ref 80–100)
PHOSPHATE SERPL-MCNC: 2.9 MG/DL — SIGNIFICANT CHANGE UP (ref 2.5–4.5)
PHOSPHATE SERPL-MCNC: 2.9 MG/DL — SIGNIFICANT CHANGE UP (ref 2.5–4.5)
PLATELET # BLD AUTO: 450 K/UL — HIGH (ref 150–400)
PLATELET # BLD AUTO: 450 K/UL — HIGH (ref 150–400)
POTASSIUM SERPL-MCNC: 4 MMOL/L — SIGNIFICANT CHANGE UP (ref 3.5–5.3)
POTASSIUM SERPL-MCNC: 4 MMOL/L — SIGNIFICANT CHANGE UP (ref 3.5–5.3)
POTASSIUM SERPL-SCNC: 4 MMOL/L — SIGNIFICANT CHANGE UP (ref 3.5–5.3)
POTASSIUM SERPL-SCNC: 4 MMOL/L — SIGNIFICANT CHANGE UP (ref 3.5–5.3)
RBC # BLD: 3.67 M/UL — LOW (ref 3.8–5.2)
RBC # BLD: 3.67 M/UL — LOW (ref 3.8–5.2)
RBC # FLD: 12.3 % — SIGNIFICANT CHANGE UP (ref 10.3–14.5)
RBC # FLD: 12.3 % — SIGNIFICANT CHANGE UP (ref 10.3–14.5)
SODIUM SERPL-SCNC: 133 MMOL/L — LOW (ref 135–145)
SODIUM SERPL-SCNC: 133 MMOL/L — LOW (ref 135–145)
SPECIMEN SOURCE: SIGNIFICANT CHANGE UP
WBC # BLD: 10.4 K/UL — SIGNIFICANT CHANGE UP (ref 3.8–10.5)
WBC # BLD: 10.4 K/UL — SIGNIFICANT CHANGE UP (ref 3.8–10.5)
WBC # FLD AUTO: 10.4 K/UL — SIGNIFICANT CHANGE UP (ref 3.8–10.5)
WBC # FLD AUTO: 10.4 K/UL — SIGNIFICANT CHANGE UP (ref 3.8–10.5)

## 2023-11-11 PROCEDURE — 99232 SBSQ HOSP IP/OBS MODERATE 35: CPT

## 2023-11-11 RX ADMIN — Medication 110 MILLIGRAM(S): at 22:28

## 2023-11-11 RX ADMIN — ENOXAPARIN SODIUM 40 MILLIGRAM(S): 100 INJECTION SUBCUTANEOUS at 13:34

## 2023-11-11 RX ADMIN — Medication 10 MILLIGRAM(S): at 13:33

## 2023-11-11 RX ADMIN — QUETIAPINE FUMARATE 12.5 MILLIGRAM(S): 200 TABLET, FILM COATED ORAL at 22:23

## 2023-11-11 RX ADMIN — Medication 5 MILLIGRAM(S): at 22:23

## 2023-11-11 RX ADMIN — Medication 81 MILLIGRAM(S): at 09:45

## 2023-11-11 RX ADMIN — CLOPIDOGREL BISULFATE 75 MILLIGRAM(S): 75 TABLET, FILM COATED ORAL at 09:45

## 2023-11-11 RX ADMIN — Medication 5 MILLIGRAM(S): at 09:46

## 2023-11-11 RX ADMIN — Medication 110 MILLIGRAM(S): at 09:46

## 2023-11-11 RX ADMIN — Medication 2000 UNIT(S): at 22:23

## 2023-11-11 RX ADMIN — Medication 10 MILLIGRAM(S): at 22:23

## 2023-11-11 RX ADMIN — POLYETHYLENE GLYCOL 3350 17 GRAM(S): 17 POWDER, FOR SOLUTION ORAL at 09:46

## 2023-11-11 RX ADMIN — Medication 1: at 17:05

## 2023-11-11 RX ADMIN — Medication 1: at 08:43

## 2023-11-11 RX ADMIN — Medication 100 MILLIGRAM(S): at 09:45

## 2023-11-11 RX ADMIN — ATORVASTATIN CALCIUM 40 MILLIGRAM(S): 80 TABLET, FILM COATED ORAL at 22:23

## 2023-11-11 RX ADMIN — INSULIN GLARGINE 10 UNIT(S): 100 INJECTION, SOLUTION SUBCUTANEOUS at 22:28

## 2023-11-11 RX ADMIN — MAGNESIUM OXIDE 400 MG ORAL TABLET 400 MILLIGRAM(S): 241.3 TABLET ORAL at 09:45

## 2023-11-11 RX ADMIN — Medication 10 MILLIGRAM(S): at 05:41

## 2023-11-12 LAB
ALBUMIN SERPL ELPH-MCNC: 2.3 G/DL — LOW (ref 3.3–5)
ALBUMIN SERPL ELPH-MCNC: 2.3 G/DL — LOW (ref 3.3–5)
ALP SERPL-CCNC: 64 U/L — SIGNIFICANT CHANGE UP (ref 40–120)
ALP SERPL-CCNC: 64 U/L — SIGNIFICANT CHANGE UP (ref 40–120)
ALT FLD-CCNC: 20 U/L — SIGNIFICANT CHANGE UP (ref 12–78)
ALT FLD-CCNC: 20 U/L — SIGNIFICANT CHANGE UP (ref 12–78)
ANION GAP SERPL CALC-SCNC: 7 MMOL/L — SIGNIFICANT CHANGE UP (ref 5–17)
ANION GAP SERPL CALC-SCNC: 7 MMOL/L — SIGNIFICANT CHANGE UP (ref 5–17)
AST SERPL-CCNC: 27 U/L — SIGNIFICANT CHANGE UP (ref 15–37)
AST SERPL-CCNC: 27 U/L — SIGNIFICANT CHANGE UP (ref 15–37)
BASOPHILS # BLD AUTO: 0.09 K/UL — SIGNIFICANT CHANGE UP (ref 0–0.2)
BASOPHILS # BLD AUTO: 0.09 K/UL — SIGNIFICANT CHANGE UP (ref 0–0.2)
BASOPHILS NFR BLD AUTO: 0.8 % — SIGNIFICANT CHANGE UP (ref 0–2)
BASOPHILS NFR BLD AUTO: 0.8 % — SIGNIFICANT CHANGE UP (ref 0–2)
BILIRUB SERPL-MCNC: 0.4 MG/DL — SIGNIFICANT CHANGE UP (ref 0.2–1.2)
BILIRUB SERPL-MCNC: 0.4 MG/DL — SIGNIFICANT CHANGE UP (ref 0.2–1.2)
BUN SERPL-MCNC: 16 MG/DL — SIGNIFICANT CHANGE UP (ref 7–23)
BUN SERPL-MCNC: 16 MG/DL — SIGNIFICANT CHANGE UP (ref 7–23)
CALCIUM SERPL-MCNC: 8.5 MG/DL — SIGNIFICANT CHANGE UP (ref 8.5–10.1)
CALCIUM SERPL-MCNC: 8.5 MG/DL — SIGNIFICANT CHANGE UP (ref 8.5–10.1)
CHLORIDE SERPL-SCNC: 101 MMOL/L — SIGNIFICANT CHANGE UP (ref 96–108)
CHLORIDE SERPL-SCNC: 101 MMOL/L — SIGNIFICANT CHANGE UP (ref 96–108)
CO2 SERPL-SCNC: 25 MMOL/L — SIGNIFICANT CHANGE UP (ref 22–31)
CO2 SERPL-SCNC: 25 MMOL/L — SIGNIFICANT CHANGE UP (ref 22–31)
CREAT SERPL-MCNC: 0.44 MG/DL — LOW (ref 0.5–1.3)
CREAT SERPL-MCNC: 0.44 MG/DL — LOW (ref 0.5–1.3)
CRP SERPL-MCNC: 24 MG/L — HIGH
CRP SERPL-MCNC: 24 MG/L — HIGH
EGFR: 99 ML/MIN/1.73M2 — SIGNIFICANT CHANGE UP
EGFR: 99 ML/MIN/1.73M2 — SIGNIFICANT CHANGE UP
EOSINOPHIL # BLD AUTO: 0.51 K/UL — HIGH (ref 0–0.5)
EOSINOPHIL # BLD AUTO: 0.51 K/UL — HIGH (ref 0–0.5)
EOSINOPHIL NFR BLD AUTO: 4.7 % — SIGNIFICANT CHANGE UP (ref 0–6)
EOSINOPHIL NFR BLD AUTO: 4.7 % — SIGNIFICANT CHANGE UP (ref 0–6)
GLUCOSE BLDC GLUCOMTR-MCNC: 140 MG/DL — HIGH (ref 70–99)
GLUCOSE BLDC GLUCOMTR-MCNC: 140 MG/DL — HIGH (ref 70–99)
GLUCOSE BLDC GLUCOMTR-MCNC: 146 MG/DL — HIGH (ref 70–99)
GLUCOSE BLDC GLUCOMTR-MCNC: 146 MG/DL — HIGH (ref 70–99)
GLUCOSE BLDC GLUCOMTR-MCNC: 166 MG/DL — HIGH (ref 70–99)
GLUCOSE BLDC GLUCOMTR-MCNC: 166 MG/DL — HIGH (ref 70–99)
GLUCOSE BLDC GLUCOMTR-MCNC: 178 MG/DL — HIGH (ref 70–99)
GLUCOSE BLDC GLUCOMTR-MCNC: 178 MG/DL — HIGH (ref 70–99)
GLUCOSE SERPL-MCNC: 168 MG/DL — HIGH (ref 70–99)
GLUCOSE SERPL-MCNC: 168 MG/DL — HIGH (ref 70–99)
HCT VFR BLD CALC: 35.4 % — SIGNIFICANT CHANGE UP (ref 34.5–45)
HCT VFR BLD CALC: 35.4 % — SIGNIFICANT CHANGE UP (ref 34.5–45)
HGB BLD-MCNC: 11.7 G/DL — SIGNIFICANT CHANGE UP (ref 11.5–15.5)
HGB BLD-MCNC: 11.7 G/DL — SIGNIFICANT CHANGE UP (ref 11.5–15.5)
IMM GRANULOCYTES NFR BLD AUTO: 0.6 % — SIGNIFICANT CHANGE UP (ref 0–0.9)
IMM GRANULOCYTES NFR BLD AUTO: 0.6 % — SIGNIFICANT CHANGE UP (ref 0–0.9)
LYMPHOCYTES # BLD AUTO: 1.65 K/UL — SIGNIFICANT CHANGE UP (ref 1–3.3)
LYMPHOCYTES # BLD AUTO: 1.65 K/UL — SIGNIFICANT CHANGE UP (ref 1–3.3)
LYMPHOCYTES # BLD AUTO: 15.3 % — SIGNIFICANT CHANGE UP (ref 13–44)
LYMPHOCYTES # BLD AUTO: 15.3 % — SIGNIFICANT CHANGE UP (ref 13–44)
MAGNESIUM SERPL-MCNC: 1.9 MG/DL — SIGNIFICANT CHANGE UP (ref 1.6–2.6)
MAGNESIUM SERPL-MCNC: 1.9 MG/DL — SIGNIFICANT CHANGE UP (ref 1.6–2.6)
MCHC RBC-ENTMCNC: 30.5 PG — SIGNIFICANT CHANGE UP (ref 27–34)
MCHC RBC-ENTMCNC: 30.5 PG — SIGNIFICANT CHANGE UP (ref 27–34)
MCHC RBC-ENTMCNC: 33.1 GM/DL — SIGNIFICANT CHANGE UP (ref 32–36)
MCHC RBC-ENTMCNC: 33.1 GM/DL — SIGNIFICANT CHANGE UP (ref 32–36)
MCV RBC AUTO: 92.4 FL — SIGNIFICANT CHANGE UP (ref 80–100)
MCV RBC AUTO: 92.4 FL — SIGNIFICANT CHANGE UP (ref 80–100)
MONOCYTES # BLD AUTO: 0.99 K/UL — HIGH (ref 0–0.9)
MONOCYTES # BLD AUTO: 0.99 K/UL — HIGH (ref 0–0.9)
MONOCYTES NFR BLD AUTO: 9.2 % — SIGNIFICANT CHANGE UP (ref 2–14)
MONOCYTES NFR BLD AUTO: 9.2 % — SIGNIFICANT CHANGE UP (ref 2–14)
NEUTROPHILS # BLD AUTO: 7.49 K/UL — HIGH (ref 1.8–7.4)
NEUTROPHILS # BLD AUTO: 7.49 K/UL — HIGH (ref 1.8–7.4)
NEUTROPHILS NFR BLD AUTO: 69.4 % — SIGNIFICANT CHANGE UP (ref 43–77)
NEUTROPHILS NFR BLD AUTO: 69.4 % — SIGNIFICANT CHANGE UP (ref 43–77)
PHOSPHATE SERPL-MCNC: 2.9 MG/DL — SIGNIFICANT CHANGE UP (ref 2.5–4.5)
PHOSPHATE SERPL-MCNC: 2.9 MG/DL — SIGNIFICANT CHANGE UP (ref 2.5–4.5)
PLATELET # BLD AUTO: 481 K/UL — HIGH (ref 150–400)
PLATELET # BLD AUTO: 481 K/UL — HIGH (ref 150–400)
POTASSIUM SERPL-MCNC: 4.4 MMOL/L — SIGNIFICANT CHANGE UP (ref 3.5–5.3)
POTASSIUM SERPL-MCNC: 4.4 MMOL/L — SIGNIFICANT CHANGE UP (ref 3.5–5.3)
POTASSIUM SERPL-SCNC: 4.4 MMOL/L — SIGNIFICANT CHANGE UP (ref 3.5–5.3)
POTASSIUM SERPL-SCNC: 4.4 MMOL/L — SIGNIFICANT CHANGE UP (ref 3.5–5.3)
PROT SERPL-MCNC: 6.2 GM/DL — SIGNIFICANT CHANGE UP (ref 6–8.3)
PROT SERPL-MCNC: 6.2 GM/DL — SIGNIFICANT CHANGE UP (ref 6–8.3)
RBC # BLD: 3.83 M/UL — SIGNIFICANT CHANGE UP (ref 3.8–5.2)
RBC # BLD: 3.83 M/UL — SIGNIFICANT CHANGE UP (ref 3.8–5.2)
RBC # FLD: 12.6 % — SIGNIFICANT CHANGE UP (ref 10.3–14.5)
RBC # FLD: 12.6 % — SIGNIFICANT CHANGE UP (ref 10.3–14.5)
SODIUM SERPL-SCNC: 133 MMOL/L — LOW (ref 135–145)
SODIUM SERPL-SCNC: 133 MMOL/L — LOW (ref 135–145)
WBC # BLD: 10.79 K/UL — HIGH (ref 3.8–10.5)
WBC # BLD: 10.79 K/UL — HIGH (ref 3.8–10.5)
WBC # FLD AUTO: 10.79 K/UL — HIGH (ref 3.8–10.5)
WBC # FLD AUTO: 10.79 K/UL — HIGH (ref 3.8–10.5)

## 2023-11-12 PROCEDURE — 99232 SBSQ HOSP IP/OBS MODERATE 35: CPT

## 2023-11-12 RX ORDER — MAGNESIUM HYDROXIDE 400 MG/1
30 TABLET, CHEWABLE ORAL DAILY
Refills: 0 | Status: DISCONTINUED | OUTPATIENT
Start: 2023-11-12 | End: 2023-11-13

## 2023-11-12 RX ORDER — CEFTRIAXONE 500 MG/1
1000 INJECTION, POWDER, FOR SOLUTION INTRAMUSCULAR; INTRAVENOUS ONCE
Refills: 0 | Status: COMPLETED | OUTPATIENT
Start: 2023-11-12 | End: 2023-11-12

## 2023-11-12 RX ADMIN — POLYETHYLENE GLYCOL 3350 17 GRAM(S): 17 POWDER, FOR SOLUTION ORAL at 09:36

## 2023-11-12 RX ADMIN — Medication 100 MILLIGRAM(S): at 09:36

## 2023-11-12 RX ADMIN — QUETIAPINE FUMARATE 12.5 MILLIGRAM(S): 200 TABLET, FILM COATED ORAL at 22:16

## 2023-11-12 RX ADMIN — ENOXAPARIN SODIUM 40 MILLIGRAM(S): 100 INJECTION SUBCUTANEOUS at 13:16

## 2023-11-12 RX ADMIN — ATORVASTATIN CALCIUM 40 MILLIGRAM(S): 80 TABLET, FILM COATED ORAL at 22:15

## 2023-11-12 RX ADMIN — Medication 110 MILLIGRAM(S): at 22:15

## 2023-11-12 RX ADMIN — MAGNESIUM OXIDE 400 MG ORAL TABLET 400 MILLIGRAM(S): 241.3 TABLET ORAL at 09:35

## 2023-11-12 RX ADMIN — Medication 10 MILLIGRAM(S): at 13:16

## 2023-11-12 RX ADMIN — Medication 10 MILLIGRAM(S): at 05:05

## 2023-11-12 RX ADMIN — Medication 110 MILLIGRAM(S): at 09:36

## 2023-11-12 RX ADMIN — INSULIN GLARGINE 10 UNIT(S): 100 INJECTION, SOLUTION SUBCUTANEOUS at 22:15

## 2023-11-12 RX ADMIN — Medication 2000 UNIT(S): at 22:15

## 2023-11-12 RX ADMIN — Medication 5 MILLIGRAM(S): at 09:36

## 2023-11-12 RX ADMIN — CEFTRIAXONE 1000 MILLIGRAM(S): 500 INJECTION, POWDER, FOR SOLUTION INTRAMUSCULAR; INTRAVENOUS at 09:38

## 2023-11-12 RX ADMIN — Medication 10 MILLIGRAM(S): at 22:15

## 2023-11-12 RX ADMIN — Medication 81 MILLIGRAM(S): at 09:35

## 2023-11-12 RX ADMIN — Medication 1: at 08:31

## 2023-11-12 RX ADMIN — CLOPIDOGREL BISULFATE 75 MILLIGRAM(S): 75 TABLET, FILM COATED ORAL at 09:35

## 2023-11-12 RX ADMIN — MAGNESIUM HYDROXIDE 30 MILLILITER(S): 400 TABLET, CHEWABLE ORAL at 16:48

## 2023-11-12 RX ADMIN — Medication 5 MILLIGRAM(S): at 22:15

## 2023-11-12 NOTE — PHARMACOTHERAPY INTERVENTION NOTE - COMMENTS
Recommended (11/7) to change azithromycin to doxycycline 100 mg IV q12h since the QTc interval from 11/6 was 505 ms.    Niurka Hayes, PharmD, [facility]  Clinical Pharmacy Specialist, Infectious Diseases  Tele-Antimicrobial Stewardship Program (Tele-ASP)  Tele-ASP Phone: [phone]  
As discussed, agreed to order ceftriaxone 1000 mg IV x once since patient is to receive 7 days of ceftriaxone therapy.    Niurka Hayes, PharmD, [facility]  Clinical Pharmacy Specialist, Infectious Diseases  Tele-Antimicrobial Stewardship Program (Tele-ASP)  Tele-ASP Phone: [phone]  
As discussed, agreed to discontinue the doxycycline order after the AM dose on 11/13 since patient is to receive 5 days of doxycycline therapy.    Niurka Hayes, PharmD, [facility]  Clinical Pharmacy Specialist, Infectious Diseases  Tele-Antimicrobial Stewardship Program (Tele-ASP)  Tele-ASP Phone: [phone]

## 2023-11-12 NOTE — PROGRESS NOTE ADULT - NUTRITIONAL ASSESSMENT
This patient has been assessed with a concern for Malnutrition and has been determined to have a diagnosis/diagnoses of Severe protein-calorie malnutrition.    This patient is being managed with:   Diet Regular-  Consistent Carbohydrate {Evening Snack} (CSTCHOSN)  Entered: Nov 6 2023  8:12AM  
This patient has been assessed with a concern for Malnutrition and has been determined to have a diagnosis/diagnoses of Severe protein-calorie malnutrition.    This patient is being managed with:   Diet Regular-  Consistent Carbohydrate {Evening Snack} (CSTCHOSN)  Supplement Feeding Modality:  Oral  Ensure Plus High Protein Cans or Servings Per Day:  3       Frequency:  Daily  Entered: Nov 7 2023  6:09PM  
This patient has been assessed with a concern for Malnutrition and has been determined to have a diagnosis/diagnoses of Severe protein-calorie malnutrition.    This patient is being managed with:   Diet Regular-  Consistent Carbohydrate {Evening Snack} (CSTCHOSN)  Entered: Nov 6 2023  8:12AM

## 2023-11-12 NOTE — PROGRESS NOTE ADULT - SUBJECTIVE AND OBJECTIVE BOX
Subjective:  Chief complain : shortness of breath and hypoxia     HPI:      78 F with Hx of Dementia, DM2, h/o CVA, severe AS, carotid artery stenosis s/p stent,  chronic LBBB, h/o remote  breast cancer  has been bedbound for about 2 years since getting COVID in .  She is at baseline knows her name and is oriented to person only.  Hx obtained from speaking to the patient's daughter as patient is unable to provide any significant history.  She lives at home with her daughter and she has an aide.  She has not left the house in 2 years.  She was brought to the ED because of shortness of breath, cough and she had one episode of vomiting.  No reported abdominal pain.  No reported fevers, chills, shakes, diarrhea or dysuria.      In the ED, she was noted to be hypoxic and had a fever to 102 and was tachycardic.  Imaging revealed a concern for multifocal PNA.  She was treated with IV Rocephin, IV Zithromax.  She did become hypotensive in the ED with a SBP in the 80s, and was treated with a total of 2.5L of NS.  BP has improved.  Lactate was normal.  Initial trop was 94 and BNP was 7472.       -    Patient seen and examined at bedside earlier today, confused, lethargic, tolerating some po intake , afebrile   - feels better, denies cp, dyspnea, abdominal pain, afebrile, less confused , tolerating po intake   - afebrile, denies cp, dyspnea, abdominal pain, feels better, tolerating po intake  11/10 - + generalized weakness, denies cp, abdominal pain, poor historian, events noted - urinary retention, + constipation > 3 days   - + constipation, urinary retention improved, denies cp, dyspnea, abdominal pain, feels better, plan discussed    - + bm yesterday pm, + dill , denies cp, dyspnea, abdominal pain, comfortable in the bed    Review of system- Rest of the review of system are negative except mentioned in HPI    Vital sings reviewed for last 24 h  T(C): 36.5 (23 @ 07:24), Max: 36.6 (23 @ 20:27)  T(F): 97.7 (23 @ 07:24), Max: 97.8 (23 @ 20:27)  HR: 88 (23 @ 07:24) (88 - 102)  BP: 110/74 (23 @ 07:24) (102/65 - 110/74)  RR: 18 (23 @ 07:24) (18 - 18)  SpO2: 96% (23 @ 07:24) (96% - 96%)  Wt(kg): --  Daily     Daily Weight in k (2023 10:00)  CAPILLARY BLOOD GLUCOSE      POCT Blood Glucose.: 140 mg/dL (2023 11:49)  POCT Blood Glucose.: 166 mg/dL (2023 07:59)  POCT Blood Glucose.: 152 mg/dL (2023 21:46)  POCT Blood Glucose.: 191 mg/dL (2023 16:51)        Physical exam:   General : NAD, appear to be of stated age , well groomed   NERVOUS SYSTEM:  Alert & Oriented X1, limited exam due to mental status   HEAD:  Atraumatic, Normocephalic  EYES: EOMI, PERRLA, conjunctiva and sclera clear  HEENT: Moist mucous membranes, Supple neck , No JVD  CHEST: Clear to auscultation bilaterally; No rales, no rhonchi, no wheezing  HEART: Regular rate and rhythm; No murmurs, no rubs or gallops  ABDOMEN: Soft, Non-tender, Non-distended; Bowel sounds present, no guarding , no peritoneal irritation   GENITOURINARY- Voiding, no suprapubic tenderness  EXTREMITIES:  2+ Peripheral Pulses, No clubbing, cyanosis,   edema  MUSCULOSKELETAL:- No muscle tenderness, Muscle tone normal, No joint tenderness, no Joint swelling,  Joint ROM –normal   SKIN-no rash, no lesion       Labs radiologic and other test : all reviewed and interpreted :                               11.7   10.79 )-----------( 481      ( 2023 06:09 )             35.4         133<L>  |  101  |  16  ----------------------------<  168<H>  4.4   |  25  |  0.44<L>    Ca    8.5      2023 06:09  Phos  2.9     11-12  Mg     1.9     11-12    TPro  6.2  /  Alb  2.3<L>  /  TBili  0.4  /  DBili  x   /  AST  27  /  ALT  20  /  AlkPhos  64  11-12        LIVER FUNCTIONS - ( 2023 06:09 )  Alb: 2.3 g/dL / Pro: 6.2 gm/dL / ALK PHOS: 64 U/L / ALT: 20 U/L / AST: 27 U/L / GGT: x           Pro-Brain Natriuretic Peptide: 36537 pg/mL (11.10.23 @ 06:52)    Pro-Brain Natriuretic Peptide: 62151 pg/mL (23 @ 07:50)    - TroponinI hsT: <-69364.81, <-51469.28, <-46087.71, <-51581.66, <-54176.44                      CT Abdomen and Pelvis w/ IV Cont (23 @ 01:19) >  IMPRESSION:  No pulmonary embolism.    Diffuse upper lobe predominant patchy airspace opacities with septal   thickening, likely representing multifocal infection in the given   clinical context, superimposed edema is not excluded.    Small-to-moderate bilateral pleural effusions.    3.5 cm heterogeneously enhancing left breast mass with asymmetrically  prominent left axillary lymph node. These findings are highly suspicious   for malignancy with abscess as an alternative though less likely   possibility. Dedicated breast imaging is recommended for further   characterization.      - TroponinI hsT: <-71629.28, <-80830.71, <-31747.66, <-48386.44, <-23879.82    RECENT CULTURES:  Culture - Urine (23 @ 03:02)    Specimen Source: Clean Catch None   Culture Results:   No growth    Culture - Blood (23 @ 00:15)    Specimen Source: .Blood Blood-Peripheral   Culture Results:   No growth at 24 hours              Cardiac testing : reviewed   EKG 12 Lead ECG (23 @ 00:43) >  Ventricular Rate 111 BPM  QTC Calculation(Bazett) 505 ms  Sinus tachycardia  Left atrial enlargement  Left bundle branch block  Abnormal ECG  When compared with ECG of 05-SEP-2021 13:37,  Aberrant conduction is no longer Present        Procedures :     Devices:     Current medications:  acetaminophen     Tablet .. 650 milliGRAM(s) Oral every 6 hours PRN  aspirin  chewable 81 milliGRAM(s) Oral daily  atorvastatin 40 milliGRAM(s) Oral at bedtime  azithromycin  IVPB 500 milliGRAM(s) IV Intermittent every 24 hours  cefTRIAXone Injectable. 1000 milliGRAM(s) IV Push every 24 hours  clopidogrel Tablet 75 milliGRAM(s) Oral daily  dextrose 5%. 1000 milliLiter(s) IV Continuous <Continuous>  dextrose 5%. 1000 milliLiter(s) IV Continuous <Continuous>  dextrose 50% Injectable 25 Gram(s) IV Push once  dextrose 50% Injectable 12.5 Gram(s) IV Push once  dextrose 50% Injectable 25 Gram(s) IV Push once  dextrose Oral Gel 15 Gram(s) Oral once PRN  enoxaparin Injectable 80 milliGRAM(s) SubCutaneous every 12 hours  glucagon  Injectable 1 milliGRAM(s) IntraMuscular once  influenza  Vaccine (HIGH DOSE) 0.7 milliLiter(s) IntraMuscular once  insulin glargine Injectable (LANTUS) 10 Unit(s) SubCutaneous at bedtime  insulin lispro (ADMELOG) corrective regimen sliding scale   SubCutaneous three times a day before meals  insulin lispro (ADMELOG) corrective regimen sliding scale   SubCutaneous at bedtime  ondansetron Injectable 4 milliGRAM(s) IV Push every 6 hours PRN  QUEtiapine 50 milliGRAM(s) Oral at bedtime            
CHIEF COMPLAINT:    HPI:    78 F with Hx of Dementia has been bedbound for about 2 years since getting COVID in 2021.  She is at baseline knows her name and is oriented to person only.  Hx obtained from speaking to the patient's daughter as patient is unable to provide any significant history.  She lives at home with her daughter and she has an aide.  She has not left the house in 2 years.  She was brought to the ED because of shortness of breath, cough and she had one episode of vomiting.  No reported abdominal pain.  No reported fevers, chills, shakes, diarrhea or dysuria.      In the ED, she was noted to be hypoxic and had a fever to 102 and was tachycardic.  Imaging revealed a concern for multifocal PNA.  She was treated with IV Rocephin, IV Zithromax.  She did become hypotensive in the ED with a SBP in the 80s, and was treated with a total of 2.5L of NS.  BP has improved.  Lactate was normal.  Initial trop was 94 and BNP was 7472.    11/7/23: Pt awake, alert and oriented to person.  Denies cp or sob.  Tele: RSR-ST 70's-110, PVC's    PAST MEDICAL HISTORY:  Advanced Dementia  Diabetes Mellitus on Levemir  Hx of old CVA  Chronic LBBB on EKG  Hx of breast cancer many years ago, s/p surgery  COVID PNA in 2021  ECHO from 2021 - EF 60%, severe AS, moderate MS, mild MR    PAST SURGICAL HISTORY:  s/p breast cancer surgery  s/p carotid stent placement    FAMILY HISTORY:   Unable to determine due to patient's mental status at this time (06 Nov 2023 08:16)    Consulted for CHF exacerbation.  Reviewed history w/ daughter - worsening dypsnea  & cough, no LE edema no PND    Reviewed chart.  Pt w/ h/o very severe (critical AS):  AS peak velocities 5.7 m/sec, mean gradient 72 mmHg.  Pt has dementia, poor historian.  Daughter unaware of this diagnosis nor was she aware  of previous evaluation by Dr. Parada in '20.  He discussed w/ pt's  severe AS & conservative v. TAVR.  Later seen by Dr. Rueda in hospital  Pt requested [facility] cardio as consultants for this admit.      PAST MEDICAL AND SURGICAL HISTORY:  PAST MEDICAL & SURGICAL HISTORY:  DM (diabetes mellitus)  type 2, not on meds      Breast cancer      Cerebrovascular accident (CVA)      Dementia      LBBB (left bundle branch block)      Carotid stent occlusion          ALLERGIES:  Allergies    No Known Allergies    Intolerances        SOCIAL HISTORY:  Social History:  no smoking, no drugs, no ETOH (06 Nov 2023 08:16)      FAMILY  HISTORY:  FAMILY HISTORY:  No pertinent family history  of cancer        MEDICATIONS:  OUTPATIENT:  Home Medications:  Seroquel 50 mg oral tablet: 1 tab(s) orally once a day (at bedtime) (06 Nov 2023 08:03)    MEDICATIONS  (STANDING):  aspirin  chewable 81 milliGRAM(s) Oral daily  atorvastatin 40 milliGRAM(s) Oral at bedtime  azithromycin  IVPB 500 milliGRAM(s) IV Intermittent every 24 hours  cefTRIAXone Injectable. 1000 milliGRAM(s) IV Push every 24 hours  clopidogrel Tablet 75 milliGRAM(s) Oral daily  dextrose 5%. 1000 milliLiter(s) (100 mL/Hr) IV Continuous <Continuous>  dextrose 5%. 1000 milliLiter(s) (50 mL/Hr) IV Continuous <Continuous>  dextrose 50% Injectable 25 Gram(s) IV Push once  dextrose 50% Injectable 12.5 Gram(s) IV Push once  dextrose 50% Injectable 25 Gram(s) IV Push once  enoxaparin Injectable 80 milliGRAM(s) SubCutaneous every 12 hours  glucagon  Injectable 1 milliGRAM(s) IntraMuscular once  influenza  Vaccine (HIGH DOSE) 0.7 milliLiter(s) IntraMuscular once  insulin glargine Injectable (LANTUS) 10 Unit(s) SubCutaneous at bedtime  insulin lispro (ADMELOG) corrective regimen sliding scale   SubCutaneous three times a day before meals  insulin lispro (ADMELOG) corrective regimen sliding scale   SubCutaneous at bedtime  QUEtiapine 50 milliGRAM(s) Oral at bedtime    MEDICATIONS  (PRN):  acetaminophen     Tablet .. 650 milliGRAM(s) Oral every 6 hours PRN Mild Pain (1 - 3)  dextrose Oral Gel 15 Gram(s) Oral once PRN Blood Glucose LESS THAN 70 milliGRAM(s)/deciliter  ondansetron Injectable 4 milliGRAM(s) IV Push every 6 hours PRN Nausea and/or Vomiting    Vital Signs Last 24 Hrs  T(C): 36.7 (07 Nov 2023 07:43), Max: 36.7 (07 Nov 2023 07:43)  T(F): 98.1 (07 Nov 2023 07:43), Max: 98.1 (07 Nov 2023 07:43)  HR: 105 (07 Nov 2023 07:43) (105 - 105)  BP: 103/66 (07 Nov 2023 07:43) (103/66 - 103/66)  BP(mean): --  RR: 18 (07 Nov 2023 07:43) (18 - 18)  SpO2: 96% (07 Nov 2023 07:43) (96% - 96%)    Parameters below as of 07 Nov 2023 07:43  Patient On (Oxygen Delivery Method): nasal cannula  O2 Flow (L/min): 1.5    I&O's Summary    05 Nov 2023 07:01  -  06 Nov 2023 07:00  --------------------------------------------------------  IN: 0 mL / OUT: 200 mL / NET: -200 mL        I&O's Detail    05 Nov 2023 07:01  -  06 Nov 2023 07:00  --------------------------------------------------------  IN:  Total IN: 0 mL    OUT:    Intermittent Catheterization - Urethral (mL): 200 mL  Total OUT: 200 mL    Total NET: -200 mL          PHYSICAL EXAM:    Constitutional: NAD, awake  HEENT: PERR, EOMI,   Neck:  supple,  No JVD  Respiratory:  diminished breath sounds  Cardiovascular: S1 and S2, regular rate and rhythm, loud systolic Murmurs no radiation to carotids.  Gastrointestinal: Bowel Sounds present, soft, nontender.   Extremities: No peripheral edema. No clubbing or cyanosis.  Vascular: 2+ peripheral pulses  Neurological: A/O x 3, no focal deficits      ===============================  ===============================  LABS:                           11.2   8.91  )-----------( 268      ( 07 Nov 2023 07:21 )             33.2                           13.9   14.46 )-----------( 333      ( 06 Nov 2023 00:15 )             39.9     11-07    132<L>  |  102  |  18  ----------------------------<  233<H>  4.7   |  24  |  0.52    Ca    8.5      07 Nov 2023 07:21    TPro  6.8  /  Alb  2.7<L>  /  TBili  0.5  /  DBili  x   /  AST  16  /  ALT  11<L>  /  AlkPhos  67  11-06 06 Nov 2023 09:05    132    |  103    |  14     ----------------------------<  241    4.6     |  25     |  0.62   06 Nov 2023 00:15    131    |  99     |  15     ----------------------------<  221    4.3     |  25     |  0.62     Ca    8.3        06 Nov 2023 09:05  Ca    8.6        06 Nov 2023 00:15    TPro  6.8    /  Alb  2.7    /  TBili  0.5    /  DBili  x      /  AST  16     /  ALT  11     /  AlkPhos  67     06 Nov 2023 00:15    PT/INR - ( 06 Nov 2023 00:15 )   PT: 13.1 sec;   INR: 1.16 ratio         PTT - ( 06 Nov 2023 00:15 )  PTT:32.4 sec    THYROID STUDIES:    ===============================  ===============================  CARDIAC BIOMARKERS:  -------  -BNP VALUES:    -------  -TROPONIN VALUES:   Troponin I, High Sensitivity Result: 12,182.82 ng/L *H* (11-06-23 @ 09:05)    Troponin I, High Sensitivity Result: 93.99 ng/L *H* (11-06-23 @ 00:15)    Troponin I, Serum: <0.015 ng/mL [0.015 - 0.045] (02-05-21 @ 16:55)  Troponin I, Serum: <0.015 ng/mL [0.015 - 0.045] (02-05-21 @ 14:00)  Troponin I, Serum: <0.015 ng/mL [0.015 - 0.045] (01-18-21 @ 10:25)        ===============================  ===============================  EKG: NSR LBBB      ==================================  ==================================    EXAM:  ECHO TTE WO CON COMP W DOPP    PROCEDURE DATE:  06/30/2021    INTERPRETATION:  Transhoracic Echocardiography Report (TTE)     Demographics     Patient name         MARIFER BARRAGAN       Age           76 year(s)     Med Rec #  [id number]          Gender        Female     Account #            [id number]       Date of Birth 1945     Interpreting         Valdo Brown MD   Room Number   0532   Physician      Doppler Measurements:     AV Velocity:568 cm/s                  MV Peak E-Wave: 92.1 cm/s   AV Peak Gradient: 129.05 mmHg         MV Peak A-Wave: 178 cm/s   AV Mean Gradient: 72 mmHg             MV E/A Ratio: 0.52 %                                         MV Peak Gradient: 3.39 mmHg     Findings     Mitral Valve   Moderate to severe mitral annular calcification is present.   There is calcification of mitral valve . The leaflet opening is moderately   restricted.   Mean transmitral gradient is 6 mmHg; this finding is consistent with   moderate mitral stenosis.   Mild (1+) mitral regurgitation is present.     Aortic Valve   Significant fibrocalcific changes noted to the aortic valve leaflets with   restriction in leaflet excursion. Peak and mean transaortic gradients are   129 and 72 mmHg respectively; this finding is consistent with severe   aortic stenosis.     Impression     Summary     Concentric left ventricular hypertrophy is present.   Estimated left ventricular ejection fraction is 60 %.   Normal appearing left atrium.  Normal appearing right atrium.   Normal appearing right ventricle structure and function.   ***************Significant fibrocalcific changes noted to the aortic valve leaflets with   *************** restriction in leaflet excursion. Peak and mean transaortic gradients are   *************** 129 and 72 mmHg respectively; this finding is consistent with severe   ***************aortic stenosis.   Moderate to severe mitral annular calcification is present.   There is calcification of mitral valve . The leaflet opening is moderately   restricted.    ***************Mean transmitral gradient is 6 mmHg; this finding is consistent with    ***************moderate mitral stenosis.   Mild (1+) mitral regurgitation is present.   The tricuspid valve is not well visualized, but is probably normal.   No evidence of pericardial effusion.     Signature     ----------------------------------------------------------------   Electronically signed by Valdo Brown MD(Interpreting   physician) on 06/30/2021 02:57 PM   ----------------------------------------------------------------  VALDO BROWN MD; Attending Cardiologist  This document has been electronically signed. Jun 30 2021  2:57PM  =======================  < from: CT Angio Chest PE Protocol w/ IV Cont (11.06.23 @ 01:19) >  ACC: [id number] EXAM:  CT ABDOMEN AND PELVIS IC   ORDERED BY: IWONA BRAXTON     ACC: [id number] EXAM:  CT ANGIO CHEST PULM ART WAWIC   ORDERED BY: IWONA BRAXTON     PROCEDURE DATE:  11/06/2023          INTERPRETATION:  CLINICAL INFORMATION: Hypoxia, shortness of breath,   sepsis    COMPARISON: CT abdomen pelvis 9/5/2021 and prior studies    CONTRAST/COMPLICATIONS:  IV Contrast: Omnipaque 350 (accession [id number]), IV contrast documented   in unlinked concurrent exam (accession [id number])  90 cc administered   10   cc discarded  Oral Contrast: NONE  Complications: None reported at time of study completion    PROCEDURE:  CT Angiography of the Chest was performed followed by portal venous phase   imaging of the Abdomen and Pelvis.  Sagittal and coronal reformats were performed as well as 3D (MIP)   reconstructions.    FINDINGS:  CHEST:  LUNGS AND LARGE AIRWAYS: Patent central airways. Diffuse upper lobe   predominant patchy airspace opacities with septal thickening  PLEURA: Small to moderate bilateral pleural effusions  VESSELS: No pulmonary embolism. Atherosclerotic changes in the aorta and   coronary arteries.  HEART: Heart size is normal. No pericardial effusion. Aortic valve   calcifications. Mitral annular calcifications.  MEDIASTINUM AND CRISTOBAL: No lymphadenopathy.  CHEST WALL AND LOWER NECK: 3.5 x 2.5 cm heterogeneously enhancing left   breast mass, new compared to the most recent prior imaging from 2021.   Asymmetrically prominent left axillary lymph node measuring 1.2 cm short   axis. Dystrophic calcifications in the right breast.    ABDOMEN AND PELVIS:  LIVER: Within normal limits.  BILE DUCTS: Normal caliber.  GALLBLADDER: Cholelithiasis.  SPLEEN: Within normal limits.  PANCREAS: Diffuse fatty atrophy.  ADRENALS: Within normal limits.  KIDNEYS/URETERS: Within normal limits.    BLADDER: Within normal limits.  REPRODUCTIVE ORGANS: Uterus and adnexa within normal limits.    BOWEL: No bowel obstruction. Appendix is normal.  PERITONEUM: No ascites.  VESSELS: Atherosclerotic changes.  RETROPERITONEUM/LYMPH NODES: No lymphadenopathy.  ABDOMINAL WALL: Fat-containing inguinal hernia.  BONES: No acute osseous abnormality.    IMPRESSION:  No pulmonary embolism.    Diffuse upper lobe predominant patchy airspace opacities with septal   thickening, likely representing multifocal infection in the given   clinical context, superimposed edema is not excluded.    Small-to-moderate bilateral pleural effusions.    3.5 cm heterogeneously enhancing left breast mass with asymmetrically  prominent left axillary lymph node. These findings are highly suspicious   for malignancy with abscess as an alternative though less likely   possibility. Dedicated breast imaging is recommended for further   characterization.        --- End of Report ---            RYAN APODACA MD; Attending Radiologist  This document has been electronically signed. Nov 6 2023  2:23AM  =======================  < from: Xray Chest 1 View-PORTABLE IMMEDIATE (11.06.23 @ 00:44) >    ACC: [id number] EXAM:  XR CHEST PORTABLE IMMED 1V   ORDERED BY: IWONA BRAXTON     PROCEDURE DATE:  11/06/2023          INTERPRETATION:  AP chest on November 6, 2023 at 12:32 AM. Patient has   sepsis.    Heart magnified by technique.    On September 5, 2021 there was a consolidated infiltrate in the right   upper lobe and left perihilar infiltrate.    I present examination there is diffuse mild to moderate infiltration in   the left lung and scattered right perihilar infiltration.    IMPRESSION: Bilateral infiltrates left greater than right presently seen.    --- End of Report ---            CHUY CANSECO MD; Attending Radiologist  This document has been electronically signed. Nov 6 2023  8:50AM    < end of copied text >    
HPI: pt seen and examined with no family at bedside, patient appears comfortable at this time, Spoke with patients daughter Soumya who is the main , she is requesting [facility]     PAIN: ( )Yes   (x )No  pt appears comfortable   DYSPNEA: ( ) Yes  (x ) No    PPSV2: 30  %  FAST: unsure of baseline     General: elderly female in bed, NAD   Mental Status: alert to self and place but unable to tell why she came to the hospital   HEENT: dry oral mucosa   Lungs: diminished breath sounds b/l   Cardiac: s1ws2 +   GI: nontender, nondistended, +BS   : +Voiding   Ext: DEY   Neuro: dementia     PHYSICAL EXAM:    Vital Signs Last 24 Hrs  T(C): 36.6 (10 Nov 2023 07:53), Max: 37.1 (2023 20:51)  T(F): 97.8 (10 Nov 2023 07:53), Max: 98.8 (2023 20:51)  HR: 96 (10 Nov 2023 07:53) (96 - 106)  BP: 98/62 (10 Nov 2023 07:53) (98/62 - 104/83)  RR: 18 (10 Nov 2023 07:53) (18 - 18)  SpO2: 98% (10 Nov 2023 07:53) (96% - 98%)    Parameters below as of 10 Nov 2023 07:53  Patient On (Oxygen Delivery Method): room air    Daily Weight in k.3 (10 Nov 2023 06:50)    LABS:                        11.2   9.23  )-----------( 426      ( 10 Nov 2023 06:52 )             33.3         132<L>  |  101  |  17  ----------------------------<  194<H>  4.4   |  27  |  0.37<L>    Ca    8.7      2023 06:16  Phos  2.5     11-10  Mg     1.9     11-10        Albumin: Albumin: 2.4 g/dL (08 @ 07:50)      Allergies    No Known Allergies    Intolerances      MEDICATIONS  (STANDING):  aspirin  chewable 81 milliGRAM(s) Oral daily  atorvastatin 40 milliGRAM(s) Oral at bedtime  bisacodyl 5 milliGRAM(s) Oral every 12 hours  cefTRIAXone Injectable. 1000 milliGRAM(s) IV Push every 24 hours  cholecalciferol 2000 Unit(s) Oral at bedtime  clopidogrel Tablet 75 milliGRAM(s) Oral daily  dextrose 5%. 1000 milliLiter(s) (100 mL/Hr) IV Continuous <Continuous>  dextrose 5%. 1000 milliLiter(s) (50 mL/Hr) IV Continuous <Continuous>  dextrose 50% Injectable 25 Gram(s) IV Push once  dextrose 50% Injectable 25 Gram(s) IV Push once  dextrose 50% Injectable 12.5 Gram(s) IV Push once  doxycycline IVPB 100 milliGRAM(s) IV Intermittent every 12 hours  enoxaparin Injectable 40 milliGRAM(s) SubCutaneous every 24 hours  glucagon  Injectable 1 milliGRAM(s) IntraMuscular once  influenza  Vaccine (HIGH DOSE) 0.7 milliLiter(s) IntraMuscular once  insulin glargine Injectable (LANTUS) 10 Unit(s) SubCutaneous at bedtime  insulin lispro (ADMELOG) corrective regimen sliding scale   SubCutaneous at bedtime  insulin lispro (ADMELOG) corrective regimen sliding scale   SubCutaneous three times a day before meals  magnesium oxide 400 milliGRAM(s) Oral daily  polyethylene glycol 3350 17 Gram(s) Oral daily  QUEtiapine 12.5 milliGRAM(s) Oral at bedtime  thiamine Injectable 100 milliGRAM(s) IV Push daily    MEDICATIONS  (PRN):  acetaminophen     Tablet .. 650 milliGRAM(s) Oral every 6 hours PRN Mild Pain (1 - 3)  dextrose Oral Gel 15 Gram(s) Oral once PRN Blood Glucose LESS THAN 70 milliGRAM(s)/deciliter  ondansetron Injectable 4 milliGRAM(s) IV Push every 6 hours PRN Nausea and/or Vomiting  QUEtiapine 12.5 milliGRAM(s) Oral every 12 hours PRN agitation          
Subjective:  Chief complain : shortness of breath and hypoxia     HPI:      78 F with Hx of Dementia, DM2, h/o CVA, severe AS, carotid artery stenosis s/p stent,  chronic LBBB, h/o remote  breast cancer  has been bedbound for about 2 years since getting COVID in .  She is at baseline knows her name and is oriented to person only.  Hx obtained from speaking to the patient's daughter as patient is unable to provide any significant history.  She lives at home with her daughter and she has an aide.  She has not left the house in 2 years.  She was brought to the ED because of shortness of breath, cough and she had one episode of vomiting.  No reported abdominal pain.  No reported fevers, chills, shakes, diarrhea or dysuria.      In the ED, she was noted to be hypoxic and had a fever to 102 and was tachycardic.  Imaging revealed a concern for multifocal PNA.  She was treated with IV Rocephin, IV Zithromax.  She did become hypotensive in the ED with a SBP in the 80s, and was treated with a total of 2.5L of NS.  BP has improved.  Lactate was normal.  Initial trop was 94 and BNP was 7472.       -    Patient seen and examined at bedside earlier today, confused, lethargic, tolerating some po intake , afebrile   - feels better, denies cp, dyspnea, abdominal pain, afebrile, less confused , tolerating po intake    Review of system- Rest of the review of system are negative except mentioned in HPI    Vital sings reviewed for last 24 h  T(C): 36.3 (23 @ 08:04), Max: 36.3 (23 @ 20:36)  T(F): 97.4 (23 @ 08:04), Max: 97.4 (23 @ 08:04)  HR: 86 (23 @ 08:04) (86 - 114)  BP: 112/74 (23 @ 08:04) (94/66 - 112/74)  RR: 18 (23 @ 08:04) (18 - 18)  SpO2: 97% (23 @ 08:04) (92% - 97%)  Wt(kg): --  Daily     Daily Weight in k.8 (2023 06:35)  CAPILLARY BLOOD GLUCOSE      POCT Blood Glucose.: 197 mg/dL (2023 16:59)  POCT Blood Glucose.: 203 mg/dL (2023 11:33)  POCT Blood Glucose.: 245 mg/dL (2023 07:50)  POCT Blood Glucose.: 221 mg/dL (2023 21:25)      Physical exam:   General : NAD, appear to be of stated age , well groomed   NERVOUS SYSTEM:  Alert & Oriented X3, non- focal exam, Motor Strength 5/5 B/L upper and lower extremities; DTRs 2+ intact and symmetric   HEAD:  Atraumatic, Normocephalic  EYES: EOMI, PERRLA, conjunctiva and sclera clear  HEENT: Moist mucous membranes, Supple neck , No JVD  CHEST: Clear to auscultation bilaterally; No rales, no rhonchi, no wheezing  HEART: Regular rate and rhythm; No murmurs, no rubs or gallops  ABDOMEN: Soft, Non-tender, Non-distended; Bowel sounds present, no guarding , no peritoneal irritation   GENITOURINARY- Voiding, no suprapubic tenderness  EXTREMITIES:  2+ Peripheral Pulses, No clubbing, cyanosis,   edema  MUSCULOSKELETAL:- No muscle tenderness, Muscle tone normal, No joint tenderness, no Joint swelling,  Joint ROM –normal   SKIN-no rash, no lesion    Labs radiologic and other test : all reviewed and interpreted :                         11.2   9.74  )-----------( 300      ( 2023 07:50 )             33.3     11-08    130<L>  |  100  |  18  ----------------------------<  244<H>  4.7   |  22  |  0.60    Ca    8.8      2023 07:50  Phos  2.5     11-08  Mg     2.0     11-08    TPro  6.4  /  Alb  2.4<L>  /  TBili  0.4  /  DBili  x   /  AST  114<H>  /  ALT  24  /  AlkPhos  57  11-08    CARDIAC MARKERS ( 2023 07:50 )  x     / x     / 377 U/L / x     / x          LIVER FUNCTIONS - ( 2023 07:50 )  Alb: 2.4 g/dL / Pro: 6.4 gm/dL / ALK PHOS: 57 U/L / ALT: 24 U/L / AST: 114 U/L / GGT: x                 CT Abdomen and Pelvis w/ IV Cont (23 @ 01:19) >  IMPRESSION:  No pulmonary embolism.    Diffuse upper lobe predominant patchy airspace opacities with septal   thickening, likely representing multifocal infection in the given   clinical context, superimposed edema is not excluded.    Small-to-moderate bilateral pleural effusions.    3.5 cm heterogeneously enhancing left breast mass with asymmetrically  prominent left axillary lymph node. These findings are highly suspicious   for malignancy with abscess as an alternative though less likely   possibility. Dedicated breast imaging is recommended for further   characterization.          RECENT CULTURES:  Culture - Urine (23 @ 03:02)    Specimen Source: Clean Catch None   Culture Results:   No growth    Culture - Blood (23 @ 00:15)    Specimen Source: .Blood Blood-Peripheral   Culture Results:   No growth at 24 hours          Cardiac testing : reviewed   EKG 12 Lead ECG (23 @ 00:43) >  Ventricular Rate 111 BPM  QTC Calculation(Bazett) 505 ms  Sinus tachycardia  Left atrial enlargement  Left bundle branch block  Abnormal ECG  When compared with ECG of 05-SEP-2021 13:37,  Aberrant conduction is no longer Present        Procedures :     Devices:     Current medications:  acetaminophen     Tablet .. 650 milliGRAM(s) Oral every 6 hours PRN  aspirin  chewable 81 milliGRAM(s) Oral daily  atorvastatin 40 milliGRAM(s) Oral at bedtime  azithromycin  IVPB 500 milliGRAM(s) IV Intermittent every 24 hours  cefTRIAXone Injectable. 1000 milliGRAM(s) IV Push every 24 hours  clopidogrel Tablet 75 milliGRAM(s) Oral daily  dextrose 5%. 1000 milliLiter(s) IV Continuous <Continuous>  dextrose 5%. 1000 milliLiter(s) IV Continuous <Continuous>  dextrose 50% Injectable 25 Gram(s) IV Push once  dextrose 50% Injectable 12.5 Gram(s) IV Push once  dextrose 50% Injectable 25 Gram(s) IV Push once  dextrose Oral Gel 15 Gram(s) Oral once PRN  enoxaparin Injectable 80 milliGRAM(s) SubCutaneous every 12 hours  glucagon  Injectable 1 milliGRAM(s) IntraMuscular once  influenza  Vaccine (HIGH DOSE) 0.7 milliLiter(s) IntraMuscular once  insulin glargine Injectable (LANTUS) 10 Unit(s) SubCutaneous at bedtime  insulin lispro (ADMELOG) corrective regimen sliding scale   SubCutaneous three times a day before meals  insulin lispro (ADMELOG) corrective regimen sliding scale   SubCutaneous at bedtime  ondansetron Injectable 4 milliGRAM(s) IV Push every 6 hours PRN  QUEtiapine 50 milliGRAM(s) Oral at bedtime            
Subjective:  Chief complain : shortness of breath and hypoxia     HPI:      78 F with Hx of Dementia, DM2, h/o CVA, severe AS, carotid artery stenosis s/p stent,  chronic LBBB, h/o remote  breast cancer  has been bedbound for about 2 years since getting COVID in .  She is at baseline knows her name and is oriented to person only.  Hx obtained from speaking to the patient's daughter as patient is unable to provide any significant history.  She lives at home with her daughter and she has an aide.  She has not left the house in 2 years.  She was brought to the ED because of shortness of breath, cough and she had one episode of vomiting.  No reported abdominal pain.  No reported fevers, chills, shakes, diarrhea or dysuria.      In the ED, she was noted to be hypoxic and had a fever to 102 and was tachycardic.  Imaging revealed a concern for multifocal PNA.  She was treated with IV Rocephin, IV Zithromax.  She did become hypotensive in the ED with a SBP in the 80s, and was treated with a total of 2.5L of NS.  BP has improved.  Lactate was normal.  Initial trop was 94 and BNP was 7472.       -    Patient seen and examined at bedside earlier today, confused, lethargic, tolerating some po intake , afebrile   - feels better, denies cp, dyspnea, abdominal pain, afebrile, less confused , tolerating po intake   - afebrile, denies cp, dyspnea, abdominal pain, feels better, tolerating po intake  11/10 - + generalized weakness, denies cp, abdominal pain, poor historian, events noted - urinary retention, + constipation > 3 days   - + constipation, urinary retention improved, denies cp, dyspnea, abdominal pain, feels better, plan discussed     Review of system- Rest of the review of system are negative except mentioned in HPI    Vital sings reviewed for last 24 h  T(C): 36.3 (23 @ 07:42), Max: 36.8 (11-10-23 @ 20:10)  T(F): 97.4 (23 @ 07:42), Max: 98.2 (11-10-23 @ 20:10)  HR: 91 (23 @ 07:42) (87 - 91)  BP: 116/69 (23 @ 07:42) (106/72 - 116/69)  RR: 18 (23 @ 07:42) (17 - 18)  SpO2: 92% (23 @ 07:42) (92% - 92%)  Wt(kg): --  Daily     Daily Weight in k.5 (2023 07:01)  CAPILLARY BLOOD GLUCOSE      POCT Blood Glucose.: 172 mg/dL (2023 11:55)  POCT Blood Glucose.: 183 mg/dL (2023 08:07)  POCT Blood Glucose.: 201 mg/dL (10 Nov 2023 22:01)  POCT Blood Glucose.: 207 mg/dL (10 Nov 2023 17:09)      Physical exam:   General : NAD, appear to be of stated age , well groomed   NERVOUS SYSTEM:  Alert & Oriented X1, limited exam due to mental status   HEAD:  Atraumatic, Normocephalic  EYES: EOMI, PERRLA, conjunctiva and sclera clear  HEENT: Moist mucous membranes, Supple neck , No JVD  CHEST: Clear to auscultation bilaterally; No rales, no rhonchi, no wheezing  HEART: Regular rate and rhythm; No murmurs, no rubs or gallops  ABDOMEN: Soft, Non-tender, Non-distended; Bowel sounds present, no guarding , no peritoneal irritation   GENITOURINARY- Voiding, no suprapubic tenderness  EXTREMITIES:  2+ Peripheral Pulses, No clubbing, cyanosis,   edema  MUSCULOSKELETAL:- No muscle tenderness, Muscle tone normal, No joint tenderness, no Joint swelling,  Joint ROM –normal   SKIN-no rash, no lesion       Labs radiologic and other test : all reviewed and interpreted :                               11.4   10.40 )-----------( 450      ( 2023 06:27 )             33.3     11-11    133<L>  |  100  |  13  ----------------------------<  181<H>  4.0   |  25  |  0.41<L>    Ca    8.7      2023 06:27  Phos  2.9     11-11  Mg     1.9     11-11      CARDIAC MARKERS ( 10 Nov 2023 06:52 )  x     / x     / 93 U/L / x     / x                            CT Abdomen and Pelvis w/ IV Cont (23 @ 01:19) >  IMPRESSION:  No pulmonary embolism.    Diffuse upper lobe predominant patchy airspace opacities with septal   thickening, likely representing multifocal infection in the given   clinical context, superimposed edema is not excluded.    Small-to-moderate bilateral pleural effusions.    3.5 cm heterogeneously enhancing left breast mass with asymmetrically  prominent left axillary lymph node. These findings are highly suspicious   for malignancy with abscess as an alternative though less likely   possibility. Dedicated breast imaging is recommended for further   characterization.      - TroponinI hsT: <-79458.28, <-14948.71, <-98855.66, <-69573.44, <-94880.82    RECENT CULTURES:  Culture - Urine (23 @ 03:02)    Specimen Source: Clean Catch None   Culture Results:   No growth    Culture - Blood (23 @ 00:15)    Specimen Source: .Blood Blood-Peripheral   Culture Results:   No growth at 24 hours              Cardiac testing : reviewed   EKG 12 Lead ECG (23 @ 00:43) >  Ventricular Rate 111 BPM  QTC Calculation(Bazett) 505 ms  Sinus tachycardia  Left atrial enlargement  Left bundle branch block  Abnormal ECG  When compared with ECG of 05-SEP-2021 13:37,  Aberrant conduction is no longer Present        Procedures :     Devices:     Current medications:  acetaminophen     Tablet .. 650 milliGRAM(s) Oral every 6 hours PRN  aspirin  chewable 81 milliGRAM(s) Oral daily  atorvastatin 40 milliGRAM(s) Oral at bedtime  azithromycin  IVPB 500 milliGRAM(s) IV Intermittent every 24 hours  cefTRIAXone Injectable. 1000 milliGRAM(s) IV Push every 24 hours  clopidogrel Tablet 75 milliGRAM(s) Oral daily  dextrose 5%. 1000 milliLiter(s) IV Continuous <Continuous>  dextrose 5%. 1000 milliLiter(s) IV Continuous <Continuous>  dextrose 50% Injectable 25 Gram(s) IV Push once  dextrose 50% Injectable 12.5 Gram(s) IV Push once  dextrose 50% Injectable 25 Gram(s) IV Push once  dextrose Oral Gel 15 Gram(s) Oral once PRN  enoxaparin Injectable 80 milliGRAM(s) SubCutaneous every 12 hours  glucagon  Injectable 1 milliGRAM(s) IntraMuscular once  influenza  Vaccine (HIGH DOSE) 0.7 milliLiter(s) IntraMuscular once  insulin glargine Injectable (LANTUS) 10 Unit(s) SubCutaneous at bedtime  insulin lispro (ADMELOG) corrective regimen sliding scale   SubCutaneous three times a day before meals  insulin lispro (ADMELOG) corrective regimen sliding scale   SubCutaneous at bedtime  ondansetron Injectable 4 milliGRAM(s) IV Push every 6 hours PRN  QUEtiapine 50 milliGRAM(s) Oral at bedtime            
Subjective:  Chief complain : shortness of breath and hypoxia     HPI:      78 F with Hx of Dementia, DM2, h/o CVA, severe AS, carotid artery stenosis s/p stent,  chronic LBBB, h/o remote  breast cancer  has been bedbound for about 2 years since getting COVID in 2021.  She is at baseline knows her name and is oriented to person only.  Hx obtained from speaking to the patient's daughter as patient is unable to provide any significant history.  She lives at home with her daughter and she has an aide.  She has not left the house in 2 years.  She was brought to the ED because of shortness of breath, cough and she had one episode of vomiting.  No reported abdominal pain.  No reported fevers, chills, shakes, diarrhea or dysuria.      In the ED, she was noted to be hypoxic and had a fever to 102 and was tachycardic.  Imaging revealed a concern for multifocal PNA.  She was treated with IV Rocephin, IV Zithromax.  She did become hypotensive in the ED with a SBP in the 80s, and was treated with a total of 2.5L of NS.  BP has improved.  Lactate was normal.  Initial trop was 94 and BNP was 7472.      11/7 -    Patient seen and examined at bedside earlier today, confused, lethargic, tolerating some po intake , afebrile  11/8 - feels better, denies cp, dyspnea, abdominal pain, afebrile, less confused , tolerating po intake  11/9 - afebrile, denies cp, dyspnea, abdominal pain, feels better, tolerating po intake  11/10 - + generalized weakness, denies cp, abdominal pain, poor historian, events noted - urinary retention, + constipation > 3 days    Review of system- Rest of the review of system are negative except mentioned in HPI    Vital sings reviewed for last 24 h  T(C): 36.6 (11-10-23 @ 07:53), Max: 37.1 (11-09-23 @ 20:51)  T(F): 97.8 (11-10-23 @ 07:53), Max: 98.8 (11-09-23 @ 20:51)  HR: 96 (11-10-23 @ 07:53) (96 - 106)  BP: 98/62 (11-10-23 @ 07:53) (98/62 - 104/83)  RR: 18 (11-10-23 @ 07:53) (18 - 18)  SpO2: 98% (11-10-23 @ 07:53) (96% - 98%)  CAPILLARY BLOOD GLUCOSE    POCT Blood Glucose.: 207 mg/dL (10 Nov 2023 17:09)  POCT Blood Glucose.: 157 mg/dL (10 Nov 2023 12:25)  POCT Blood Glucose.: 153 mg/dL (10 Nov 2023 08:40)  POCT Blood Glucose.: 233 mg/dL (09 Nov 2023 21:04)    Physical exam:   General : NAD, appear to be of stated age , well groomed   NERVOUS SYSTEM:  Alert & Oriented X1, limited exam due to mental status   HEAD:  Atraumatic, Normocephalic  EYES: EOMI, PERRLA, conjunctiva and sclera clear  HEENT: Moist mucous membranes, Supple neck , No JVD  CHEST: Clear to auscultation bilaterally; No rales, no rhonchi, no wheezing  HEART: Regular rate and rhythm; No murmurs, no rubs or gallops  ABDOMEN: Soft, Non-tender, Non-distended; Bowel sounds present, no guarding , no peritoneal irritation   GENITOURINARY- Voiding, no suprapubic tenderness  EXTREMITIES:  2+ Peripheral Pulses, No clubbing, cyanosis,   edema  MUSCULOSKELETAL:- No muscle tenderness, Muscle tone normal, No joint tenderness, no Joint swelling,  Joint ROM –normal   SKIN-no rash, no lesion       Labs radiologic and other test : all reviewed and interpreted :                    11.2 9.23  )-----------( 426      ( 10 Nov 2023 06:52 )             33.3     11-09    132<L>  |  101  |  17  ----------------------------<  194<H>  4.4   |  27  |  0.37<L>    Ca    8.7      09 Nov 2023 06:16  Phos  2.5     11-10  Mg     1.9     11-10      CARDIAC MARKERS ( 10 Nov 2023 06:52 )  x     / x     / 93 U/L / x     / x                          CT Abdomen and Pelvis w/ IV Cont (11.06.23 @ 01:19) >  IMPRESSION:  No pulmonary embolism.    Diffuse upper lobe predominant patchy airspace opacities with septal   thickening, likely representing multifocal infection in the given   clinical context, superimposed edema is not excluded.    Small-to-moderate bilateral pleural effusions.    3.5 cm heterogeneously enhancing left breast mass with asymmetrically  prominent left axillary lymph node. These findings are highly suspicious   for malignancy with abscess as an alternative though less likely   possibility. Dedicated breast imaging is recommended for further   characterization.      - TroponinI hsT: <-01528.28, <-70388.71, <-29526.66, <-05081.44, <-03772.82    RECENT CULTURES:  Culture - Urine (11.06.23 @ 03:02)    Specimen Source: Clean Catch None   Culture Results:   No growth    Culture - Blood (11.06.23 @ 00:15)    Specimen Source: .Blood Blood-Peripheral   Culture Results:   No growth at 24 hours              Cardiac testing : reviewed   EKG 12 Lead ECG (11.06.23 @ 00:43) >  Ventricular Rate 111 BPM  QTC Calculation(Bazett) 505 ms  Sinus tachycardia  Left atrial enlargement  Left bundle branch block  Abnormal ECG  When compared with ECG of 05-SEP-2021 13:37,  Aberrant conduction is no longer Present        Procedures :     Devices:     Current medications:  acetaminophen     Tablet .. 650 milliGRAM(s) Oral every 6 hours PRN  aspirin  chewable 81 milliGRAM(s) Oral daily  atorvastatin 40 milliGRAM(s) Oral at bedtime  azithromycin  IVPB 500 milliGRAM(s) IV Intermittent every 24 hours  cefTRIAXone Injectable. 1000 milliGRAM(s) IV Push every 24 hours  clopidogrel Tablet 75 milliGRAM(s) Oral daily  dextrose 5%. 1000 milliLiter(s) IV Continuous <Continuous>  dextrose 5%. 1000 milliLiter(s) IV Continuous <Continuous>  dextrose 50% Injectable 25 Gram(s) IV Push once  dextrose 50% Injectable 12.5 Gram(s) IV Push once  dextrose 50% Injectable 25 Gram(s) IV Push once  dextrose Oral Gel 15 Gram(s) Oral once PRN  enoxaparin Injectable 80 milliGRAM(s) SubCutaneous every 12 hours  glucagon  Injectable 1 milliGRAM(s) IntraMuscular once  influenza  Vaccine (HIGH DOSE) 0.7 milliLiter(s) IntraMuscular once  insulin glargine Injectable (LANTUS) 10 Unit(s) SubCutaneous at bedtime  insulin lispro (ADMELOG) corrective regimen sliding scale   SubCutaneous three times a day before meals  insulin lispro (ADMELOG) corrective regimen sliding scale   SubCutaneous at bedtime  ondansetron Injectable 4 milliGRAM(s) IV Push every 6 hours PRN  QUEtiapine 50 milliGRAM(s) Oral at bedtime            
Subjective:  Chief complain : shortness of breath and hypoxia     HPI:      78 F with Hx of Dementia, DM2, h/o CVA, severe AS, carotid artery stenosis s/p stent,  chronic LBBB, h/o remote  breast cancer  has been bedbound for about 2 years since getting COVID in .  She is at baseline knows her name and is oriented to person only.  Hx obtained from speaking to the patient's daughter as patient is unable to provide any significant history.  She lives at home with her daughter and she has an aide.  She has not left the house in 2 years.  She was brought to the ED because of shortness of breath, cough and she had one episode of vomiting.  No reported abdominal pain.  No reported fevers, chills, shakes, diarrhea or dysuria.      In the ED, she was noted to be hypoxic and had a fever to 102 and was tachycardic.  Imaging revealed a concern for multifocal PNA.  She was treated with IV Rocephin, IV Zithromax.  She did become hypotensive in the ED with a SBP in the 80s, and was treated with a total of 2.5L of NS.  BP has improved.  Lactate was normal.  Initial trop was 94 and BNP was 7472.       -    Patient seen and examined at bedside earlier today, confused, lethargic, tolerating some po intake , afebrile   - feels better, denies cp, dyspnea, abdominal pain, afebrile, less confused , tolerating po intake   - afebrile, denies cp, dyspnea, abdominal pain, feels better, tolerating po intake    Review of system- Rest of the review of system are negative except mentioned in HPI    Vital sings reviewed for last 24 h  T(C): 36.7 (23 @ 09:00), Max: 36.8 (23 @ 20:38)  T(F): 98.1 (23 @ 09:00), Max: 98.2 (23 @ 20:38)  HR: 94 (23 @ 09:00) (94 - 104)  BP: 112/75 (23 @ 09:00) (85/43 - 112/75)  RR: 18 (23 @ 09:00) (18 - 18)  SpO2: 100% (23 @ 09:00) (95% - 100%)  Wt(kg): --  Daily     Daily Weight in k.4 (2023 07:02)  CAPILLARY BLOOD GLUCOSE      POCT Blood Glucose.: 280 mg/dL (2023 16:51)  POCT Blood Glucose.: 234 mg/dL (2023 11:59)  POCT Blood Glucose.: 166 mg/dL (2023 07:36)  POCT Blood Glucose.: 205 mg/dL (2023 21:08)        Physical exam:   General : NAD, appear to be of stated age , well groomed   NERVOUS SYSTEM:  Alert & Oriented X1, limited exam due to mental status   HEAD:  Atraumatic, Normocephalic  EYES: EOMI, PERRLA, conjunctiva and sclera clear  HEENT: Moist mucous membranes, Supple neck , No JVD  CHEST: Clear to auscultation bilaterally; No rales, no rhonchi, no wheezing  HEART: Regular rate and rhythm; No murmurs, no rubs or gallops  ABDOMEN: Soft, Non-tender, Non-distended; Bowel sounds present, no guarding , no peritoneal irritation   GENITOURINARY- Voiding, no suprapubic tenderness  EXTREMITIES:  2+ Peripheral Pulses, No clubbing, cyanosis,   edema  MUSCULOSKELETAL:- No muscle tenderness, Muscle tone normal, No joint tenderness, no Joint swelling,  Joint ROM –normal   SKIN-no rash, no lesion    Labs radiologic and other test : all reviewed and interpreted :                    10.9   8.86  )-----------( 344      ( 2023 06:16 )             33.0         132<L>  |  101  |  17  ----------------------------<  194<H>  4.4   |  27  |  0.37<L>    Ca    8.7      2023 06:16  Phos  2.2       Mg     1.8         TPro  6.4  /  Alb  2.4<L>  /  TBili  0.4  /  DBili  x   /  AST  114<H>  /  ALT  24  /  AlkPhos  57      CARDIAC MARKERS ( 2023 07:50 )  x     / x     / 377 U/L / x     / x          LIVER FUNCTIONS - ( 2023 07:50 )  Alb: 2.4 g/dL / Pro: 6.4 gm/dL / ALK PHOS: 57 U/L / ALT: 24 U/L / AST: 114 U/L / GGT: x                   CT Abdomen and Pelvis w/ IV Cont (23 @ 01:19) >  IMPRESSION:  No pulmonary embolism.    Diffuse upper lobe predominant patchy airspace opacities with septal   thickening, likely representing multifocal infection in the given   clinical context, superimposed edema is not excluded.    Small-to-moderate bilateral pleural effusions.    3.5 cm heterogeneously enhancing left breast mass with asymmetrically  prominent left axillary lymph node. These findings are highly suspicious   for malignancy with abscess as an alternative though less likely   possibility. Dedicated breast imaging is recommended for further   characterization.      - TroponinI hsT: <-96287.28, <-31608.71, <-74974.66, <-85412.44, <-22165.82    RECENT CULTURES:  Culture - Urine (23 @ 03:02)    Specimen Source: Clean Catch None   Culture Results:   No growth    Culture - Blood (23 @ 00:15)    Specimen Source: .Blood Blood-Peripheral   Culture Results:   No growth at 24 hours              Cardiac testing : reviewed   EKG 12 Lead ECG (23 @ 00:43) >  Ventricular Rate 111 BPM  QTC Calculation(Bazett) 505 ms  Sinus tachycardia  Left atrial enlargement  Left bundle branch block  Abnormal ECG  When compared with ECG of 05-SEP-2021 13:37,  Aberrant conduction is no longer Present        Procedures :     Devices:     Current medications:  acetaminophen     Tablet .. 650 milliGRAM(s) Oral every 6 hours PRN  aspirin  chewable 81 milliGRAM(s) Oral daily  atorvastatin 40 milliGRAM(s) Oral at bedtime  azithromycin  IVPB 500 milliGRAM(s) IV Intermittent every 24 hours  cefTRIAXone Injectable. 1000 milliGRAM(s) IV Push every 24 hours  clopidogrel Tablet 75 milliGRAM(s) Oral daily  dextrose 5%. 1000 milliLiter(s) IV Continuous <Continuous>  dextrose 5%. 1000 milliLiter(s) IV Continuous <Continuous>  dextrose 50% Injectable 25 Gram(s) IV Push once  dextrose 50% Injectable 12.5 Gram(s) IV Push once  dextrose 50% Injectable 25 Gram(s) IV Push once  dextrose Oral Gel 15 Gram(s) Oral once PRN  enoxaparin Injectable 80 milliGRAM(s) SubCutaneous every 12 hours  glucagon  Injectable 1 milliGRAM(s) IntraMuscular once  influenza  Vaccine (HIGH DOSE) 0.7 milliLiter(s) IntraMuscular once  insulin glargine Injectable (LANTUS) 10 Unit(s) SubCutaneous at bedtime  insulin lispro (ADMELOG) corrective regimen sliding scale   SubCutaneous three times a day before meals  insulin lispro (ADMELOG) corrective regimen sliding scale   SubCutaneous at bedtime  ondansetron Injectable 4 milliGRAM(s) IV Push every 6 hours PRN  QUEtiapine 50 milliGRAM(s) Oral at bedtime            
Subjective:  Chief complain : shortness of breath and hypoxia     HPI:      78 F with Hx of Dementia, DM2, h/o CVA, severe AS, carotid artery stenosis s/p stent,  chronic LBBB, h/o remote  breast cancer  has been bedbound for about 2 years since getting COVID in 2021.  She is at baseline knows her name and is oriented to person only.  Hx obtained from speaking to the patient's daughter as patient is unable to provide any significant history.  She lives at home with her daughter and she has an aide.  She has not left the house in 2 years.  She was brought to the ED because of shortness of breath, cough and she had one episode of vomiting.  No reported abdominal pain.  No reported fevers, chills, shakes, diarrhea or dysuria.      In the ED, she was noted to be hypoxic and had a fever to 102 and was tachycardic.  Imaging revealed a concern for multifocal PNA.  She was treated with IV Rocephin, IV Zithromax.  She did become hypotensive in the ED with a SBP in the 80s, and was treated with a total of 2.5L of NS.  BP has improved.  Lactate was normal.  Initial trop was 94 and BNP was 7472.      11/7 -    Patient seen and examined at bedside earlier today, confused, lethargic, tolerating some po intake , afebrile    Review of system- Rest of the review of system are negative except mentioned in HPI    Vital sings reviewed for last 24 h  T(C): 36.7 (11-07-23 @ 07:43), Max: 36.7 (11-07-23 @ 07:43)  HR: 105 (11-07-23 @ 07:43) (105 - 105)  BP: 103/66 (11-07-23 @ 07:43) (103/66 - 103/66)  RR: 18 (11-07-23 @ 07:43) (18 - 18)  SpO2: 96% (11-07-23 @ 07:43) (96% - 96%)  CAPILLARY BLOOD GLUCOSE      POCT Blood Glucose.: 226 mg/dL (07 Nov 2023 16:52)  POCT Blood Glucose.: 212 mg/dL (07 Nov 2023 11:36)  POCT Blood Glucose.: 183 mg/dL (07 Nov 2023 08:29)  POCT Blood Glucose.: 207 mg/dL (06 Nov 2023 20:44)    Physical exam:   General : NAD, appear to be of stated age , well groomed   NERVOUS SYSTEM:  Alert & Oriented X3, non- focal exam, Motor Strength 5/5 B/L upper and lower extremities; DTRs 2+ intact and symmetric   HEAD:  Atraumatic, Normocephalic  EYES: EOMI, PERRLA, conjunctiva and sclera clear  HEENT: Moist mucous membranes, Supple neck , No JVD  CHEST: Clear to auscultation bilaterally; No rales, no rhonchi, no wheezing  HEART: Regular rate and rhythm; No murmurs, no rubs or gallops  ABDOMEN: Soft, Non-tender, Non-distended; Bowel sounds present, no guarding , no peritoneal irritation   GENITOURINARY- Voiding, no suprapubic tenderness  EXTREMITIES:  2+ Peripheral Pulses, No clubbing, cyanosis,   edema  MUSCULOSKELETAL:- No muscle tenderness, Muscle tone normal, No joint tenderness, no Joint swelling,  Joint ROM –normal   SKIN-no rash, no lesion    Labs radiologic and other test : all reviewed and interpreted :                         11.2   8.91  )-----------( 268      ( 07 Nov 2023 07:21 )             33.2     11-07    132<L>  |  102  |  18  ----------------------------<  233<H>  4.7   |  24  |  0.52    Ca    8.5      07 Nov 2023 07:21    TPro  6.8  /  Alb  2.7<L>  /  TBili  0.5  /  DBili  x   /  AST  16  /  ALT  11<L>  /  AlkPhos  67  11-06    PT/INR - ( 06 Nov 2023 00:15 )   PT: 13.1 sec;   INR: 1.16 ratio         PTT - ( 06 Nov 2023 00:15 )  PTT:32.4 sec      CT Abdomen and Pelvis w/ IV Cont (11.06.23 @ 01:19) >  IMPRESSION:  No pulmonary embolism.    Diffuse upper lobe predominant patchy airspace opacities with septal   thickening, likely representing multifocal infection in the given   clinical context, superimposed edema is not excluded.    Small-to-moderate bilateral pleural effusions.    3.5 cm heterogeneously enhancing left breast mass with asymmetrically  prominent left axillary lymph node. These findings are highly suspicious   for malignancy with abscess as an alternative though less likely   possibility. Dedicated breast imaging is recommended for further   characterization.          RECENT CULTURES:  Culture - Urine (11.06.23 @ 03:02)    Specimen Source: Clean Catch None   Culture Results:   No growth    Culture - Blood (11.06.23 @ 00:15)    Specimen Source: .Blood Blood-Peripheral   Culture Results:   No growth at 24 hours          Cardiac testing : reviewed   EKG 12 Lead ECG (11.06.23 @ 00:43) >  Ventricular Rate 111 BPM  QTC Calculation(Bazett) 505 ms  Sinus tachycardia  Left atrial enlargement  Left bundle branch block  Abnormal ECG  When compared with ECG of 05-SEP-2021 13:37,  Aberrant conduction is no longer Present        Procedures :     Devices:     Current medications:  acetaminophen     Tablet .. 650 milliGRAM(s) Oral every 6 hours PRN  aspirin  chewable 81 milliGRAM(s) Oral daily  atorvastatin 40 milliGRAM(s) Oral at bedtime  azithromycin  IVPB 500 milliGRAM(s) IV Intermittent every 24 hours  cefTRIAXone Injectable. 1000 milliGRAM(s) IV Push every 24 hours  clopidogrel Tablet 75 milliGRAM(s) Oral daily  dextrose 5%. 1000 milliLiter(s) IV Continuous <Continuous>  dextrose 5%. 1000 milliLiter(s) IV Continuous <Continuous>  dextrose 50% Injectable 25 Gram(s) IV Push once  dextrose 50% Injectable 12.5 Gram(s) IV Push once  dextrose 50% Injectable 25 Gram(s) IV Push once  dextrose Oral Gel 15 Gram(s) Oral once PRN  enoxaparin Injectable 80 milliGRAM(s) SubCutaneous every 12 hours  glucagon  Injectable 1 milliGRAM(s) IntraMuscular once  influenza  Vaccine (HIGH DOSE) 0.7 milliLiter(s) IntraMuscular once  insulin glargine Injectable (LANTUS) 10 Unit(s) SubCutaneous at bedtime  insulin lispro (ADMELOG) corrective regimen sliding scale   SubCutaneous three times a day before meals  insulin lispro (ADMELOG) corrective regimen sliding scale   SubCutaneous at bedtime  ondansetron Injectable 4 milliGRAM(s) IV Push every 6 hours PRN  QUEtiapine 50 milliGRAM(s) Oral at bedtime            
CHIEF COMPLAINT:    HPI:    78 F with Hx of Dementia has been bedbound for about 2 years since getting COVID in 2021.  She is at baseline knows her name and is oriented to person only.  Hx obtained from speaking to the patient's daughter as patient is unable to provide any significant history.  She lives at home with her daughter and she has an aide.  She has not left the house in 2 years.  She was brought to the ED because of shortness of breath, cough and she had one episode of vomiting.  No reported abdominal pain.  No reported fevers, chills, shakes, diarrhea or dysuria.      In the ED, she was noted to be hypoxic and had a fever to 102 and was tachycardic.  Imaging revealed a concern for multifocal PNA.  She was treated with IV Rocephin, IV Zithromax.  She did become hypotensive in the ED with a SBP in the 80s, and was treated with a total of 2.5L of NS.  BP has improved.  Lactate was normal.  Initial trop was 94 and BNP was 7472.    11/7/23: Pt awake, alert and oriented to person.  Denies cp or sob.  Tele: RSR-ST 70's-110, PVC's  11/8/23: seen in bed, confused, no cardiac complaints, Tele: NSR 90s, PVCs    Consulted for CHF exacerbation.  Reviewed history w/ daughter - worsening dypsnea  & cough, no LE edema no PND    Reviewed chart.  Pt w/ h/o very severe (critical AS):  AS peak velocities 5.7 m/sec, mean gradient 72 mmHg.  Pt has dementia, poor historian.  Daughter unaware of this diagnosis nor was she aware  of previous evaluation by Dr. Parada in '20.  He discussed w/ pt's  severe AS & conservative v. TAVR.  Later seen by Dr. Rueda in hospital  Pt requested [facility] cardio as consultants for this admit.    MEDICATIONS:  OUTPATIENT:  Home Medications:  Seroquel 50 mg oral tablet: 1 tab(s) orally once a day (at bedtime) (06 Nov 2023 08:03)    MEDICATIONS  (STANDING):  aspirin  chewable 81 milliGRAM(s) Oral daily  atorvastatin 40 milliGRAM(s) Oral at bedtime  cefTRIAXone Injectable. 1000 milliGRAM(s) IV Push every 24 hours  clopidogrel Tablet 75 milliGRAM(s) Oral daily  dextrose 5%. 1000 milliLiter(s) (50 mL/Hr) IV Continuous <Continuous>  dextrose 5%. 1000 milliLiter(s) (100 mL/Hr) IV Continuous <Continuous>  dextrose 50% Injectable 25 Gram(s) IV Push once  dextrose 50% Injectable 25 Gram(s) IV Push once  dextrose 50% Injectable 12.5 Gram(s) IV Push once  doxycycline IVPB 100 milliGRAM(s) IV Intermittent every 12 hours  enoxaparin Injectable 80 milliGRAM(s) SubCutaneous every 12 hours  glucagon  Injectable 1 milliGRAM(s) IntraMuscular once  influenza  Vaccine (HIGH DOSE) 0.7 milliLiter(s) IntraMuscular once  insulin glargine Injectable (LANTUS) 10 Unit(s) SubCutaneous at bedtime  insulin lispro (ADMELOG) corrective regimen sliding scale   SubCutaneous at bedtime  insulin lispro (ADMELOG) corrective regimen sliding scale   SubCutaneous three times a day before meals  QUEtiapine 25 milliGRAM(s) Oral at bedtime  thiamine Injectable 100 milliGRAM(s) IV Push daily    MEDICATIONS  (PRN):  acetaminophen     Tablet .. 650 milliGRAM(s) Oral every 6 hours PRN Mild Pain (1 - 3)  dextrose Oral Gel 15 Gram(s) Oral once PRN Blood Glucose LESS THAN 70 milliGRAM(s)/deciliter  ondansetron Injectable 4 milliGRAM(s) IV Push every 6 hours PRN Nausea and/or Vomiting    Vital Signs Last 24 Hrs  T(C): 36.3 (08 Nov 2023 08:04), Max: 36.3 (07 Nov 2023 20:36)  T(F): 97.4 (08 Nov 2023 08:04), Max: 97.4 (08 Nov 2023 08:04)  HR: 86 (08 Nov 2023 08:04) (86 - 114)  BP: 112/74 (08 Nov 2023 08:04) (94/66 - 112/74)  BP(mean): --  RR: 18 (08 Nov 2023 08:04) (18 - 18)  SpO2: 97% (08 Nov 2023 08:04) (92% - 97%)    Parameters below as of 08 Nov 2023 08:04  Patient On (Oxygen Delivery Method): nasal cannula  O2 Flow (L/min): 2      PHYSICAL EXAM:    Constitutional: NAD, awake  HEENT: PERR, EOMI,   Neck:  supple,  No JVD  Respiratory:  diminished breath sounds  Cardiovascular: S1 and S2, regular rate and rhythm, loud systolic Murmurs no radiation to carotids.  Gastrointestinal: Bowel Sounds present, soft, nontender.   Extremities: No peripheral edema. No clubbing or cyanosis.  Vascular: 2+ peripheral pulses  Neurological: A/O x 1/2, no focal deficits      ===============================  ===============================  LABS: reviewed   CARDIAC MARKERS ( 08 Nov 2023 07:50 )  x     / x     / 377 U/L / x     / x                              11.2   9.74  )-----------( 300      ( 08 Nov 2023 07:50 )             33.3     11-08    130<L>  |  100  |  18  ----------------------------<  244<H>  4.7   |  22  |  0.60    Ca    8.8      08 Nov 2023 07:50  Phos  2.5     11-08  Mg     2.0     11-08    TPro  6.4  /  Alb  2.4<L>  /  TBili  0.4  /  DBili  x   /  AST  114<H>  /  ALT  24  /  AlkPhos  57  11-08    - TroponinI hsT: <-79647.71, <-35529.66, <-34847.44, <-72301.82, <-93.99    -------  -TROPONIN VALUES:   Troponin I, High Sensitivity Result: 12,182.82 ng/L *H* (11-06-23 @ 09:05)    Troponin I, High Sensitivity Result: 93.99 ng/L *H* (11-06-23 @ 00:15)    Troponin I, Serum: <0.015 ng/mL [0.015 - 0.045] (02-05-21 @ 16:55)  Troponin I, Serum: <0.015 ng/mL [0.015 - 0.045] (02-05-21 @ 14:00)  Troponin I, Serum: <0.015 ng/mL [0.015 - 0.045] (01-18-21 @ 10:25)        ===============================  ===============================  EKG: NSR LBBB      ==================================  ==================================    EXAM:  ECHO TTE WO CON COMP W DOPP    PROCEDURE DATE:  06/30/2021    INTERPRETATION:  Transhoracic Echocardiography Report (TTE)     Demographics     Patient name         MARIFER BARRAGAN       Age           76 year(s)     Med Rec #  [id number]          Gender        Female     Account #            [id number]       Date of Birth 1945     Interpreting         Valdo Brown MD   Room Number   0532   Physician      Doppler Measurements:     AV Velocity:568 cm/s                  MV Peak E-Wave: 92.1 cm/s   AV Peak Gradient: 129.05 mmHg         MV Peak A-Wave: 178 cm/s   AV Mean Gradient: 72 mmHg             MV E/A Ratio: 0.52 %                                         MV Peak Gradient: 3.39 mmHg     Findings     Mitral Valve   Moderate to severe mitral annular calcification is present.   There is calcification of mitral valve . The leaflet opening is moderately   restricted.   Mean transmitral gradient is 6 mmHg; this finding is consistent with   moderate mitral stenosis.   Mild (1+) mitral regurgitation is present.     Aortic Valve   Significant fibrocalcific changes noted to the aortic valve leaflets with   restriction in leaflet excursion. Peak and mean transaortic gradients are   129 and 72 mmHg respectively; this finding is consistent with severe   aortic stenosis.     Impression     Summary     Concentric left ventricular hypertrophy is present.   Estimated left ventricular ejection fraction is 60 %.   Normal appearing left atrium.  Normal appearing right atrium.   Normal appearing right ventricle structure and function.   ***************Significant fibrocalcific changes noted to the aortic valve leaflets with   *************** restriction in leaflet excursion. Peak and mean transaortic gradients are   *************** 129 and 72 mmHg respectively; this finding is consistent with severe   ***************aortic stenosis.   Moderate to severe mitral annular calcification is present.   There is calcification of mitral valve . The leaflet opening is moderately   restricted.    ***************Mean transmitral gradient is 6 mmHg; this finding is consistent with    ***************moderate mitral stenosis.   Mild (1+) mitral regurgitation is present.   The tricuspid valve is not well visualized, but is probably normal.   No evidence of pericardial effusion.     Signature     ----------------------------------------------------------------   Electronically signed by Valdo Brown MD(Interpreting   physician) on 06/30/2021 02:57 PM   ----------------------------------------------------------------  VALDO BROWN MD; Attending Cardiologist  This document has been electronically signed. Jun 30 2021  2:57PM  =======================  < from: CT Angio Chest PE Protocol w/ IV Cont (11.06.23 @ 01:19) >  ACC: [id number] EXAM:  CT ABDOMEN AND PELVIS IC   ORDERED BY: IWONA BRAXTON     ACC: [id number] EXAM:  CT ANGIO CHEST PULM ART [facility]   ORDERED BY: IWONA BRAXTON     PROCEDURE DATE:  11/06/2023          INTERPRETATION:  CLINICAL INFORMATION: Hypoxia, shortness of breath,   sepsis    COMPARISON: CT abdomen pelvis 9/5/2021 and prior studies    CONTRAST/COMPLICATIONS:  IV Contrast: Omnipaque 350 (accession [id number]), IV contrast documented   in unlinked concurrent exam (accession [id number])  90 cc administered   10   cc discarded  Oral Contrast: NONE  Complications: None reported at time of study completion    PROCEDURE:  CT Angiography of the Chest was performed followed by portal venous phase   imaging of the Abdomen and Pelvis.  Sagittal and coronal reformats were performed as well as 3D (MIP)   reconstructions.    FINDINGS:  CHEST:  LUNGS AND LARGE AIRWAYS: Patent central airways. Diffuse upper lobe   predominant patchy airspace opacities with septal thickening  PLEURA: Small to moderate bilateral pleural effusions  VESSELS: No pulmonary embolism. Atherosclerotic changes in the aorta and   coronary arteries.  HEART: Heart size is normal. No pericardial effusion. Aortic valve   calcifications. Mitral annular calcifications.  MEDIASTINUM AND CRISTOBAL: No lymphadenopathy.  CHEST WALL AND LOWER NECK: 3.5 x 2.5 cm heterogeneously enhancing left   breast mass, new compared to the most recent prior imaging from 2021.   Asymmetrically prominent left axillary lymph node measuring 1.2 cm short   axis. Dystrophic calcifications in the right breast.    ABDOMEN AND PELVIS:  LIVER: Within normal limits.  BILE DUCTS: Normal caliber.  GALLBLADDER: Cholelithiasis.  SPLEEN: Within normal limits.  PANCREAS: Diffuse fatty atrophy.  ADRENALS: Within normal limits.  KIDNEYS/URETERS: Within normal limits.    BLADDER: Within normal limits.  REPRODUCTIVE ORGANS: Uterus and adnexa within normal limits.    BOWEL: No bowel obstruction. Appendix is normal.  PERITONEUM: No ascites.  VESSELS: Atherosclerotic changes.  RETROPERITONEUM/LYMPH NODES: No lymphadenopathy.  ABDOMINAL WALL: Fat-containing inguinal hernia.  BONES: No acute osseous abnormality.    IMPRESSION:  No pulmonary embolism.    Diffuse upper lobe predominant patchy airspace opacities with septal   thickening, likely representing multifocal infection in the given   clinical context, superimposed edema is not excluded.    Small-to-moderate bilateral pleural effusions.    3.5 cm heterogeneously enhancing left breast mass with asymmetrically  prominent left axillary lymph node. These findings are highly suspicious   for malignancy with abscess as an alternative though less likely   possibility. Dedicated breast imaging is recommended for further   characterization.        --- End of Report ---            RYAN APODACA MD; Attending Radiologist  This document has been electronically signed. Nov 6 2023  2:23AM  =======================  < from: Xray Chest 1 View-PORTABLE IMMEDIATE (11.06.23 @ 00:44) >    ACC: [id number] EXAM:  XR CHEST PORTABLE IMMED 1V   ORDERED BY: IWONA BRAXTON     PROCEDURE DATE:  11/06/2023          INTERPRETATION:  AP chest on November 6, 2023 at 12:32 AM. Patient has   sepsis.    Heart magnified by technique.    On September 5, 2021 there was a consolidated infiltrate in the right   upper lobe and left perihilar infiltrate.    I present examination there is diffuse mild to moderate infiltration in   the left lung and scattered right perihilar infiltration.    IMPRESSION: Bilateral infiltrates left greater than right presently seen.    --- End of Report ---            CHUY CANSECO MD; Attending Radiologist  This document has been electronically signed. Nov 6 2023  8:50AM    < end of copied text >

## 2023-11-12 NOTE — PROGRESS NOTE ADULT - PROVIDER SPECIALTY LIST ADULT
Hospitalist
Palliative Care
Cardiology
Cardiology

## 2023-11-12 NOTE — PROGRESS NOTE ADULT - ASSESSMENT
78 F with Hx of Dementia, DM2, h/o CVA, severe AS, carotid artery stenosis s/p stent,  chronic LBBB, h/o remote  breast cancer  has been bedbound for about 2 years since getting COVID in 2021,  was brought to the ED on 11/6/23 with shortness of breath, cough and she had one episode of vomiting.      In the ED, she was noted to be hypoxic and had a fever to 102 and was tachycardic.  Imaging revealed a concern for multifocal PNA.  She was treated with IV Rocephin, IV Zithromax.  She did become hypotensive in the ED with a SBP in the 80s, and was treated with a total of 2.5L of NS.  BP has improved.  Lactate was normal.  Initial trop was 94 and BNP was 7472.    Acute Hypoxic Respiratory Failure, multifactorial CAP and acute diastolic HF and valvular heart disease severe AS, MS  Sepsis, POA due to CAP  Elevated troponin likely due to  NSTEMI  -  admit to tele, hospitalist service, and inpatient  -  serial cardiac enzymes- - TroponinI hsT: 19162<-- 78773<-70008.71, <-58127.66, <-26540.44, <-26603.82, <-93.99  - c/w DAPT , s/p 48h of full dose of lovenox --> prophylactic dose of lovenox  -  cardio consult - Dr. Bautista  -  unable to diurese at this time given the hypotension  - c/w Rocephin, s/p  IV Zithromax--> doxy due to prolonged QT  -  follow up blood cultures, CRP 99  -  OOB to chair,   Aspiration and Fall precautions  -  Strict Is/Os,   check daily weights 76--> 72 kg   - 6/2021 -2 d echo - EF 60 %, severe AS, moderate MS  - repeat echo - EF 30%, severe AS, severe pHTN    -  Nasal cannula O2  - CXR 11/10 - pending     11/10 - Urinary retention   - send UA, urine cultures  - c/w IV abx  - start bethanechol  - bladder scans q6h     Constipation   - dulcolax, miralax, monitor bm  - 11/10 - lactulose 10    Type 2 Diabetes Mellitus with A1C 7.4  -  convert Levemir to Lantus 10 hs  -  check sugars qAC/HS, Admelog SSI    New left breast 3.5cm mass in patient with Hx of breast cancer, concern for recurrence    Acute Metabolic Encephalopathy due to dementia, hypoxia and sepsis  - check ammonia, TSH, B12, folate wnl , CRP 99  - lower dose of seroquel 50--> 25 with prn low doses as needed due to prolonged      Prolonged --> 496, improving  - lower dose of seroquel 50--> 25--> 12.5   - stop azithromycin   - serial ekgs     Vitamin D deficiency - replace    Hypoalbuminemia - add supplements    Mild Hyponatremia - persist , improving      DVT prophylaxis - venodynes and low dose  Lovenox s/p  full dose completed 72 h     GOC / Advance Directives - spoke with patient's daughter Soumya who is acting as the HCP as patient lacks decisional capacity.  Her number is [phone].  She is aware of the new left breast mass and at this time is likely not interested in working it up and will discuss with her brother Finn.  Also, she made it clear, that patient's wishes were to be DNR/DNI, and therefore I completed a MOLST form on this admission and placed in the chart    daughter Soumya who is acting as the [phone] - updated 11/7/23, 11/9/23 , 11/10     Dispo - needs O2, gel matres for hospital bed,  ? dippers, wipes , home hospice set up , plan for discharge Saturday Sunday       
78 F with Hx of Dementia, DM2, h/o CVA, severe AS, carotid artery stenosis s/p stent,  chronic LBBB, h/o remote  breast cancer  has been bedbound for about 2 years since getting COVID in 2021,  was brought to the ED on 11/6/23 with shortness of breath, cough and she had one episode of vomiting.      In the ED, she was noted to be hypoxic and had a fever to 102 and was tachycardic.  Imaging revealed a concern for multifocal PNA.  She was treated with IV Rocephin, IV Zithromax.  She did become hypotensive in the ED with a SBP in the 80s, and was treated with a total of 2.5L of NS.  BP has improved.  Lactate was normal.  Initial trop was 94 and BNP was 7472.    Acute Hypoxic Respiratory Failure, multifactorial CAP and acute diastolic HF and valvular heart disease severe AS, MS  Sepsis, POA due to CAP  Elevated troponin likely due to  NSTEMI  -  admit to tele, hospitalist service, and inpatient  -  serial cardiac enzymes- - TroponinI hsT: 86683<-- 14300<-16852.71, <-52433.66, <-26656.44, <-17648.82, <-93.99  - c/w DAPT , s/p 48h of full dose of lovenox --> prophylactic dose of lovenox  -  cardio consult - Dr. Bautista  -  unable to diurese at this time given the hypotension  - c/w Rocephin, s/p  IV Zithromax--> doxy due to prolonged QT  -  follow up blood cultures, CRP 99  -  OOB to chair,   Aspiration and Fall precautions  -  Strict Is/Os,   check daily weights 76--> 72 kg   - 6/2021 -2 d echo - EF 60 %, severe AS, moderate MS  - repeat echo - EF 30%, severe AS, severe pHTN    -  Nasal cannula O2  - CXR 11/10 - pending     11/10 - Urinary retention   - send UA, urine cultures  - c/w IV abx  - start bethanechol  - bladder scans q6h     Constipation, resolved   - dulcolax, miralax, monitor bm  - 11/10 - lactulose 10  - 11/11 s/p enema    Type 2 Diabetes Mellitus with A1C 7.4  -  convert Levemir to Lantus 10 hs  -  check sugars qAC/HS, Admelog SSI    New left breast 3.5cm mass in patient with Hx of breast cancer, concern for recurrence    Acute Metabolic Encephalopathy due to dementia, hypoxia and sepsis  - check ammonia, TSH, B12, folate wnl , CRP 99  - lower dose of seroquel 50--> 25 with prn low doses as needed due to prolonged      Prolonged --> 496, improving  - lower dose of seroquel 50--> 25--> 12.5   - stop azithromycin   - serial ekgs     Vitamin D deficiency - replace    Hypoalbuminemia - add supplements    Mild Hyponatremia - persist , improving      DVT prophylaxis - venodynes and low dose  Lovenox s/p  full dose completed 72 h     GOC / Advance Directives - spoke with patient's daughter Soumya who is acting as the HCP as patient lacks decisional capacity.  Her number is [phone].  She is aware of the new left breast mass and at this time is likely not interested in working it up and will discuss with her brother Finn.  Also, she made it clear, that patient's wishes were to be DNR/DNI, and therefore I completed a MOLST form on this admission and placed in the chart    daughter Soumya who is acting as the [phone] - updated 11/7/23, 11/9/23 , 11/10     d/c planning in 24-48 h    Dispo - needs O2, gel matres for hospital bed,  ? dippers, wipes , home hospice set up , plan for discharge Saturday Sunday       
Pt is a 78y old Female with hx of Dementia has been bedbound for about 2 years since getting COVID in . Hx obtained from speaking to the patient's daughter as patient is unable to provide any significant history.  She lives at home with her daughter and she has an aide.  She has not left the house in 2 years.  She was brought to the ED because of shortness of breath, cough and she had one episode of vomiting.  No reported abdominal pain.  Palliative medicine consulted to help establish GOC and advance care planning     1) Dementia   - unsure of baseline   - PT as able to tolerate   - supportive care     2) CHF   - cardiology consult pending    - elevated troponin - patients daughter not interested in cardiac cath or invasive procedures   - c/w  ASA, statin, Lovenox, Plavix    3) Breast Mass   - CT reviewed - highly suspicious for breast mass   - as per chart family has decided to not work this up or further interventions will confirm in GOC meeting    Process of Care  --Reviewed dx/treatment problems and alignment with Goals of Care    Physical Aspects of Care  --Pain  patient appears comfortable at this time     --Bowel Regimen  denies constipation  risk for constipation d/t immobility  daily dulcolax    --Dyspnea  No SOB at this time  comfortable and in NAD    --Nausea Vomiting  denies    --Weakness  PT as tolerated     Psychological and Psychiatric Aspects of Care:   --Greif/Bereavment: emotional support provided  --Hx of psychiatric dx: none  -Pastoral Care Available PRN     Social Aspects of Care  -SW involved     Cultural Aspects  -Primary Language: English    Goals of Care:     We discussed Palliative Care team being a supportive team when a patient has ongoing illnesses.  We also discussed that it is not an end of life care service, but can help navigate symptoms and emotional support through out their hospital stay here.    Ethical and Legal Aspects:   NA    Capacity- pt does not have capacity     HCP/Surrogate: on one content naming patient  who as per daughters i , no updated HCP meaning all four children would be surrogate decisions maker, Patient daughter Jo states that patient lives with Soumya and ok to make decisions Soumya states that patient lives with her and her two brothers are on the same page about no aggressive measures she will speak to them about setting up GOC meeting      Code Status- DNR/I   MOLST- placed on chart   Dispo Plan- family requesting referral be placed to [facility]    Discussed With:  spoke with Bridget Ramírez and Dr. Roslyn Handy coordinated with attending and SW and RN     Time Spent: 40 minutes including the care, coordination and counseling of this patient, 50% of which was spent coordinating and counseling. 
78 F with Hx of Dementia, DM2, h/o CVA, severe AS, carotid artery stenosis s/p stent,  chronic LBBB, h/o remote  breast cancer  has been bedbound for about 2 years since getting COVID in 2021,  was brought to the ED on 11/6/23 with shortness of breath, cough and she had one episode of vomiting.      In the ED, she was noted to be hypoxic and had a fever to 102 and was tachycardic.  Imaging revealed a concern for multifocal PNA.  She was treated with IV Rocephin, IV Zithromax.  She did become hypotensive in the ED with a SBP in the 80s, and was treated with a total of 2.5L of NS.  BP has improved.  Lactate was normal.  Initial trop was 94 and BNP was 7472.    Acute Hypoxic Respiratory Failure, multifactorial CAP and acute diastolic HF and valvular heart disease severe AS, MS  Sepsis, POA due to CAP  Elevated troponin likely due to  NSTEMI  -  admit to tele, hospitalist service, and inpatient  -  serial cardiac enzymes, r/o MI  -  cardio consult - Dr. Bautista  -  unable to diurese at this time given the hypotension, if BP stays stable, will start IV Lasix  -  IV Rocephin and IV Zithromax, follow up blood cultures  -  OOB to chair,   Aspiration and Fall precautions  -  Strict Is/Os,   check daily weights  - 2 d echo - EF 60 %, severe AS, moderate MS   -  Nasal cannula O2    Type 2 Diabetes Mellitus with A1C 7.4  -  convert Levemir to Lantus  -  check sugars qAC/HS, Admelog SSI    New left breast 3.5cm mass in patient with Hx of breast cancer, concern for recurrence    Acute Metabolic Encephalopathy due to dementia, hypoxia and sepsis  - check ammonia, TSH, B12, folate , CRP  - lower dose of seroquel 50--> 25 with prn low doses as needed due to prolonged      Hypoalbuminemia - add supplements    Mild Hyponatremia - persist      DVT prophylaxis - venodynes and Lovenox    GOC / Advance Directives - spoke with patient's daughter Soumya who is acting as the HCP as patient lacks decisional capacity.  Her number is [phone].  She is aware of the new left breast mass and at this time is likely not interested in working it up and will discuss with her brother Finn.  Also, she made it clear, that patient's wishes were to be DNR/DNI, and therefore I completed a MOLST form on this admission and placed in the chart    daughter Soumya who is acting as the [phone] - left the message       
78 F with Hx of Dementia, DM2, h/o CVA, severe AS, carotid artery stenosis s/p stent,  chronic LBBB, h/o remote  breast cancer  has been bedbound for about 2 years since getting COVID in 2021,  was brought to the ED on 11/6/23 with shortness of breath, cough and she had one episode of vomiting.      In the ED, she was noted to be hypoxic and had a fever to 102 and was tachycardic.  Imaging revealed a concern for multifocal PNA.  She was treated with IV Rocephin, IV Zithromax.  She did become hypotensive in the ED with a SBP in the 80s, and was treated with a total of 2.5L of NS.  BP has improved.  Lactate was normal.  Initial trop was 94 and BNP was 7472.    Acute Hypoxic Respiratory Failure, multifactorial CAP and acute diastolic HF and valvular heart disease severe AS, MS  Sepsis, POA due to CAP  Elevated troponin likely due to  NSTEMI  -  admit to tele, hospitalist service, and inpatient  -  serial cardiac enzymes- - TroponinI hsT: 63334<-10790.71, <-73260.66, <-50265.44, <-30067.82, <-93.99  -  cardio consult - Dr. Bautista  -  unable to diurese at this time given the hypotension  - c/w Rocephin, s/p  IV Zithromax--> doxy due to prolonged QT  -  follow up blood cultures, CRP 99  -  OOB to chair,   Aspiration and Fall precautions  -  Strict Is/Os,   check daily weights 76--> 72 kg   - 2 d echo - EF 60 %, severe AS, moderate MS   -  Nasal cannula O2  - CXR in am    Type 2 Diabetes Mellitus with A1C 7.4  -  convert Levemir to Lantus 10 hs  -  check sugars qAC/HS, Admelog SSI    New left breast 3.5cm mass in patient with Hx of breast cancer, concern for recurrence    Acute Metabolic Encephalopathy due to dementia, hypoxia and sepsis  - check ammonia, TSH, B12, folate wnl , CRP 99  - lower dose of seroquel 50--> 25 with prn low doses as needed due to prolonged      Prolonged --> 496, improving  - lower dose of seroquel 50--> 25--> 12.5   - stop azithromycin   - serial ekgs     Vitamin D deficiency - replace    Hypoalbuminemia - add supplements    Mild Hyponatremia - persist , improving     Constipation - dulcolax, miralax, monitor bm     DVT prophylaxis - venodynes and Lovenox full dose complete 72 h --> plan to transition to low prophylactic dose kang    GOC / Advance Directives - spoke with patient's daughter Soumya who is acting as the HCP as patient lacks decisional capacity.  Her number is [phone].  She is aware of the new left breast mass and at this time is likely not interested in working it up and will discuss with her brother Finn.  Also, she made it clear, that patient's wishes were to be DNR/DNI, and therefore I completed a MOLST form on this admission and placed in the chart    daughter Soumya who is acting as the [phone] - updated 11/7/23, 11/9/23     Dispo - needs O2 evaluation for need home O2, lives at home with aids, monitor BM, plan for discharge saturday      
78 F with Hx of Dementia, DM2, h/o CVA, severe AS, carotid artery stenosis s/p stent,  chronic LBBB, h/o remote  breast cancer  has been bedbound for about 2 years since getting COVID in 2021,  was brought to the ED on 11/6/23 with shortness of breath, cough and she had one episode of vomiting.      In the ED, she was noted to be hypoxic and had a fever to 102 and was tachycardic.  Imaging revealed a concern for multifocal PNA.  She was treated with IV Rocephin, IV Zithromax.  She did become hypotensive in the ED with a SBP in the 80s, and was treated with a total of 2.5L of NS.  BP has improved.  Lactate was normal.  Initial trop was 94 and BNP was 7472.    Acute Hypoxic Respiratory Failure, multifactorial CAP and acute diastolic HF and valvular heart disease severe AS, MS  Sepsis, POA due to CAP  Elevated troponin likely due to  NSTEMI  -  admit to tele, hospitalist service, and inpatient  -  serial cardiac enzymes- - TroponinI hsT: 12999<-- 10619<-50344.71, <-02980.66, <-52438.44, <-31345.82, <-93.99  - c/w DAPT , s/p 48h of full dose of lovenox --> prophylactic dose of lovenox  -  cardio consult - Dr. Bautista  -  unable to diurese at this time given the hypotension  - c/w Rocephin 7 days , s/p  IV Zithromax--> doxy  5 days due to prolonged QT  -  follow up blood cultures, CRP 99 --> 24 trending down  -  OOB to chair,   Aspiration and Fall precautions  -  Strict Is/Os,   check daily weights 76--> 72 kg   - 6/2021 -2 d echo - EF 60 %, severe AS, moderate MS  - repeat echo - EF 30%, severe AS, severe pHTN    -  Nasal cannula O2  - CXR 11/10 - pending     11/10 - Urinary retention s/p Jain   - send UA, urine cultures - neg   - c/w IV abx  - c/w bethanechol  - Jain care     Constipation, resolved   - dulcolax, miralax, monitor bm  - 11/10 - lactulose 10  - 11/11 s/p enema    Type 2 Diabetes Mellitus with A1C 7.4  -  convert Levemir to Lantus 10 hs  -  check sugars qAC/HS, Admelog SSI    New left breast 3.5cm mass in patient with Hx of breast cancer, concern for recurrence    Acute Metabolic Encephalopathy due to dementia, hypoxia and sepsis  - check ammonia, TSH, B12, folate wnl , CRP 99  - lower dose of seroquel 50--> 25 with prn low doses as needed due to prolonged      Prolonged --> 496, improving  - lower dose of seroquel 50--> 25--> 12.5   - stop azithromycin   - serial ekgs     Vitamin D deficiency - replace    Hypoalbuminemia - add supplements    Mild Hyponatremia - persist , improving      DVT prophylaxis - venodynes and low dose  Lovenox s/p  full dose completed 72 h     GOC / Advance Directives - spoke with patient's daughter Soumya who is acting as the HCP as patient lacks decisional capacity.  Her number is [phone].  She is aware of the new left breast mass and at this time is likely not interested in working it up and will discuss with her brother Finn.  Also, she made it clear, that patient's wishes were to be DNR/DNI, and therefore I completed a MOLST form on this admission and placed in the chart    daughter Soumya who is acting as the [phone] - updated 11/7/23, 11/9/23 , 11/10     d/c planning in 24-48 h    Dispo - needs O2, gel matres for hospital bed,  ? dippers, wipes , home hospice set up , plan for discharge for kang with home hospice      
78 F with Hx of Dementia, DM2, h/o CVA, severe AS, carotid artery stenosis s/p stent,  chronic LBBB, h/o remote  breast cancer  has been bedbound for about 2 years since getting COVID in 2021,  was brought to the ED on 11/6/23 with shortness of breath, cough and she had one episode of vomiting.      In the ED, she was noted to be hypoxic and had a fever to 102 and was tachycardic.  Imaging revealed a concern for multifocal PNA.  She was treated with IV Rocephin, IV Zithromax.  She did become hypotensive in the ED with a SBP in the 80s, and was treated with a total of 2.5L of NS.  BP has improved.  Lactate was normal.  Initial trop was 94 and BNP was 7472.    Acute Hypoxic Respiratory Failure, multifactorial CAP and acute diastolic HF and valvular heart disease severe AS, MS  Sepsis, POA due to CAP  Elevated troponin likely due to  NSTEMI  -  admit to tele, hospitalist service, and inpatient  -  serial cardiac enzymes- - TroponinI hsT: <-26898.71, <-53137.66, <-11750.44, <-61346.82, <-93.99  -  cardio consult - Dr. Bautista  -  unable to diurese at this time given the hypotension  - c/w Rocephin, s/p  IV Zithromax--> doxy due to prolonged QT  -  follow up blood cultures, CRP 99  -  OOB to chair,   Aspiration and Fall precautions  -  Strict Is/Os,   check daily weights 76--> 72 kg   - 2 d echo - EF 60 %, severe AS, moderate MS   -  Nasal cannula O2    Type 2 Diabetes Mellitus with A1C 7.4  -  convert Levemir to Lantus 10 hs  -  check sugars qAC/HS, Admelog SSI    New left breast 3.5cm mass in patient with Hx of breast cancer, concern for recurrence    Acute Metabolic Encephalopathy due to dementia, hypoxia and sepsis  - check ammonia, TSH, B12, folate wnl , CRP 99  - lower dose of seroquel 50--> 25 with prn low doses as needed due to prolonged      Prolonged --> 496, improving  - lower dose of seroquel 50--> 25--> 12.5   - stop azithromycin   - serial ekgs    Vitamin D deficiency - replace    Hypoalbuminemia - add supplements    Mild Hyponatremia - persist      DVT prophylaxis - venodynes and Lovenox    GOC / Advance Directives - spoke with patient's daughter Soumya who is acting as the HCP as patient lacks decisional capacity.  Her number is [phone].  She is aware of the new left breast mass and at this time is likely not interested in working it up and will discuss with her brother Finn.  Also, she made it clear, that patient's wishes were to be DNR/DNI, and therefore I completed a MOLST form on this admission and placed in the chart    daughter Soumya who is acting as the [phone] - updated 11/7/23

## 2023-11-12 NOTE — PROGRESS NOTE ADULT - REASON FOR ADMISSION
shortness of breath and hypoxia

## 2023-11-13 ENCOUNTER — TRANSCRIPTION ENCOUNTER (OUTPATIENT)
Age: 78
End: 2023-11-13

## 2023-11-13 VITALS
RESPIRATION RATE: 18 BRPM | OXYGEN SATURATION: 98 % | WEIGHT: 162.7 LBS | SYSTOLIC BLOOD PRESSURE: 124 MMHG | DIASTOLIC BLOOD PRESSURE: 64 MMHG | TEMPERATURE: 98 F | HEART RATE: 80 BPM

## 2023-11-13 LAB
ANION GAP SERPL CALC-SCNC: 6 MMOL/L — SIGNIFICANT CHANGE UP (ref 5–17)
ANION GAP SERPL CALC-SCNC: 6 MMOL/L — SIGNIFICANT CHANGE UP (ref 5–17)
BUN SERPL-MCNC: 15 MG/DL — SIGNIFICANT CHANGE UP (ref 7–23)
BUN SERPL-MCNC: 15 MG/DL — SIGNIFICANT CHANGE UP (ref 7–23)
CALCIUM SERPL-MCNC: 8.5 MG/DL — SIGNIFICANT CHANGE UP (ref 8.5–10.1)
CALCIUM SERPL-MCNC: 8.5 MG/DL — SIGNIFICANT CHANGE UP (ref 8.5–10.1)
CHLORIDE SERPL-SCNC: 101 MMOL/L — SIGNIFICANT CHANGE UP (ref 96–108)
CHLORIDE SERPL-SCNC: 101 MMOL/L — SIGNIFICANT CHANGE UP (ref 96–108)
CO2 SERPL-SCNC: 29 MMOL/L — SIGNIFICANT CHANGE UP (ref 22–31)
CO2 SERPL-SCNC: 29 MMOL/L — SIGNIFICANT CHANGE UP (ref 22–31)
CREAT SERPL-MCNC: 0.41 MG/DL — LOW (ref 0.5–1.3)
CREAT SERPL-MCNC: 0.41 MG/DL — LOW (ref 0.5–1.3)
CRP SERPL-MCNC: 17 MG/L — HIGH
CRP SERPL-MCNC: 17 MG/L — HIGH
EGFR: 101 ML/MIN/1.73M2 — SIGNIFICANT CHANGE UP
EGFR: 101 ML/MIN/1.73M2 — SIGNIFICANT CHANGE UP
GLUCOSE BLDC GLUCOMTR-MCNC: 132 MG/DL — HIGH (ref 70–99)
GLUCOSE BLDC GLUCOMTR-MCNC: 132 MG/DL — HIGH (ref 70–99)
GLUCOSE BLDC GLUCOMTR-MCNC: 159 MG/DL — HIGH (ref 70–99)
GLUCOSE BLDC GLUCOMTR-MCNC: 159 MG/DL — HIGH (ref 70–99)
GLUCOSE SERPL-MCNC: 139 MG/DL — HIGH (ref 70–99)
GLUCOSE SERPL-MCNC: 139 MG/DL — HIGH (ref 70–99)
HCT VFR BLD CALC: 35.2 % — SIGNIFICANT CHANGE UP (ref 34.5–45)
HCT VFR BLD CALC: 35.2 % — SIGNIFICANT CHANGE UP (ref 34.5–45)
HGB BLD-MCNC: 11.5 G/DL — SIGNIFICANT CHANGE UP (ref 11.5–15.5)
HGB BLD-MCNC: 11.5 G/DL — SIGNIFICANT CHANGE UP (ref 11.5–15.5)
MAGNESIUM SERPL-MCNC: 2.2 MG/DL — SIGNIFICANT CHANGE UP (ref 1.6–2.6)
MAGNESIUM SERPL-MCNC: 2.2 MG/DL — SIGNIFICANT CHANGE UP (ref 1.6–2.6)
MCHC RBC-ENTMCNC: 30.3 PG — SIGNIFICANT CHANGE UP (ref 27–34)
MCHC RBC-ENTMCNC: 30.3 PG — SIGNIFICANT CHANGE UP (ref 27–34)
MCHC RBC-ENTMCNC: 32.7 GM/DL — SIGNIFICANT CHANGE UP (ref 32–36)
MCHC RBC-ENTMCNC: 32.7 GM/DL — SIGNIFICANT CHANGE UP (ref 32–36)
MCV RBC AUTO: 92.9 FL — SIGNIFICANT CHANGE UP (ref 80–100)
MCV RBC AUTO: 92.9 FL — SIGNIFICANT CHANGE UP (ref 80–100)
NT-PROBNP SERPL-SCNC: 9917 PG/ML — HIGH (ref 0–450)
NT-PROBNP SERPL-SCNC: 9917 PG/ML — HIGH (ref 0–450)
PHOSPHATE SERPL-MCNC: 2.7 MG/DL — SIGNIFICANT CHANGE UP (ref 2.5–4.5)
PHOSPHATE SERPL-MCNC: 2.7 MG/DL — SIGNIFICANT CHANGE UP (ref 2.5–4.5)
PLATELET # BLD AUTO: 483 K/UL — HIGH (ref 150–400)
PLATELET # BLD AUTO: 483 K/UL — HIGH (ref 150–400)
POTASSIUM SERPL-MCNC: 4.1 MMOL/L — SIGNIFICANT CHANGE UP (ref 3.5–5.3)
POTASSIUM SERPL-MCNC: 4.1 MMOL/L — SIGNIFICANT CHANGE UP (ref 3.5–5.3)
POTASSIUM SERPL-SCNC: 4.1 MMOL/L — SIGNIFICANT CHANGE UP (ref 3.5–5.3)
POTASSIUM SERPL-SCNC: 4.1 MMOL/L — SIGNIFICANT CHANGE UP (ref 3.5–5.3)
RBC # BLD: 3.79 M/UL — LOW (ref 3.8–5.2)
RBC # BLD: 3.79 M/UL — LOW (ref 3.8–5.2)
RBC # FLD: 12.5 % — SIGNIFICANT CHANGE UP (ref 10.3–14.5)
RBC # FLD: 12.5 % — SIGNIFICANT CHANGE UP (ref 10.3–14.5)
SODIUM SERPL-SCNC: 136 MMOL/L — SIGNIFICANT CHANGE UP (ref 135–145)
SODIUM SERPL-SCNC: 136 MMOL/L — SIGNIFICANT CHANGE UP (ref 135–145)
TROPONIN I, HIGH SENSITIVITY RESULT: 3702.16 NG/L — HIGH
TROPONIN I, HIGH SENSITIVITY RESULT: 3702.16 NG/L — HIGH
WBC # BLD: 10.88 K/UL — HIGH (ref 3.8–10.5)
WBC # BLD: 10.88 K/UL — HIGH (ref 3.8–10.5)
WBC # FLD AUTO: 10.88 K/UL — HIGH (ref 3.8–10.5)
WBC # FLD AUTO: 10.88 K/UL — HIGH (ref 3.8–10.5)

## 2023-11-13 PROCEDURE — 99239 HOSP IP/OBS DSCHRG MGMT >30: CPT

## 2023-11-13 RX ORDER — QUETIAPINE FUMARATE 200 MG/1
1 TABLET, FILM COATED ORAL
Refills: 0 | DISCHARGE

## 2023-11-13 RX ORDER — MAGNESIUM OXIDE 400 MG ORAL TABLET 241.3 MG
1 TABLET ORAL
Qty: 30 | Refills: 0
Start: 2023-11-13 | End: 2023-12-12

## 2023-11-13 RX ORDER — QUETIAPINE FUMARATE 200 MG/1
0.5 TABLET, FILM COATED ORAL
Qty: 15 | Refills: 0
Start: 2023-11-13 | End: 2023-12-12

## 2023-11-13 RX ORDER — NALOXONE HYDROCHLORIDE 4 MG/.1ML
4 SPRAY NASAL
Qty: 3 | Refills: 0
Start: 2023-11-13 | End: 2023-11-13

## 2023-11-13 RX ORDER — CHOLECALCIFEROL (VITAMIN D3) 125 MCG
1 CAPSULE ORAL
Qty: 30 | Refills: 0
Start: 2023-11-13 | End: 2023-12-12

## 2023-11-13 RX ORDER — MORPHINE SULFATE 50 MG/1
2.5 CAPSULE, EXTENDED RELEASE ORAL
Qty: 50 | Refills: 0
Start: 2023-11-13 | End: 2023-11-17

## 2023-11-13 RX ORDER — POLYETHYLENE GLYCOL 3350 17 G/17G
17 POWDER, FOR SOLUTION ORAL
Qty: 510 | Refills: 0
Start: 2023-11-13 | End: 2023-12-12

## 2023-11-13 RX ORDER — CLOPIDOGREL BISULFATE 75 MG/1
1 TABLET, FILM COATED ORAL
Qty: 30 | Refills: 0
Start: 2023-11-13 | End: 2023-12-12

## 2023-11-13 RX ORDER — BETHANECHOL CHLORIDE 25 MG
1 TABLET ORAL
Qty: 90 | Refills: 0
Start: 2023-11-13 | End: 2023-12-12

## 2023-11-13 RX ORDER — ASPIRIN/CALCIUM CARB/MAGNESIUM 324 MG
1 TABLET ORAL
Qty: 30 | Refills: 0
Start: 2023-11-13 | End: 2023-12-12

## 2023-11-13 RX ORDER — ACETAMINOPHEN 500 MG
1 TABLET ORAL
Qty: 120 | Refills: 0
Start: 2023-11-13 | End: 2023-12-12

## 2023-11-13 RX ORDER — ATORVASTATIN CALCIUM 80 MG/1
1 TABLET, FILM COATED ORAL
Qty: 30 | Refills: 0
Start: 2023-11-13 | End: 2023-12-12

## 2023-11-13 RX ORDER — PROCHLORPERAZINE MALEATE 5 MG
1 TABLET ORAL
Qty: 90 | Refills: 0
Start: 2023-11-13 | End: 2023-12-12

## 2023-11-13 RX ORDER — THIAMINE MONONITRATE (VIT B1) 100 MG
1 TABLET ORAL
Qty: 30 | Refills: 0
Start: 2023-11-13 | End: 2023-12-12

## 2023-11-13 RX ORDER — MAGNESIUM HYDROXIDE 400 MG/1
30 TABLET, CHEWABLE ORAL
Qty: 900 | Refills: 0
Start: 2023-11-13 | End: 2023-12-12

## 2023-11-13 RX ADMIN — ENOXAPARIN SODIUM 40 MILLIGRAM(S): 100 INJECTION SUBCUTANEOUS at 12:11

## 2023-11-13 RX ADMIN — CLOPIDOGREL BISULFATE 75 MILLIGRAM(S): 75 TABLET, FILM COATED ORAL at 09:45

## 2023-11-13 RX ADMIN — Medication 10 MILLIGRAM(S): at 14:06

## 2023-11-13 RX ADMIN — Medication 10 MILLIGRAM(S): at 05:49

## 2023-11-13 RX ADMIN — MAGNESIUM HYDROXIDE 30 MILLILITER(S): 400 TABLET, CHEWABLE ORAL at 09:45

## 2023-11-13 RX ADMIN — MAGNESIUM OXIDE 400 MG ORAL TABLET 400 MILLIGRAM(S): 241.3 TABLET ORAL at 09:46

## 2023-11-13 RX ADMIN — Medication 110 MILLIGRAM(S): at 09:48

## 2023-11-13 RX ADMIN — POLYETHYLENE GLYCOL 3350 17 GRAM(S): 17 POWDER, FOR SOLUTION ORAL at 09:46

## 2023-11-13 RX ADMIN — Medication 5 MILLIGRAM(S): at 09:45

## 2023-11-13 RX ADMIN — Medication 81 MILLIGRAM(S): at 09:46

## 2023-11-13 RX ADMIN — Medication 1: at 12:11

## 2023-11-13 RX ADMIN — Medication 100 MILLIGRAM(S): at 09:45

## 2023-11-13 NOTE — DISCHARGE NOTE PROVIDER - PROVIDER TOKENS
PROVIDER:[TOKEN:[[id number]],FOLLOWUP:[1 week]],PROVIDER:[TOKEN:[[id number]],FOLLOWUP:[1 week]]

## 2023-11-13 NOTE — DISCHARGE NOTE PROVIDER - HOSPITAL COURSE
Chief complain : shortness of breath and hypoxia     HPI:      78 F with Hx of Dementia, DM2, h/o CVA, severe AS, carotid artery stenosis s/p stent,  chronic LBBB, h/o remote  breast cancer  has been bedbound for about 2 years since getting COVID in 2021.  She is at baseline knows her name and is oriented to person only.  Hx obtained from speaking to the patient's daughter as patient is unable to provide any significant history.  She lives at home with her daughter and she has an aide.  She has not left the house in 2 years.  She was brought to the ED because of shortness of breath, cough and she had one episode of vomiting.  No reported abdominal pain.  No reported fevers, chills, shakes, diarrhea or dysuria.      In the ED, she was noted to be hypoxic and had a fever to 102 and was tachycardic.  Imaging revealed a concern for multifocal PNA.  She was treated with IV Rocephin, IV Zithromax.  She did become hypotensive in the ED with a SBP in the 80s, and was treated with a total of 2.5L of NS.  BP has improved.  Lactate was normal.  Initial trop was 94 and BNP was 7472.  11/7 -    Patient seen and examined at bedside earlier today, confused, lethargic, tolerating some po intake , afebrile  11/8 - feels better, denies cp, dyspnea, abdominal pain, afebrile, less confused , tolerating po intake  11/9 - afebrile, denies cp, dyspnea, abdominal pain, feels better, tolerating po intake  11/10 - + generalized weakness, denies cp, abdominal pain, poor historian, events noted - urinary retention, + constipation > 3 days  11/11 - + constipation, urinary retention improved, denies cp, dyspnea, abdominal pain, feels better, plan discussed   11/12 - + bm yesterday pm, + dill , denies cp, dyspnea, abdominal pain, comfortable in the bed  11/13  - + generalized weakness, deneis cp, dyspnea, abdominal pain, afebrile, eager to go home  Review of system- Rest of the review of system are negative except mentioned in HPI  Vital sings reviewed for last 24 h  T(C): 36.7 (11-13-23 @ 08:10), Max: 36.7 (11-12-23 @ 20:46)  T(F): 98.1 (11-13-23 @ 08:10), Max: 98.1 (11-13-23 @ 08:10)  HR: 80 (11-13-23 @ 08:10) (80 - 97)  BP: 124/64 (11-13-23 @ 08:10) (116/61 - 124/64)  RR: 18 (11-13-23 @ 08:10) (18 - 18)  SpO2: 98% (11-13-23 @ 08:10) (93% - 98%)  Physical exam:   General : NAD, appear to be of stated age , well groomed   NERVOUS SYSTEM:  Alert & Oriented X1, limited exam due to mental status   HEAD:  Atraumatic, Normocephalic  EYES: EOMI, PERRLA, conjunctiva and sclera clear  HEENT: Moist mucous membranes, Supple neck , No JVD  CHEST: Clear to auscultation bilaterally; No rales, no rhonchi, no wheezing  HEART: Regular rate and rhythm; No murmurs, no rubs or gallops  ABDOMEN: Soft, Non-tender, Non-distended; Bowel sounds present, no guarding , no peritoneal irritation   GENITOURINARY- Voiding, no suprapubic tenderness  EXTREMITIES:  2+ Peripheral Pulses, No clubbing, cyanosis,   edema  MUSCULOSKELETAL:- No muscle tenderness, Muscle tone normal, No joint tenderness, no Joint swelling,  Joint ROM –normal   SKIN-no rash, no lesion   Labs radiologic and other test : all reviewed and interpreted :         Acute Hypoxic Respiratory Failure, multifactorial CAP and acute diastolic HF and valvular heart disease severe AS, MS  Sepsis, POA due to CAP  Elevated troponin likely due to  NSTEMI  -  admit to tele, hospitalist service, and inpatient  -  serial cardiac enzymes- - TroponinI hsT: 30437<-- 07826<-99255.71, <-44337.66, <-48791.44, <-08341.82, <-93.99  - c/w DAPT , s/p 48h of full dose of lovenox --> prophylactic dose of lovenox  -  cardio consult - Dr. Bautista  -  unable to diurese at this time given the hypotension  - c/w Rocephin 7 days , s/p  IV Zithromax--> doxy  5 days due to prolonged QT  -  follow up blood cultures, CRP 99 --> 24 trending down  -  OOB to chair,   Aspiration and Fall precautions  -  Strict Is/Os,   check daily weights 76--> 72 kg   - 6/2021 -2 d echo - EF 60 %, severe AS, moderate MS  - repeat echo - EF 30%, severe AS, severe pHTN    -  Nasal cannula O2  - CXR 11/10 - pending   11/10 - Urinary retention s/p Dill   - send UA, urine cultures - neg   - c/w IV abx  - c/w bethanechol  - Dill care     Constipation, resolved   - dulcolax, miralax, monitor bm  - 11/10 - lactulose 10  - 11/11 s/p enema    Type 2 Diabetes Mellitus with A1C 7.4  -  convert Levemir to Lantus 10 hs    check sugars qAC/HS, Admelog SSI    New left breast 3.5cm mass in patient with Hx of breast cancer, concern for recurrence    Acute Metabolic Encephalopathy due to dementia, hypoxia and sepsis  - check ammonia, TSH, B12, folate wnl , CRP 99  - lower dose of seroquel 50--> 25 with prn low doses as needed due to prolonged      Prolonged --> 496, improving  - lower dose of seroquel 50--> 25--> 12.5   - stop azithromycin   - serial ekgs     Vitamin D deficiency - replace    Hypoalbuminemia - add supplements    Mild Hyponatremia - persist , improving      DVT prophylaxis - venodynes and low dose  Lovenox s/p  full dose completed 72 h      Chief complain : shortness of breath and hypoxia     HPI:      78 F with Hx of Dementia, DM2, h/o CVA, severe AS, carotid artery stenosis s/p stent,  chronic LBBB, h/o remote  breast cancer  has been bedbound for about 2 years since getting COVID in 2021.  She is at baseline knows her name and is oriented to person only.  Hx obtained from speaking to the patient's daughter as patient is unable to provide any significant history.  She lives at home with her daughter and she has an aide.  She has not left the house in 2 years.  She was brought to the ED because of shortness of breath, cough and she had one episode of vomiting.  No reported abdominal pain.  No reported fevers, chills, shakes, diarrhea or dysuria.      In the ED, she was noted to be hypoxic and had a fever to 102 and was tachycardic.  Imaging revealed a concern for multifocal PNA.  She was treated with IV Rocephin, IV Zithromax.  She did become hypotensive in the ED with a SBP in the 80s, and was treated with a total of 2.5L of NS.  BP has improved.  Lactate was normal.  Initial trop was 94 and BNP was 7472.  11/7 -    Patient seen and examined at bedside earlier today, confused, lethargic, tolerating some po intake , afebrile  11/8 - feels better, denies cp, dyspnea, abdominal pain, afebrile, less confused , tolerating po intake  11/9 - afebrile, denies cp, dyspnea, abdominal pain, feels better, tolerating po intake  11/10 - + generalized weakness, denies cp, abdominal pain, poor historian, events noted - urinary retention, + constipation > 3 days  11/11 - + constipation, urinary retention improved, denies cp, dyspnea, abdominal pain, feels better, plan discussed   11/12 - + bm yesterday pm, + dill , denies cp, dyspnea, abdominal pain, comfortable in the bed  11/13  - + generalized weakness, deneis cp, dyspnea, abdominal pain, afebrile, eager to go home  Review of system- Rest of the review of system are negative except mentioned in HPI  Vital sings reviewed for last 24 h  T(C): 36.7 (11-13-23 @ 08:10), Max: 36.7 (11-12-23 @ 20:46)  T(F): 98.1 (11-13-23 @ 08:10), Max: 98.1 (11-13-23 @ 08:10)  HR: 80 (11-13-23 @ 08:10) (80 - 97)  BP: 124/64 (11-13-23 @ 08:10) (116/61 - 124/64)  RR: 18 (11-13-23 @ 08:10) (18 - 18)  SpO2: 98% (11-13-23 @ 08:10) (93% - 98%)  Physical exam:   General : NAD, appear to be of stated age , well groomed   NERVOUS SYSTEM:  Alert & Oriented X1, limited exam due to mental status   HEAD:  Atraumatic, Normocephalic  EYES: EOMI, PERRLA, conjunctiva and sclera clear  HEENT: Moist mucous membranes, Supple neck , No JVD  CHEST: Clear to auscultation bilaterally; No rales, no rhonchi, no wheezing  HEART: Regular rate and rhythm; No murmurs, no rubs or gallops  ABDOMEN: Soft, Non-tender, Non-distended; Bowel sounds present, no guarding , no peritoneal irritation   GENITOURINARY- Voiding, no suprapubic tenderness  EXTREMITIES:  2+ Peripheral Pulses, No clubbing, cyanosis,   edema  MUSCULOSKELETAL:- No muscle tenderness, Muscle tone normal, No joint tenderness, no Joint swelling,  Joint ROM –normal   SKIN-no rash, no lesion   Labs radiologic and other test : all reviewed and interpreted :         Acute Hypoxic Respiratory Failure, multifactorial CAP and acute diastolic HF   Valvular heart disease severe AS, MS  Sepsis, POA due to CAP, Elevated troponin likely due to  NSTEMI  -  admit to tele, hospitalist service, and inpatient  -  serial cardiac enzymes- - TroponinI hsT: 34421<-- 99594<-99230.71, <-56739.66, <-29082.44, <-30525.82, <-93.99  - c/w DAPT , s/p 48h of full dose of lovenox --> prophylactic dose of lovenox  -  cardio consult - Dr. Bautista  -  unable to diurese at this time given the hypotension  -  s/p  Rocephin 7 days , s/p  IV Zithromax--> doxy  5 days due to prolonged QT  -  follow up blood cultures, CRP 99 --> 24 trending down  -  OOB to chair,   Aspiration and Fall precautions  -  Strict Is/Os,   check daily weights 76--> 72 kg --> 73 kg   - 6/2021 -2 d echo - EF 60 %, severe AS, moderate MS  - repeat echo - EF 30%, severe AS, severe pHTN    - CXR 11/10 - improved CHf  11/10 - Urinary retention s/p Dill   - send UA, urine cultures - neg , s/p  IV abx,  c/w bethanechol  - Dill care at home  Constipation, resolved   - dulcolax, miralax, monitor bm,   11/10 - s/p  lactulose 10,  11/11 s/p enema  Type 2 Diabetes Mellitus with A1C 7.4  -  convert Levemir to Lantus 10 hs    check sugars qAC/HS, Admelog SSI  New left breast 3.5cm mass in patient with Hx of breast cancer, concern for recurrence  Acute Metabolic Encephalopathy due to dementia, hypoxia and sepsis  - check ammonia, TSH, B12, folate wnl , CRP 99  - lower dose of seroquel 50--> 25 with prn low doses as needed due to prolonged    Prolonged --> 500  - lower dose of seroquel 50--> 25--> 12.5   - stop azithromycin ,  serial ekgs     Vitamin D deficiency - replace  Hypoalbuminemia - add supplements  Mild Hyponatremia - persist , improving   Advance directives - comfort care at home as per hospice    DVT prophylaxis - venodynes and low dose  Lovenox s/p  full dose completed 72 h   Final diagnosis, treatment plan, and follow-up recommendations were discussed and explained to the patient.   The patient was given an opportunity to ask questions concerning the diagnosis and treatment plan.   The patient acknowledged understanding of the diagnosis, treatment, and follow-up recommendations.   The patient was advised to seek urgent care upon discharge if worsening symptoms develop prior to scheduled follow-up.   Time spent on discharge included time with the patient, and also coordinating discharge care as outlined below.  Discharge note faxed to PCP with my contact information to call me back   PCP Dr. Garcia  Total time spent: 50 min

## 2023-11-13 NOTE — DISCHARGE NOTE PROVIDER - NSDCMRMEDTOKEN_GEN_ALL_CORE_FT
acetaminophen 650 mg oral tablet, extended release: 1 tab(s) orally every 6 hours  Levemir 100 units/mL subcutaneous solution: 10 unit(s) subcutaneous once a day (at bedtime)   LORazepam 0.5 mg oral tablet: 1 tab(s) orally every 6 hours MDD: 2  morphine 10 mg/5 mL oral solution: 2.5 milliliter(s) orally every 6 hours as needed for -for shortness of breath and/or wheezing - for severe pain MDD: 10 ml/20 mg  Narcan 4 mg/0.1 mL nasal spray: 4 milligram(s) intranasally once as needed for  opioid overdose if no respionse you may repeat dose in 2-3 minutes in alternate nostrils with each dose  prochlorperazine 10 mg oral tablet: 1 tab(s) orally 3 times a day as needed for  nausea  Seroquel 50 mg oral tablet: 1 tab(s) orally once a day (at bedtime)   acetaminophen 650 mg oral tablet, extended release: 1 tab(s) orally every 6 hours  aspirin 81 mg oral delayed release tablet: 1 tab(s) orally once a day  atorvastatin 40 mg oral tablet: 1 tab(s) orally once a day (at bedtime)  bethanechol 10 mg oral tablet: 1 tab(s) orally every 8 hours as needed for  urinary retension  bisacodyl 5 mg oral delayed release tablet: 1 tab(s) orally every 12 hours as needed for  constipation  cholecalciferol 50 mcg (2000 intl units) oral tablet: 1 tab(s) orally once a day (at bedtime)  clopidogrel 75 mg oral tablet: 1 tab(s) orally once a day  Levemir 100 units/mL subcutaneous solution: 10 unit(s) subcutaneous once a day (at bedtime)   LORazepam 0.5 mg oral tablet: 1 tab(s) orally every 6 hours MDD: 2  magnesium hydroxide 8% oral suspension: 30 milliliter(s) orally once a day as needed for  constipation  magnesium oxide 400 mg oral tablet: 1 tab(s) orally once a day  morphine 10 mg/5 mL oral solution: 2.5 milliliter(s) orally every 6 hours as needed for -for shortness of breath and/or wheezing - for severe pain MDD: 10 ml/20 mg  Narcan 4 mg/0.1 mL nasal spray: 4 milligram(s) intranasally once as needed for  opioid overdose if no respionse you may repeat dose in 2-3 minutes in alternate nostrils with each dose  polyethylene glycol 3350 oral powder for reconstitution: 17 gram(s) orally once a day  prochlorperazine 10 mg oral tablet: 1 tab(s) orally 3 times a day as needed for  nausea  Seroquel 25 mg oral tablet: 0.5 tab(s) orally once a day (at bedtime)  thiamine 100 mg oral tablet: 1 tab(s) orally once a day

## 2023-11-13 NOTE — DISCHARGE NOTE PROVIDER - CARE PROVIDER_API CALL
ALDA COTTRELL  [address] [place] [address]  Phone: [phone]  Fax: [phone]  Follow Up Time: 1 week    Norm Bautista)  Cardiology  [address] [place] [address], [address] [place] [id number]  Phone: [phone]  Fax: [phone]  Follow Up Time: 1 week

## 2023-11-13 NOTE — DISCHARGE NOTE PROVIDER - DETAILS OF MALNUTRITION DIAGNOSIS/DIAGNOSES
This patient has been assessed with a concern for Malnutrition and was treated during this hospitalization for the following Nutrition diagnosis/diagnoses:     -  11/07/2023: Severe protein-calorie malnutrition

## 2023-11-13 NOTE — DISCHARGE NOTE PROVIDER - NSDCCPCAREPLAN_GEN_ALL_CORE_FT
PRINCIPAL DISCHARGE DIAGNOSIS  Diagnosis: Multifocal pneumonia  Assessment and Plan of Treatment: you complete course of antibiotics  oxygen for comfort      SECONDARY DISCHARGE DIAGNOSES  Diagnosis: Congestive heart failure  Assessment and Plan of Treatment: with severe valvular disease - aortic stenosis  comfort care as per hospice care at home    Diagnosis: Dementia  Assessment and Plan of Treatment: use lower dose of seroquel 12.5 at bedtime    Diagnosis: NSTEMI (non-ST elevation myocardial infarction)  Assessment and Plan of Treatment: continue ASA, plavix , atorvastatin   follow up with cardiologist if needed Dr. Bautista    Diagnosis: Urinary retention  Assessment and Plan of Treatment: Dill care, continue with bethanechol , change dill once a month    Diagnosis: Constipation  Assessment and Plan of Treatment: take laxatives  as prescribed, monitor BM

## 2023-11-17 DIAGNOSIS — K59.00 CONSTIPATION, UNSPECIFIED: ICD-10-CM

## 2023-11-17 DIAGNOSIS — Z86.73 PERSONAL HISTORY OF TRANSIENT ISCHEMIC ATTACK (TIA), AND CEREBRAL INFARCTION WITHOUT RESIDUAL DEFICITS: ICD-10-CM

## 2023-11-17 DIAGNOSIS — Z86.16 PERSONAL HISTORY OF COVID-19: ICD-10-CM

## 2023-11-17 DIAGNOSIS — J18.9 PNEUMONIA, UNSPECIFIED ORGANISM: ICD-10-CM

## 2023-11-17 DIAGNOSIS — E87.1 HYPO-OSMOLALITY AND HYPONATREMIA: ICD-10-CM

## 2023-11-17 DIAGNOSIS — E88.09 OTHER DISORDERS OF PLASMA-PROTEIN METABOLISM, NOT ELSEWHERE CLASSIFIED: ICD-10-CM

## 2023-11-17 DIAGNOSIS — Z95.828 PRESENCE OF OTHER VASCULAR IMPLANTS AND GRAFTS: ICD-10-CM

## 2023-11-17 DIAGNOSIS — I35.0 NONRHEUMATIC AORTIC (VALVE) STENOSIS: ICD-10-CM

## 2023-11-17 DIAGNOSIS — G93.41 METABOLIC ENCEPHALOPATHY: ICD-10-CM

## 2023-11-17 DIAGNOSIS — E11.9 TYPE 2 DIABETES MELLITUS WITHOUT COMPLICATIONS: ICD-10-CM

## 2023-11-17 DIAGNOSIS — Z51.5 ENCOUNTER FOR PALLIATIVE CARE: ICD-10-CM

## 2023-11-17 DIAGNOSIS — I50.33 ACUTE ON CHRONIC DIASTOLIC (CONGESTIVE) HEART FAILURE: ICD-10-CM

## 2023-11-17 DIAGNOSIS — Z79.82 LONG TERM (CURRENT) USE OF ASPIRIN: ICD-10-CM

## 2023-11-17 DIAGNOSIS — I21.4 NON-ST ELEVATION (NSTEMI) MYOCARDIAL INFARCTION: ICD-10-CM

## 2023-11-17 DIAGNOSIS — E55.9 VITAMIN D DEFICIENCY, UNSPECIFIED: ICD-10-CM

## 2023-11-17 DIAGNOSIS — Z74.01 BED CONFINEMENT STATUS: ICD-10-CM

## 2023-11-17 DIAGNOSIS — J96.01 ACUTE RESPIRATORY FAILURE WITH HYPOXIA: ICD-10-CM

## 2023-11-17 DIAGNOSIS — I44.7 LEFT BUNDLE-BRANCH BLOCK, UNSPECIFIED: ICD-10-CM

## 2023-11-17 DIAGNOSIS — I95.9 HYPOTENSION, UNSPECIFIED: ICD-10-CM

## 2023-11-17 DIAGNOSIS — A41.9 SEPSIS, UNSPECIFIED ORGANISM: ICD-10-CM

## 2023-11-17 DIAGNOSIS — E43 UNSPECIFIED SEVERE PROTEIN-CALORIE MALNUTRITION: ICD-10-CM

## 2023-11-17 DIAGNOSIS — F03.90 UNSPECIFIED DEMENTIA WITHOUT BEHAVIORAL DISTURBANCE: ICD-10-CM

## 2023-11-17 DIAGNOSIS — Z66 DO NOT RESUSCITATE: ICD-10-CM

## 2023-11-17 DIAGNOSIS — N63.20 UNSPECIFIED LUMP IN THE LEFT BREAST, UNSPECIFIED QUADRANT: ICD-10-CM

## 2023-11-17 DIAGNOSIS — N39.0 URINARY TRACT INFECTION, SITE NOT SPECIFIED: ICD-10-CM

## 2023-11-17 DIAGNOSIS — Z85.3 PERSONAL HISTORY OF MALIGNANT NEOPLASM OF BREAST: ICD-10-CM

## 2023-11-17 DIAGNOSIS — Z79.4 LONG TERM (CURRENT) USE OF INSULIN: ICD-10-CM

## 2023-11-17 DIAGNOSIS — Z80.9 FAMILY HISTORY OF MALIGNANT NEOPLASM, UNSPECIFIED: ICD-10-CM

## 2023-11-20 NOTE — ED POST DISCHARGE NOTE - DETAILS
Patient made aware of findings. Keflex sent to pharmacy. - MADISON Dumont decrease endurance/impaired postural control/decreased sensation/impaired sensory feedback/decreased strength

## 2024-07-16 NOTE — CONSULT NOTE ADULT - CONSULT REQUESTED DATE/TIME
Refill Routing Note   Medication(s) are not appropriate for processing by [facility] for the following reason(s):        Drug-disease interaction  Drug-Disease: metFORMIN and Type 2 diabetes mellitus with chronic kidney disease, without long-term current use of insulin, unspecified CKD stage    [facility] action(s):  Defer  Approve             Pharmacist review requested: Yes     Appointments  past 12m or future 3m with PCP    Date Provider   Last Visit   8/24/2023 Lydia Eugene MD   Next Visit   9/19/2024 Lydia Eugene MD   ED visits in past 90 days: 0        Note composed:10:47 AM 07/16/2024           
06-Nov-2023 12:17
06-Nov-2023 17:39

## 2024-10-14 NOTE — ED PROVIDER NOTE - CARDIAC, MLM
[de-identified] : 58 y/o female with cervical degenerative disc disease  Patient has discomfort through the periscapular/cervical region. There is intermittent radiation of symptoms to the upper extremity consistent with a neurologic or component. Clinically, she has good range of motion and strength to the shoulder joint, and despite prior diagnosis of adhesive capsulitis clinical review of the patient's left shoulder today suggests no significant internal derangement.  Review of the patient's radiographs suggest that the left shoulder is benign, but she does have diffuse degenerative disc disease within the cervical spine.  This was discussed in detail, and discussed evaluation and further treatment/management with orthopedic spine evaluation.  Recommendations: Activity to tolerance.  Heat/ice/NSAIDs as needed.  Follow-up Ortho Spine
Normal rate, regular rhythm.  Heart sounds S1, S2.  No murmurs, rubs or gallops.

## 2024-10-19 NOTE — ED ADULT NURSE NOTE - SEPSIS REFERENCE DATA FOR CRITERIA 1 WBC
Patient states she is 37 weeks gestation and c/o constant severe abdominal cramping x 3 (three) hours. Patient rates abdominal pain 8/10.     Care Advice given per Pregnancy - Abdominal Pain Greater Than 20 Weeks EGA - Adult Guideline. Patient advised to Call EMS/911 for immediate medical attention. Patient also advised to contact the [facility] for any worsening symptoms. Patient states understanding of care advice.     Reason for Disposition   [1] SEVERE abdominal pain (e.g., excruciating) AND [2] constant AND [3] present > 1 hour    Additional Information   Negative: Passed out (e.g., fainted, lost consciousness, blacked out and was not responding)   Negative: Shock suspected (e.g., cold/pale/clammy skin, too weak to stand, low BP, rapid pulse)   Negative: Difficult to awaken or acting confused (e.g., disoriented, slurred speech)    Protocols used: Pregnancy - Abdominal Pain Greater Than 20 Weeks EGA-A-    
Abnormal WBC: < 4,000 OR > 12,000

## 2024-12-12 NOTE — ED PROVIDER NOTE - OBJECTIVE STATEMENT
Bone Marrow Aspiration and Biopsy    Bone marrow is the soft, spongy part inside bones. It makes most of the body’s blood cells. Aspiration and biopsy are procedures done to take a sample of bone marrow out of the body for examination. To perform either procedure, a needle is inserted into one of your bones, usually the back of the hip bone. Then a sample of bone marrow is removed.   If a sample of fluid and cells is taken, it is called bone marrow aspiration. If a solid sample of bone marrow tissue is removed, it is called bone marrow biopsy. In either case, the samples are sent to a lab and studied. The procedures can be done alone, but are most often done together.     Recovering at home  Once at home, follow any instructions you’re given. Be sure to:  Take all medicines as directed, you may resume your blood thinners as prescribed (no need to hold).  You may remove your dressing in 24 hours. You may reapply a band-aid to the puncture site if you would like too.  You may shower after the dressing is removed. No submerging in water (tubs, hot tub, swimming) until your puncture site is completely healed.   Check for signs of infection at the procedure site, such as: increased redness, swelling, warmth, bleeding, foul smelling drainage or worsening pain.   Please avoid heavy lifting and other strenuous activities for the next 48 hours. You may resume normal activities as tolerated.   If you were given sedation medications - please no driving or operating heavy machinery, signing legal documents, or drinking alcohol for the next 24 hours.     Follow-up  The provider who ordered your test will discuss the biopsy results with you during a follow-up visit. Results are usually ready within 1 to 2 weeks. If more tests or treatments are needed, your provider will discuss these with you.       Risks and possible complications of these procedures  These include:  Severe bleeding or bruising at the procedure site  Infection  at the procedure site  Bone fracture  Bone infection    Call the healthcare provider  Call your healthcare provider [phone] if you have any of the following:  Fever of 100.4 ºF (38 ºC) or higher, or as directed by your healthcare provider  Signs of infection at the procedure site, such as increased redness or swelling, warmth, worsening pain, bleeding, or foul-smelling drainage       76 y/o female with pmhx of DM2, breast cancer, stroke presents to the ED s/p fall. Pt is a poor historian so hx is unobtainable. Son is in the room and states that she was in the kitchen and had a syncopal episode +hit head +back pain +R shoulder pain +R hip pain -headache -dizziness -chest pain

## 2025-03-12 NOTE — ED ADULT NURSE NOTE - [id number]
Patient Seen in: [facility] Emergency Department      History     Chief Complaint   Patient presents with    Numbness Weakness     Stated Complaint: numbness and tingling during procedure    Subjective:   HPI      Patient is a pleasant 52-year-old woman who presents after outpatient spine surgery with a C4-C5 C5-C6 cervical disc replacement.  After waking from surgery, she had tingling and weakness in arms and legs.  Difficulty standing.  I discussed the case with patient's spine surgeon who sent her to the ER for imaging.  Patient complains of postoperative pain.  She thinks that her weakness is improving though does still has tingling sensations.  There is thirsty.  No chest or abdominal pain.   is at bedside.  No other specific complaints.    Objective:     Past Medical History:    Hospitalism    none              History reviewed. No pertinent surgical history.             Social History     Socioeconomic History    Marital status:     Number of children: 2   Occupational History    Occupation: Sales   Tobacco Use    Smoking status: Never    Smokeless tobacco: Never   Vaping Use    Vaping status: Never Used   Substance and Sexual Activity    Alcohol use: Yes     Alcohol/week: 0.0 standard drinks of alcohol     Comment: socially    Drug use: No    Sexual activity: Yes     Partners: Male                  Physical Exam     ED Triage Vitals   BP 03/12/25 1145 (!) 109/95   Pulse 03/12/25 1145 90   Resp 03/12/25 1145 13   Temp 03/12/25 1228 97.8 °F (36.6 °C)   Temp src 03/12/25 1228 Oral   SpO2 03/12/25 1145 100 %   O2 Device 03/12/25 1145 None (Room air)       Current Vitals:   Vital Signs  BP: 125/90  Pulse: 99  Resp: (!) 28  Temp: 97.8 °F (36.6 °C)  Temp src: Oral  MAP (mmHg): 100    Oxygen Therapy  SpO2: 100 %  O2 Device: None (Room air)        Physical Exam  General: Well-appearing patient of stated age resting comfortably  HEENT: Normocephalic atraumatic.  Nonicteric sclera.  Moist mucous  membranes.  Anterior cervical scar without swelling.  Lungs: No tachypnea  Cardiac: No tachycardia  Skin: No rashes, pallor  Neuro: No focal deficits.  Moves all extremities.  Mild diffuse decreased strength.  Paresthesias.  Extremities: No abnormal swelling or cyanosis.    ED Course     Labs Reviewed   COMP METABOLIC PANEL (14) - Abnormal; Notable for the following components:       Result Value    Glucose 143 (*)     CO2 20.0 (*)     BUN 8 (*)     Calculated Osmolality 297 (*)     Bilirubin, Total 0.2 (*)     All other components within normal limits   CBC WITH DIFFERENTIAL WITH PLATELET - Abnormal; Notable for the following components:    Neutrophil Absolute Prelim 8.50 (*)     Neutrophil Absolute 8.50 (*)     Lymphocyte Absolute 0.91 (*)     All other components within normal limits   SCAN SLIDE   RAINBOW DRAW LAVENDER   RAINBOW DRAW LIGHT GREEN   RAINBOW DRAW BLUE            CT cervical spine: Postsurgical changes of microdiscectomy with disc prosthesis at C4-C5 C5-C6.  No acute cervical spine fractures.  No significant osseous encroachment of the central canal.  Disc osteophyte complexes at C3-C4 C4-C5 C5-C6 C6-C7.  MRI cervical spine: Postsurgical changes.  Report reviewed and discussed with spine surgery.     MDM      Patient presents with weakness after cervical spine surgery.  Appears to be improving.  By report difficulty walking and standing.  Case discussed with Dr. York of spine and imaging discussed with Dr. York spine as well.  At this point will admit for further care and treatment.  Discussed with duly hospitalist.  Discussed with spine.    Admission disposition: 3/12/2025  4:10 PM           Medical Decision Making      Disposition and Plan     Clinical Impression:  1. Hx of cervical spine surgery    2. Weakness         Disposition:  Admit  3/12/2025  4:10 pm    Follow-up:  No follow-up provider specified.        Medications Prescribed:  There are no discharge medications for this  patient.          Supplementary Documentation:         Hospital Problems       Present on Admission  Date Reviewed: 3/9/2022            ICD-10-CM Noted POA    Weakness R53.1 3/12/2025 Unknown                                                              No